# Patient Record
Sex: FEMALE | Race: WHITE | Employment: UNEMPLOYED | RURAL
[De-identification: names, ages, dates, MRNs, and addresses within clinical notes are randomized per-mention and may not be internally consistent; named-entity substitution may affect disease eponyms.]

---

## 2018-10-29 ENCOUNTER — OFFICE VISIT (OUTPATIENT)
Dept: FAMILY MEDICINE CLINIC | Age: 58
End: 2018-10-29

## 2018-10-29 VITALS
SYSTOLIC BLOOD PRESSURE: 140 MMHG | HEIGHT: 63 IN | HEART RATE: 77 BPM | OXYGEN SATURATION: 98 % | DIASTOLIC BLOOD PRESSURE: 82 MMHG | BODY MASS INDEX: 30.83 KG/M2 | RESPIRATION RATE: 18 BRPM | TEMPERATURE: 97.7 F | WEIGHT: 174 LBS

## 2018-10-29 DIAGNOSIS — Z11.59 ENCOUNTER FOR HEPATITIS C SCREENING TEST FOR LOW RISK PATIENT: ICD-10-CM

## 2018-10-29 DIAGNOSIS — E11.9 TYPE 2 DIABETES MELLITUS WITHOUT COMPLICATION, WITHOUT LONG-TERM CURRENT USE OF INSULIN (HCC): ICD-10-CM

## 2018-10-29 DIAGNOSIS — Z23 ENCOUNTER FOR IMMUNIZATION: ICD-10-CM

## 2018-10-29 DIAGNOSIS — E03.9 ACQUIRED HYPOTHYROIDISM: Primary | ICD-10-CM

## 2018-10-29 DIAGNOSIS — Z12.11 SCREENING FOR COLON CANCER: ICD-10-CM

## 2018-10-29 RX ORDER — LEVOTHYROXINE SODIUM 100 UG/1
100 TABLET ORAL
COMMUNITY
End: 2018-12-04 | Stop reason: SDUPTHER

## 2018-10-29 RX ORDER — PROPRANOLOL HYDROCHLORIDE 40 MG/1
40 TABLET ORAL
COMMUNITY
End: 2020-01-09 | Stop reason: SDUPTHER

## 2018-10-29 RX ORDER — GLUCOSAMINE SULFATE 1500 MG
1000 POWDER IN PACKET (EA) ORAL DAILY
COMMUNITY

## 2018-10-29 RX ORDER — LANOLIN ALCOHOL/MO/W.PET/CERES
1 CREAM (GRAM) TOPICAL 2 TIMES DAILY
COMMUNITY

## 2018-10-29 RX ORDER — HYDROCHLOROTHIAZIDE 25 MG/1
12.5 TABLET ORAL DAILY
COMMUNITY
End: 2018-11-01

## 2018-10-29 RX ORDER — ESCITALOPRAM OXALATE 20 MG/1
20 TABLET ORAL DAILY
COMMUNITY
End: 2019-03-11 | Stop reason: DRUGHIGH

## 2018-10-29 RX ORDER — PERPHENAZINE/AMITRIPTYLINE HCL 4MG-10MG
3 TABLET ORAL DAILY
COMMUNITY
End: 2019-06-14

## 2018-10-29 RX ORDER — MULTIVITAMIN
1000 TABLET ORAL DAILY
COMMUNITY
End: 2019-06-14

## 2018-10-29 RX ORDER — LEVOTHYROXINE SODIUM 125 UG/1
125 TABLET ORAL
COMMUNITY
End: 2018-10-29

## 2018-10-29 NOTE — PATIENT INSTRUCTIONS
A Healthy Lifestyle: Care Instructions  Your Care Instructions    A healthy lifestyle can help you feel good, stay at a healthy weight, and have plenty of energy for both work and play. A healthy lifestyle is something you can share with your whole family. A healthy lifestyle also can lower your risk for serious health problems, such as high blood pressure, heart disease, and diabetes. You can follow a few steps listed below to improve your health and the health of your family. Follow-up care is a key part of your treatment and safety. Be sure to make and go to all appointments, and call your doctor if you are having problems. It's also a good idea to know your test results and keep a list of the medicines you take. How can you care for yourself at home? · Do not eat too much sugar, fat, or fast foods. You can still have dessert and treats now and then. The goal is moderation. · Start small to improve your eating habits. Pay attention to portion sizes, drink less juice and soda pop, and eat more fruits and vegetables. ? Eat a healthy amount of food. A 3-ounce serving of meat, for example, is about the size of a deck of cards. Fill the rest of your plate with vegetables and whole grains. ? Limit the amount of soda and sports drinks you have every day. Drink more water when you are thirsty. ? Eat at least 5 servings of fruits and vegetables every day. It may seem like a lot, but it is not hard to reach this goal. A serving or helping is 1 piece of fruit, 1 cup of vegetables, or 2 cups of leafy, raw vegetables. Have an apple or some carrot sticks as an afternoon snack instead of a candy bar. Try to have fruits and/or vegetables at every meal.  · Make exercise part of your daily routine. You may want to start with simple activities, such as walking, bicycling, or slow swimming. Try to be active 30 to 60 minutes every day. You do not need to do all 30 to 60 minutes all at once.  For example, you can exercise 3 times a day for 10 or 20 minutes. Moderate exercise is safe for most people, but it is always a good idea to talk to your doctor before starting an exercise program.  · Keep moving. Jenn Seller the lawn, work in the garden, or Doctor [employer] Work. Take the stairs instead of the elevator at work. · If you smoke, quit. People who smoke have an increased risk for heart attack, stroke, cancer, and other lung illnesses. Quitting is hard, but there are ways to boost your chance of quitting tobacco for good. ? Use nicotine gum, patches, or lozenges. ? Ask your doctor about stop-smoking programs and medicines. ? Keep trying. In addition to reducing your risk of diseases in the future, you will notice some benefits soon after you stop using tobacco. If you have shortness of breath or asthma symptoms, they will likely get better within a few weeks after you quit. · Limit how much alcohol you drink. Moderate amounts of alcohol (up to 2 drinks a day for men, 1 drink a day for women) are okay. But drinking too much can lead to liver problems, high blood pressure, and other health problems. Family health  If you have a family, there are many things you can do together to improve your health. · Eat meals together as a family as often as possible. · Eat healthy foods. This includes fruits, vegetables, lean meats and dairy, and whole grains. · Include your family in your fitness plan. Most people think of activities such as jogging or tennis as the way to fitness, but there are many ways you and your family can be more active. Anything that makes you breathe hard and gets your heart pumping is exercise. Here are some tips:  ? Walk to do errands or to take your child to school or the bus.  ? Go for a family bike ride after dinner instead of watching TV. Where can you learn more? Go to http://erickson-caitlyn.info/. Enter W939 in the search box to learn more about \"A Healthy Lifestyle: Care Instructions. \"  Current as of: December 7, 2017  Content Version: 11.8  © 3332-2051 [employer]. Care instructions adapted under license by [employer] (which disclaims liability or warranty for this information). If you have questions about a medical condition or this instruction, always ask your healthcare professional. [address] any warranty or liability for your use of this information.

## 2018-10-29 NOTE — PROGRESS NOTES
Subjective:      Jennifer Gordon is a 59 y.o. female [facility]. with PMHx of hyperthyroidism, depression, migraine headache, fluid retention of the legs and hands here to establish care. Diabetes mellitus: diagnosed a few years ago. Currently not on medication and has been trying to control with diet. Does not monitor BG at home. No known family h/o diabetes mellitus. No h/o gestational diabetes. Reports that she has been in denial concerning her diagnosis. - Diet: low carbohydrate, high protein     Hypothyroidism: currently on levothyroxine 100 mcg daily which she reports compliance with. Currently endorses hair loss, dry skin, worsening depressive symptoms. Has not had thyroid function checked in some time. Migraine: currently on prophylactic therapy with propranolol and has not had one in some time since being on therapy. Associated with nausea, vomiting, light sensitivity. Health Maintenance:  · Cervical cancer screening: has had, but unknown year  · Mammogram: discussed and declines due to no known family h/o breast cancer  · Colonoscopy: has not had   · Hepatitis C screening (born between [birth year] and 1967): had not had   · Tetanus: had had, unknown when last received   · Pneumovax: has not had  · Shingles vaccine: none   · Influenza vaccine: discussed and declines       Current Outpatient Medications   Medication Sig Dispense Refill    propranolol (INDERAL) 40 mg tablet Take 40 mg by mouth nightly.  escitalopram oxalate (LEXAPRO) 20 mg tablet Take 20 mg by mouth daily.  hydroCHLOROthiazide (HYDRODIURIL) 25 mg tablet Take 12.5 mg by mouth daily.  cholecalciferol (VITAMIN D3) 1,000 unit cap Take 1,000 Units by mouth daily.  cinnamon bark (CINNAMON) 500 mg cap Take 1,000 mg by mouth daily.  glucosamine-chondroitin (ARTHX) 500-400 mg cap Take 1 Cap by mouth two (2) times a day.       B.infantis-B.ani-B.long-B.bifi (PROBIOTIC 4X) 10-15 mg TbEC Take 1 Tab by mouth two (2) times a day.  coconut oil 1,000 mg cap Take 3 Caps by mouth daily.  levothyroxine (SYNTHROID) 100 mcg tablet Take 100 mcg by mouth Daily (before breakfast). No Known Allergies      Past medical history - reviewed. Past Medical History:   Diagnosis Date    Depression     Diabetes ([facility] [address]     Fluid retention     Hypothyroidism     Migraine headache        Social history - reviewed. Social History     Tobacco Use    Smoking status: Never Smoker    Smokeless tobacco: Never Used   Substance Use Topics    Alcohol use: Yes     Frequency: Monthly or less     Drinks per session: 1 or 2        Family history - reviewed.    Family History   Problem Relation Age of Onset    No Known Problems Mother     Heart Failure Father        Review of Systems  Constitutional: negative for fevers and chills  Eyes: negative for visual disturbance and irritation  Ears, nose, mouth, throat, and face: negative for nasal congestion and sore throat  Respiratory: negative for cough, sputum or dyspnea on exertion  Cardiovascular: negative for chest pain, chest pressure/discomfort, palpitations, irregular heart beats, lower extremity edema  Gastrointestinal: negative for nausea, vomiting, change in bowel habits and abdominal pain  Genitourinary:negative for frequency, dysuria and hematuria  Musculoskeletal:negative for myalgias and arthralgias       Objective:     Visit Vitals  /82 (BP 1 Location: Right arm, BP Patient Position: Sitting) Comment: Manual   Pulse 77   Temp 97.7 °F (36.5 °C) (Oral) Comment: .   Resp 18   Ht 5' 3\" (1.6 m)   Wt 174 lb (78.9 kg)   SpO2 98%   BMI 30.82 kg/m²      General appearance - alert, well appearing, and in no distress  Eyes - pupils equal and reactive, extraocular eye movements intact, sclera anicteric  Oropharyngx - mucous membranes moist, pharynx normal without lesions  Neck - supple, no significant adenopathy, thyroid exam: thyroid is normal in size without nodules or tenderness  Chest - clear to auscultation, no wheezes, rales or rhonchi, symmetric air entry, no tachypnea, retractions or cyanosis  Heart - normal rate, regular rhythm, normal S1, S2, no murmurs, rubs, clicks or gallops  Abdomen - soft, nontender, nondistended, no masses or organomegaly, bowel sounds normal  Extremities - peripheral pulses normal, no pedal edema, no clubbing or cyanosis  Neurologic - alert, oriented, normal speech, no focal findings or movement disorder noted  Skin - normal coloration and turgor, no rashes, no suspicious skin lesions noted  Mental Status - alert, oriented to person, place, and time, normal mood, behavior, speech, dress, motor activity, and thought processes    Assessment/Plan:   Charlene Barr is a 59 y.o. female seen for:     1. Acquired hypothyroidism: with symptoms. Will check thyroid function studies as below and adjust medication as needed. - TSH 3RD GENERATION  - T4, FREE    2. Type 2 diabetes mellitus without complication, without long-term current use of insulin ([facility] [address]): per history, currently diet controlled. Check labs as below.   - HEMOGLOBIN A1C WITH EAG  - METABOLIC PANEL, COMPREHENSIVE  - LIPID PANEL    3. Screening for colon cancer: referral to GI provided. - REFERRAL TO GASTROENTEROLOGY    4. Encounter for immunization: Rx for Shingrix provided and encouraged to have administered at her local pharmacy. - varicella-zoster recombinant, PF, (SHINGRIX, PF,) 50 mcg/0.5 mL susr injection; 0.5mL by IntraMUSCular route once now and then repeat in 2-6 months  Dispense: 0.5 mL; Refill: 1    5. Encounter for hepatitis C screening test for low risk patient: screen based upon birth year. - HEPATITIS C AB      I have discussed the diagnosis with the patient and the intended plan as seen in the above orders. The patient has received an after-visit summary and questions were answered concerning future plans.  I have discussed medication side effects and warnings with the patient as well. Patient verbalizes understanding of plan of care and denies further questions or concerns at this time. Informed patient to return to the office if symptoms worsen or if new symptoms arise. Follow-up Disposition:  Return in about 4 months (around 2/28/2019), or sooner as needed.

## 2018-10-29 NOTE — PROGRESS NOTES
Identified pt with two pt identifiers(name and ). Chief Complaint   Patient presents with   [address] Immunization/Injection     would like shingles Vacc.  Labs     would like to have Thyroid checked. Health Maintenance Due   Topic    Hepatitis C Screening     DTaP/Tdap/Td series (1 - Tdap)    PAP AKA CERVICAL CYTOLOGY     Shingrix Vaccine Age 50> (1 of 2)    FOBT Q 1 YEAR AGE 50-75        Wt Readings from Last 3 Encounters:   10/29/18 174 lb (78.9 kg)     Temp Readings from Last 3 Encounters:   10/29/18 97.7 °F (36.5 °C) (Oral)     BP Readings from Last 3 Encounters:   10/29/18 140/82     Pulse Readings from Last 3 Encounters:   10/29/18 77         Learning Assessment:  :     No flowsheet data found. Depression Screening:  :     PHQ over the last two weeks 10/29/2018   PHQ Not Done Active Diagnosis of Depression or Bipolar Disorder       Fall Risk Assessment:  :     No flowsheet data found. Abuse Screening:  :     Abuse Screening Questionnaire 10/29/2018   Do you ever feel afraid of your partner? N   Are you in a relationship with someone who physically or mentally threatens you? N   Is it safe for you to go home? Y       Coordination of Care Questionnaire:  :     1) Have you been to an emergency room, urgent care clinic since your last visit? no   Hospitalized since your last visit? no             2) Have you seen or consulted any other health care providers outside of [facility] since your last visit? yes Dr. Ravinder Santana PCP [facility]. (Include any pap smears or colon screenings in this section.)    3) Do you have an Advance Directive on file? no  Are you interested in receiving information about Advance Directives? no    Reviewed record in preparation for visit and have obtained necessary documentation. Medication reconciliation up to date and corrected with patient at this time.

## 2018-11-01 ENCOUNTER — DOCUMENTATION ONLY (OUTPATIENT)
Dept: FAMILY MEDICINE CLINIC | Age: 58
End: 2018-11-01

## 2018-11-01 RX ORDER — DIAPER,BRIEF,INFANT-TODD,DISP
1 EACH MISCELLANEOUS 2 TIMES DAILY
COMMUNITY

## 2018-11-01 RX ORDER — CALCIUM CARB/VITAMIN D3/VIT K1 500-500-40
1 TABLET,CHEWABLE ORAL 2 TIMES DAILY
COMMUNITY
End: 2019-03-11 | Stop reason: ALTCHOICE

## 2018-11-01 RX ORDER — TRIAMTERENE/HYDROCHLOROTHIAZID 37.5-25 MG
0.5 TABLET ORAL DAILY
COMMUNITY
End: 2019-06-14 | Stop reason: SDUPTHER

## 2018-11-01 RX ORDER — DIAPER,BRIEF,ADULT, DISPOSABLE
1 EACH MISCELLANEOUS DAILY
COMMUNITY

## 2018-11-01 RX ORDER — LANOLIN ALCOHOL/MO/W.PET/CERES
800 CREAM (GRAM) TOPICAL DAILY
COMMUNITY
End: 2020-04-15

## 2018-11-03 LAB
ALBUMIN SERPL-MCNC: 4.4 G/DL (ref 3.6–4.8)
ALBUMIN/GLOB SERPL: 2 {RATIO} (ref 1.2–2.2)
ALP SERPL-CCNC: 110 IU/L (ref 39–117)
ALT SERPL-CCNC: 11 IU/L (ref 0–32)
AST SERPL-CCNC: 15 IU/L (ref 0–40)
BILIRUB SERPL-MCNC: 0.5 MG/DL (ref 0–1.2)
BUN SERPL-MCNC: 13 MG/DL (ref 8–27)
BUN/CREAT SERPL: 15 (ref 12–28)
CALCIUM SERPL-MCNC: 9.6 MG/DL (ref 8.7–10.3)
CHLORIDE SERPL-SCNC: 94 MMOL/L (ref 96–106)
CHOLEST SERPL-MCNC: 228 MG/DL (ref 100–199)
CO2 SERPL-SCNC: 27 MMOL/L (ref 20–29)
CREAT SERPL-MCNC: 0.87 MG/DL (ref 0.57–1)
EST. AVERAGE GLUCOSE BLD GHB EST-MCNC: 358 MG/DL
GLOBULIN SER CALC-MCNC: 2.2 G/DL (ref 1.5–4.5)
GLUCOSE SERPL-MCNC: 394 MG/DL (ref 65–99)
HBA1C MFR BLD: 14.1 % (ref 4.8–5.6)
HCV AB S/CO SERPL IA: <0.1 S/CO RATIO (ref 0–0.9)
HDLC SERPL-MCNC: 52 MG/DL
INTERPRETATION, 910389: NORMAL
LDLC SERPL CALC-MCNC: 133 MG/DL (ref 0–99)
Lab: NORMAL
POTASSIUM SERPL-SCNC: 5.1 MMOL/L (ref 3.5–5.2)
PROT SERPL-MCNC: 6.6 G/DL (ref 6–8.5)
SODIUM SERPL-SCNC: 136 MMOL/L (ref 134–144)
T4 FREE SERPL-MCNC: 2.14 NG/DL (ref 0.82–1.77)
TRIGL SERPL-MCNC: 217 MG/DL (ref 0–149)
TSH SERPL DL<=0.005 MIU/L-ACNC: 2.07 UIU/ML (ref 0.45–4.5)
VLDLC SERPL CALC-MCNC: 43 MG/DL (ref 5–40)

## 2018-11-07 ENCOUNTER — TELEPHONE (OUTPATIENT)
Dept: FAMILY MEDICINE CLINIC | Age: 58
End: 2018-11-07

## 2018-12-03 NOTE — TELEPHONE ENCOUNTER
----- Message from Karen Hansen sent at 12/3/2018 11:24 AM EST -----  Regarding: Dr. Paul Perez requesting lab results from [facility] November 1,2018, Pt also requesting a refill on her thyroid medication. [address] on file. Pt best contact number is [phone].

## 2018-12-05 ENCOUNTER — TELEPHONE (OUTPATIENT)
Dept: FAMILY MEDICINE CLINIC | Age: 58
End: 2018-12-05

## 2018-12-05 RX ORDER — LEVOTHYROXINE SODIUM 100 UG/1
100 TABLET ORAL
Qty: 90 TAB | Refills: 1 | Status: SHIPPED | OUTPATIENT
Start: 2018-12-05 | End: 2019-06-14 | Stop reason: SDUPTHER

## 2018-12-11 ENCOUNTER — OFFICE VISIT (OUTPATIENT)
Dept: FAMILY MEDICINE CLINIC | Age: 58
End: 2018-12-11

## 2018-12-11 VITALS
HEIGHT: 63 IN | TEMPERATURE: 98.6 F | SYSTOLIC BLOOD PRESSURE: 115 MMHG | WEIGHT: 170 LBS | RESPIRATION RATE: 18 BRPM | HEART RATE: 60 BPM | BODY MASS INDEX: 30.12 KG/M2 | OXYGEN SATURATION: 98 % | DIASTOLIC BLOOD PRESSURE: 70 MMHG

## 2018-12-11 DIAGNOSIS — E11.9 TYPE 2 DIABETES MELLITUS WITHOUT COMPLICATION, WITHOUT LONG-TERM CURRENT USE OF INSULIN (HCC): Primary | ICD-10-CM

## 2018-12-11 LAB
ALBUMIN UR QL STRIP: 30 MG/L
CREATININE, URINE POC: 200 MG/DL
MICROALBUMIN/CREAT RATIO POC: <30 MG/G

## 2018-12-11 RX ORDER — METFORMIN HYDROCHLORIDE 500 MG/1
TABLET, EXTENDED RELEASE ORAL
Qty: 60 TAB | Refills: 2 | Status: SHIPPED | OUTPATIENT
Start: 2018-12-11 | End: 2019-03-06 | Stop reason: SDUPTHER

## 2018-12-11 RX ORDER — GLIPIZIDE 10 MG/1
10 TABLET, FILM COATED, EXTENDED RELEASE ORAL DAILY
Qty: 30 TAB | Refills: 2 | Status: SHIPPED | OUTPATIENT
Start: 2018-12-11 | End: 2019-03-06 | Stop reason: SDUPTHER

## 2018-12-11 NOTE — PROGRESS NOTES
Identified pt with two pt identifiers(name and ). Chief Complaint   Patient presents with    Labs     go over lab results     Diabetes        Health Maintenance Due   Topic    DTaP/Tdap/Td series (1 - Tdap)    PAP AKA CERVICAL CYTOLOGY     Shingrix Vaccine Age 50> (1 of 2)    FOBT Q 1 YEAR AGE 50-75        Wt Readings from Last 3 Encounters:   18 170 lb (77.1 kg)   10/29/18 174 lb (78.9 kg)     Temp Readings from Last 3 Encounters:   18 98.6 °F (37 °C) (Oral)   10/29/18 97.7 °F (36.5 °C) (Oral)     BP Readings from Last 3 Encounters:   18 115/70   10/29/18 140/82     Pulse Readings from Last 3 Encounters:   18 60   10/29/18 77         Learning Assessment:  :     No flowsheet data found. Depression Screening:  :     PHQ over the last two weeks 10/29/2018   PHQ Not Done Active Diagnosis of Depression or Bipolar Disorder       Fall Risk Assessment:  :     No flowsheet data found. Abuse Screening:  :     Abuse Screening Questionnaire 10/29/2018   Do you ever feel afraid of your partner? N   Are you in a relationship with someone who physically or mentally threatens you? N   Is it safe for you to go home? Y       Coordination of Care Questionnaire:  :     1) Have you been to an emergency room, urgent care clinic since your last visit? no   Hospitalized since your last visit? no             2) Have you seen or consulted any other health care providers outside of [address] [place] [address] since your last visit? no  (Include any pap smears or colon screenings in this section.)    3) Do you have an Advance Directive on file? no  Are you interested in receiving information about Advance Directives? no    Reviewed record in preparation for visit and have obtained necessary documentation. Medication reconciliation up to date and corrected with patient at this time.

## 2018-12-11 NOTE — PROGRESS NOTES
Subjective:      Lydia Rodriguez is a 59 y.o. female here to discuss lab results. Diabetes mellitus: diagnosed a few years ago. Her most recent A1c 14.1%. Currently not on medication and has been trying to control with diet. Does not monitor BG at home. No known family h/o diabetes mellitus. No h/o gestational diabetes. - Diet: low carbohydrate, high protein     Current Outpatient Medications   Medication Sig Dispense Refill    OTHER Tumeric      levothyroxine (SYNTHROID) 100 mcg tablet Take 1 Tab by mouth Daily (before breakfast). 90 Tab 1    prenatal vit/iron fum/folic ac (PRENATAL-FOLIC ACID PO) Take 1 Tab by mouth daily.  chrom abiola/brindal berry (GARCINIA CAMBOGIA PO) Take 2,400 mg by mouth daily.  chromium picolinate 1,000 mcg tablet Take 1 Tab by mouth two (2) times a day.  triamterene-hydroCHLOROthiazide (MAXZIDE) 37.5-25 mg per tablet Take 0.5 Tabs by mouth daily.  ferrous fumarate/vit Bcomp,C (SUPER B COMPLEX PO) Take 1 Tab by mouth daily.  magnesium oxide (MAG-OX) 400 mg tablet Take 800 mg by mouth daily.  biotin 10,000 mcg cap Take 1 Cap by mouth two (2) times a day.  potassium 99 mg tablet Take 2 Tabs by mouth daily.  lysine (L-LYSINE) 500 mg tab tablet Take 1 Tab by mouth daily.  a lipoic acid/folic/mv-mn/lut (DIABETES HEALTH PO) Take  by mouth.  propranolol (INDERAL) 40 mg tablet Take 40 mg by mouth nightly.  escitalopram oxalate (LEXAPRO) 20 mg tablet Take 20 mg by mouth daily.  cholecalciferol (VITAMIN D3) 1,000 unit cap Take 1,000 Units by mouth daily.  cinnamon bark (CINNAMON) 500 mg cap Take 1,000 mg by mouth daily.  glucosamine-chondroitin (ARTHX) 500-400 mg cap Take 1 Cap by mouth two (2) times a day.  B.infantis-B.ani-B.long-B.bifi (PROBIOTIC 4X) 10-15 mg TbEC Take 1 Tab by mouth two (2) times a day.  coconut oil 1,000 mg cap Take 3 Caps by mouth daily.          No Known Allergies    Past Medical History: Diagnosis Date    Depression     Diabetes ([facility] [address]     Fluid retention     Hypothyroidism     Migraine headache        Social History     Tobacco Use    Smoking status: Never Smoker    Smokeless tobacco: Never Used   Substance Use Topics    Alcohol use: Yes     Frequency: Monthly or less     Drinks per session: 1 or 2        Review of Systems  Pertinent items are noted in HPI. Objective:     Visit Vitals  /70 (BP 1 Location: Right arm, BP Patient Position: Sitting) Comment: Manual   Pulse 60   Temp 98.6 °F (37 °C) (Oral) Comment: .   Resp 18   Ht 5' 3\" (1.6 m)   Wt 170 lb (77.1 kg)   SpO2 98%   BMI 30.11 kg/m²      General appearance - alert, well appearing, and in no distress  Chest - clear to auscultation, no wheezes, rales or rhonchi, symmetric air entry, no tachypnea, retractions or cyanosis  Heart - normal rate, regular rhythm, normal S1, S2, no murmurs, rubs, clicks or gallops    Assessment/Plan:   Pavithra Martin is a 59 y.o. female seen for:     1. Type 2 diabetes mellitus without complication, without long-term current use of insulin [facility]): will start therapy as below. Patient does not wish to do insulin therapy. Refer to diabetic education.   - AMB POC URINE, MICROALBUMIN, SEMIQUANT (3 RESULTS)  - metFORMIN ER (GLUCOPHAGE XR) 500 mg tablet; Take 1 tablet by mouth with dinner x 7 days then increase to 2 tablets daily by mouth with dinner. Dispense: 60 Tab; Refill: 2  - glipiZIDE SR (GLUCOTROL XL) 10 mg CR tablet; Take 1 Tab by mouth daily. Dispense: 30 Tab; Refill: 2  - REFERRAL TO DIABETIC EDUCATION    I have discussed the diagnosis with the patient and the intended plan as seen in the above orders. The patient has received an after-visit summary and questions were answered concerning future plans. I have discussed medication side effects and warnings with the patient as well. Patient verbalizes understanding of plan of care and denies further questions or concerns at this time.  Informed patient to return to the office if symptoms worsen or if new symptoms arise. Total face to face time 15 minutes, with > 50% spent counseling or coordination of care regarding diagnosis, risk and benefits of various treatment plans and expected outcomes. Follow-up Disposition:  Return in about 3 months (around 3/11/2019).

## 2018-12-11 NOTE — PATIENT INSTRUCTIONS
Learning About Type 2 Diabetes  What is type 2 diabetes? Insulin is a hormone that helps your body use sugar from your food as energy. Type 2 diabetes happens when your body can't use insulin the right way. Over time, the pancreas can't make enough insulin. If you don't have enough insulin, too much sugar stays in your blood. If you are overweight, get little or no exercise, or have type 2 diabetes in your family, you are more likely to have problems with the way insulin works in your body.  Americans, Hispanics, Native Americans,  Americans, and Pacific Islanders have a higher risk for type 2 diabetes. Type 2 diabetes can be prevented or delayed with a healthy lifestyle, which includes staying at a healthy weight, making smart food choices, and getting regular exercise. What can you expect with type 2 diabetes? Purnima Henderson keep hearing about how important it is to keep your blood sugar within a target range. That's because over time, high blood sugar can lead to serious problems. It can:  · Harm your eyes, nerves, and kidneys. · Damage your blood vessels, leading to heart disease and stroke. · Reduce blood flow and cause nerve damage to parts of your body, especially your feet. This can cause slow healing and pain when you walk. · Make your immune system weak and less able to fight infections. When people hear the word \"diabetes,\" they often think of problems like these. But daily care and treatment can help prevent or delay these problems. The goal is to keep your blood sugar in a target range. That's the best way to reduce your chance of having more problems from diabetes. What are the symptoms? Some people who have type 2 diabetes may not have any symptoms early on. Many people with the disease don't even know they have it at first. But with time, diabetes starts to cause symptoms. You experience most symptoms of type 2 diabetes when your blood sugar is either too high or too low.   The most common symptoms of high blood sugar include:  · Thirst.  · Frequent urination. · Weight loss. · Blurry vision. The symptoms of low blood sugar include:  · Sweating. · Shakiness. · Weakness. · Hunger. · Confusion. How can you prevent type 2 diabetes? The best way to prevent or delay type 2 diabetes is to adopt healthy habits, which include:  · Staying at a healthy weight. · Exercising regularly. · Eating healthy foods. How is type 2 diabetes treated? If you have type 2 diabetes, here are the most important things you can do. · Take your diabetes medicines. · Check your blood sugar as often as your doctor recommends. Also, get a hemoglobin A1c test at least every 6 months. · Try to eat a variety of foods and to spread carbohydrate throughout the day. Carbohydrate raises blood sugar higher and more quickly than any other nutrient does. Carbohydrate is found in sugar, breads and cereals, fruit, starchy vegetables such as potatoes and corn, and milk and yogurt. · Get at least 30 minutes of exercise on most days of the week. Walking is a good choice. You also may want to do other activities, such as running, swimming, cycling, or playing tennis or team sports. If your doctor says it's okay, do muscle-strengthening exercises at least 2 times a week. · See your doctor for checkups and tests on a regular schedule. · If you have high blood pressure or high cholesterol, take the medicines as prescribed by your doctor. · Do not smoke. Smoking can make health problems worse. This includes problems you might have with type 2 diabetes. If you need help quitting, talk to your doctor about stop-smoking programs and medicines. These can increase your chances of quitting for good. Follow-up care is a key part of your treatment and safety. Be sure to make and go to all appointments, and call your doctor if you are having problems.  It's also a good idea to know your test results and keep a list of the medicines you take. Where can you learn more? Go to http://erickson-caitlyn.info/. Enter C169 in the search box to learn more about \"Learning About Type 2 Diabetes. \"  Current as of: December 7, 2017  Content Version: 11.8  © 1200-0985 [employer]. Care instructions adapted under license by [employer] (which disclaims liability or warranty for this information). If you have questions about a medical condition or this instruction, always ask your healthcare professional. [address] any warranty or liability for your use of this information. Learning About High Cholesterol  What is high cholesterol? Cholesterol is a type of fat in your blood. It is needed for many body functions, such as making new cells. Cholesterol is made by your body. It also comes from food you eat. If you have too much cholesterol, it starts to build up in your arteries. This is called hardening of the arteries, or atherosclerosis. High cholesterol raises your risk of a heart attack and stroke. There are different types of cholesterol. LDL is the \"bad\" cholesterol. High LDL can raise your risk for heart disease, heart attack, and stroke. HDL is the \"good\" cholesterol. High HDL is linked with a lower risk for heart disease, heart attack, and stroke. Your cholesterol levels help your doctor find out your risk for having a heart attack or stroke. How can you prevent high cholesterol? A heart-healthy lifestyle can help you prevent high cholesterol. This lifestyle helps lower your risk for a heart attack and stroke. · Eat heart-healthy foods. ? Eat fruits, vegetables, whole grains (like oatmeal), dried beans and peas, nuts and seeds, soy products (like tofu), and fat-free or low-fat dairy products. ? Replace butter, margarine, and hydrogenated or partially hydrogenated oils with olive and canola oils. (Canola oil margarine without trans fat is fine.)  ?  Replace red meat with fish, poultry, and soy protein (like tofu). ? Limit processed and packaged foods like chips, crackers, and cookies. · Be active. Exercise can improve your cholesterol level. Get at least 30 minutes of exercise on most days of the week. Walking is a good choice. You also may want to do other activities, such as running, swimming, cycling, or playing tennis or team sports. · Stay at a healthy weight. Lose weight if you need to. · Don't smoke. If you need help quitting, talk to your doctor about stop-smoking programs and medicines. These can increase your chances of quitting for good. How is high cholesterol treated? The goal of treatment is to reduce your chances of having a heart attack or stroke. The goal is not to lower your cholesterol numbers only. · You may make lifestyle changes, such as eating healthy foods, not smoking, losing weight, and being more active. · You may have to take medicine. Follow-up care is a key part of your treatment and safety. Be sure to make and go to all appointments, and call your doctor if you are having problems. It's also a good idea to know your test results and keep a list of the medicines you take. Where can you learn more? Go to http://erickson-caitlyn.info/. Enter H263 in the search box to learn more about \"Learning About High Cholesterol. \"  Current as of: December 6, 2017  Content Version: 11.8  © 1314-3413 [employer]. Care instructions adapted under license by [employer] (which disclaims liability or warranty for this information). If you have questions about a medical condition or this instruction, always ask your healthcare professional. [address] any warranty or liability for your use of this information.

## 2019-03-06 DIAGNOSIS — E11.9 TYPE 2 DIABETES MELLITUS WITHOUT COMPLICATION, WITHOUT LONG-TERM CURRENT USE OF INSULIN (HCC): ICD-10-CM

## 2019-03-08 RX ORDER — GLIPIZIDE 10 MG/1
TABLET, FILM COATED, EXTENDED RELEASE ORAL
Qty: 30 TAB | Refills: 2 | Status: SHIPPED | OUTPATIENT
Start: 2019-03-08 | End: 2019-06-19 | Stop reason: SDUPTHER

## 2019-03-08 RX ORDER — METFORMIN HYDROCHLORIDE 500 MG/1
TABLET, EXTENDED RELEASE ORAL
Qty: 60 TAB | Refills: 2 | Status: SHIPPED | OUTPATIENT
Start: 2019-03-08 | End: 2019-06-14 | Stop reason: SDUPTHER

## 2019-03-11 ENCOUNTER — OFFICE VISIT (OUTPATIENT)
Dept: FAMILY MEDICINE CLINIC | Age: 59
End: 2019-03-11

## 2019-03-11 VITALS
TEMPERATURE: 98.5 F | WEIGHT: 172 LBS | OXYGEN SATURATION: 98 % | HEART RATE: 85 BPM | BODY MASS INDEX: 30.48 KG/M2 | HEIGHT: 63 IN | SYSTOLIC BLOOD PRESSURE: 136 MMHG | RESPIRATION RATE: 16 BRPM | DIASTOLIC BLOOD PRESSURE: 88 MMHG

## 2019-03-11 DIAGNOSIS — E11.9 TYPE 2 DIABETES MELLITUS WITHOUT COMPLICATION, WITHOUT LONG-TERM CURRENT USE OF INSULIN (HCC): Primary | ICD-10-CM

## 2019-03-11 DIAGNOSIS — F32.A DEPRESSION, UNSPECIFIED DEPRESSION TYPE: ICD-10-CM

## 2019-03-11 DIAGNOSIS — E03.9 ACQUIRED HYPOTHYROIDISM: ICD-10-CM

## 2019-03-11 RX ORDER — ESCITALOPRAM OXALATE 20 MG/1
30 TABLET ORAL DAILY
Qty: 45 TAB | Refills: 2 | Status: SHIPPED | OUTPATIENT
Start: 2019-03-11 | End: 2019-06-14 | Stop reason: SDUPTHER

## 2019-03-11 NOTE — PROGRESS NOTES
Subjective:      Nella Barron is a 62 y.o. female here diabetes follow up. She is fasting for labs. Diabetes mellitus:   · Medication compliance: compliant all of the time  · Diabetic diet compliance: compliant all of the time  · Home glucose monitoring: is performed regularly fasting in the AM, 14-day average , 30-day BG average  (30 days)   · Further diabetic ROS: no polyuria or polydipsia, no chest pain, dyspnea or TIA's, no numbness, tingling or pain in extremities, no unusual visual symptoms. · Eye exam: has not had     Other symptoms and concerns:   - She will be leaving for [place] for the next month to help her daughter. She has noticed over the past month or so and increase in her depression - sad mood, anhedonia. She reports history of PTSD and believes that she has not truly dealt with the underlying trauma she has experienced. She has been on Lexapro for a number of years and has tolerated well. She inquires about medication adjustment. Current Outpatient Medications   Medication Sig Dispense Refill    glipiZIDE SR (GLUCOTROL XL) 10 mg CR tablet TAKE ONE TABLET BY MOUTH DAILY 30 Tab 2    metFORMIN ER (GLUCOPHAGE XR) 500 mg tablet TAKE ONE TABLET BY MOUTH TWICE A DAY 60 Tab 2    VSL#3 112.5 billion cell cap Take 1 Cap by mouth two (2) times a day. 60 Cap 2    OTHER Tumeric      levothyroxine (SYNTHROID) 100 mcg tablet Take 1 Tab by mouth Daily (before breakfast). 90 Tab 1    prenatal vit/iron fum/folic ac (PRENATAL-FOLIC ACID PO) Take 1 Tab by mouth daily.  ferrous fumarate/vit Bcomp,C (SUPER B COMPLEX PO) Take 1 Tab by mouth daily.  magnesium oxide (MAG-OX) 400 mg tablet Take 800 mg by mouth daily.  biotin 10,000 mcg cap Take 1 Cap by mouth two (2) times a day.  potassium 99 mg tablet Take 2 Tabs by mouth daily.  lysine (L-LYSINE) 500 mg tab tablet Take 1 Tab by mouth daily.  propranolol (INDERAL) 40 mg tablet Take 40 mg by mouth nightly.       escitalopram oxalate (LEXAPRO) 20 mg tablet Take 20 mg by mouth daily.  cholecalciferol (VITAMIN D3) 1,000 unit cap Take 1,000 Units by mouth daily.  cinnamon bark (CINNAMON) 500 mg cap Take 1,000 mg by mouth daily.  glucosamine-chondroitin (ARTHX) 500-400 mg cap Take 1 Cap by mouth two (2) times a day.  coconut oil 1,000 mg cap Take 3 Caps by mouth daily.  triamterene-hydroCHLOROthiazide (MAXZIDE) 37.5-25 mg per tablet Take 0.5 Tabs by mouth daily. No Known Allergies      Past Medical History:   Diagnosis Date    Depression     Diabetes ([facility] [address]     Fluid retention     Hypothyroidism     Migraine headache        Social History     Tobacco Use    Smoking status: Never Smoker    Smokeless tobacco: Never Used   Substance Use Topics    Alcohol use: Yes     Frequency: Monthly or less     Drinks per session: 1 or 2        Review of Systems  Pertinent items are noted in HPI.      Objective:     Visit Vitals  /88 (BP 1 Location: Right arm, BP Patient Position: Sitting) Comment: Manual   Pulse 85   Temp 98.5 °F (36.9 °C) (Oral)   Resp 16   Ht 5' 3\" (1.6 m)   Wt 172 lb (78 kg)   SpO2 98%   BMI 30.47 kg/m²      General appearance - alert, well appearing, and in no distress  Eyes - pupils equal and reactive, extraocular eye movements intact, sclera anicteric  Oropharyngx - mucous membranes moist, pharynx normal without lesions  Neck - supple, no significant adenopathy, carotids upstroke normal bilaterally, no bruits  Chest - clear to auscultation, no wheezes, rales or rhonchi, symmetric air entry, no tachypnea, retractions or cyanosis  Heart - normal rate, regular rhythm, normal S1, S2, no murmurs, rubs, clicks or gallops  Neurological - alert, oriented, normal speech, no focal findings or movement disorder noted  Extremities - peripheral pulses normal, no pedal edema, no clubbing or cyanosis  Mental Status: alert, oriented to person, place, and time, normal mood, behavior, speech, dress, motor activity, and thought processes    Diabetic foot exam:   Left Foot:   Visual Exam: normal    Pulse DP: 2+ (normal)   Filament test: normal sensation       Right Foot:   Visual Exam: normal    Pulse DP: 2+ (normal)   Filament test: normal sensation       Assessment/Plan:   Kulwant Soto is a 62 y.o. female seen for:     1. Type 2 diabetes mellitus without complication, without long-term current use of insulin ([address]): home BG have improved. Continue with current therapy. Check labs. - HEMOGLOBIN A1C WITH EAG  - METABOLIC PANEL, COMPREHENSIVE  - LIPID PANEL  -  DIABETES FOOT EXAM  - Encouraged to scheduled eye exam     2. Acquired hypothyroidism: continue with current dose of levothyroxine. Check labs. - TSH AND FREE T4  - T3, FREE    3. Depression, unspecified depression type: will increase dose of Lexapro to 30 mg daily (would not go above this dose). Encouraged to restart outpatient behavioral therapy. - escitalopram oxalate (LEXAPRO) 20 mg tablet; Take 1.5 Tabs by mouth daily. Dispense: 45 Tab; Refill: 2    4. BMI 30.0-30.9,adult: I have reviewed/discussed the above normal BMI with the patient. I have recommended the following interventions: dietary management education, guidance, and counseling and encourage exercise. I have discussed the diagnosis with the patient and the intended plan as seen in the above orders. The patient has received an after-visit summary and questions were answered concerning future plans. I have discussed medication side effects and warnings with the patient as well. Patient verbalizes understanding of plan of care and denies further questions or concerns at this time. Informed patient to return to the office if symptoms worsen or if new symptoms arise. Follow-up Disposition:  Return in about 6 months (around 9/11/2019) for follow up or sooner as needed.

## 2019-03-11 NOTE — PATIENT INSTRUCTIONS
A Healthy Lifestyle: Care Instructions  Your Care Instructions    A healthy lifestyle can help you feel good, stay at a healthy weight, and have plenty of energy for both work and play. A healthy lifestyle is something you can share with your whole family. A healthy lifestyle also can lower your risk for serious health problems, such as high blood pressure, heart disease, and diabetes. You can follow a few steps listed below to improve your health and the health of your family. Follow-up care is a key part of your treatment and safety. Be sure to make and go to all appointments, and call your doctor if you are having problems. It's also a good idea to know your test results and keep a list of the medicines you take. How can you care for yourself at home? · Do not eat too much sugar, fat, or fast foods. You can still have dessert and treats now and then. The goal is moderation. · Start small to improve your eating habits. Pay attention to portion sizes, drink less juice and soda pop, and eat more fruits and vegetables. ? Eat a healthy amount of food. A 3-ounce serving of meat, for example, is about the size of a deck of cards. Fill the rest of your plate with vegetables and whole grains. ? Limit the amount of soda and sports drinks you have every day. Drink more water when you are thirsty. ? Eat at least 5 servings of fruits and vegetables every day. It may seem like a lot, but it is not hard to reach this goal. A serving or helping is 1 piece of fruit, 1 cup of vegetables, or 2 cups of leafy, raw vegetables. Have an apple or some carrot sticks as an afternoon snack instead of a candy bar. Try to have fruits and/or vegetables at every meal.  · Make exercise part of your daily routine. You may want to start with simple activities, such as walking, bicycling, or slow swimming. Try to be active 30 to 60 minutes every day. You do not need to do all 30 to 60 minutes all at once.  For example, you can exercise 3 times a day for 10 or 20 minutes. Moderate exercise is safe for most people, but it is always a good idea to talk to your doctor before starting an exercise program.  · Keep moving. Lauryndulce Feeler the lawn, work in the garden, or [place]. Take the stairs instead of the elevator at work. · If you smoke, quit. People who smoke have an increased risk for heart attack, stroke, cancer, and other lung illnesses. Quitting is hard, but there are ways to boost your chance of quitting tobacco for good. ? Use nicotine gum, patches, or lozenges. ? Ask your doctor about stop-smoking programs and medicines. ? Keep trying. In addition to reducing your risk of diseases in the future, you will notice some benefits soon after you stop using tobacco. If you have shortness of breath or asthma symptoms, they will likely get better within a few weeks after you quit. · Limit how much alcohol you drink. Moderate amounts of alcohol (up to 2 drinks a day for men, 1 drink a day for women) are okay. But drinking too much can lead to liver problems, high blood pressure, and other health problems. Family health  If you have a family, there are many things you can do together to improve your health. · Eat meals together as a family as often as possible. · Eat healthy foods. This includes fruits, vegetables, lean meats and dairy, and whole grains. · Include your family in your fitness plan. Most people think of activities such as jogging or tennis as the way to fitness, but there are many ways you and your family can be more active. Anything that makes you breathe hard and gets your heart pumping is exercise. Here are some tips:  ? Walk to do errands or to take your child to school or the bus.  ? Go for a family bike ride after dinner instead of watching TV. Where can you learn more? Go to http://erickson-caitlyn.info/. Enter Z599 in the search box to learn more about \"A Healthy Lifestyle: Care Instructions. \"  Current as of: September 11, 2018  Content Version: 11.9  © 9517-8290 [employer]. Care instructions adapted under license by [employer] (which disclaims liability or warranty for this information). If you have questions about a medical condition or this instruction, always ask your healthcare professional. [address] any warranty or liability for your use of this information.

## 2019-03-12 LAB
ALBUMIN SERPL-MCNC: 4.3 G/DL (ref 3.6–4.8)
ALBUMIN/GLOB SERPL: 1.9 {RATIO} (ref 1.2–2.2)
ALP SERPL-CCNC: 80 IU/L (ref 39–117)
ALT SERPL-CCNC: 16 IU/L (ref 0–32)
AST SERPL-CCNC: 18 IU/L (ref 0–40)
BILIRUB SERPL-MCNC: 0.3 MG/DL (ref 0–1.2)
BUN SERPL-MCNC: 10 MG/DL (ref 8–27)
BUN/CREAT SERPL: 14 (ref 12–28)
CALCIUM SERPL-MCNC: 10.3 MG/DL (ref 8.7–10.3)
CHLORIDE SERPL-SCNC: 100 MMOL/L (ref 96–106)
CHOLEST SERPL-MCNC: 185 MG/DL (ref 100–199)
CO2 SERPL-SCNC: 24 MMOL/L (ref 20–29)
CREAT SERPL-MCNC: 0.7 MG/DL (ref 0.57–1)
EST. AVERAGE GLUCOSE BLD GHB EST-MCNC: 151 MG/DL
GLOBULIN SER CALC-MCNC: 2.3 G/DL (ref 1.5–4.5)
GLUCOSE SERPL-MCNC: 150 MG/DL (ref 65–99)
HBA1C MFR BLD: 6.9 % (ref 4.8–5.6)
HDLC SERPL-MCNC: 44 MG/DL
INTERPRETATION, 910389: NORMAL
LDLC SERPL CALC-MCNC: 90 MG/DL (ref 0–99)
Lab: NORMAL
POTASSIUM SERPL-SCNC: 4.2 MMOL/L (ref 3.5–5.2)
PROT SERPL-MCNC: 6.6 G/DL (ref 6–8.5)
SODIUM SERPL-SCNC: 142 MMOL/L (ref 134–144)
T3FREE SERPL-MCNC: 3 PG/ML (ref 2–4.4)
T4 FREE SERPL-MCNC: 2.46 NG/DL (ref 0.82–1.77)
TRIGL SERPL-MCNC: 253 MG/DL (ref 0–149)
TSH SERPL DL<=0.005 MIU/L-ACNC: 0.33 UIU/ML (ref 0.45–4.5)
VLDLC SERPL CALC-MCNC: 51 MG/DL (ref 5–40)

## 2019-06-14 ENCOUNTER — OFFICE VISIT (OUTPATIENT)
Dept: FAMILY MEDICINE CLINIC | Age: 59
End: 2019-06-14

## 2019-06-14 VITALS
TEMPERATURE: 98.4 F | OXYGEN SATURATION: 98 % | BODY MASS INDEX: 31.18 KG/M2 | HEART RATE: 78 BPM | RESPIRATION RATE: 18 BRPM | DIASTOLIC BLOOD PRESSURE: 82 MMHG | WEIGHT: 176 LBS | SYSTOLIC BLOOD PRESSURE: 140 MMHG | HEIGHT: 63 IN

## 2019-06-14 DIAGNOSIS — F32.A DEPRESSION, UNSPECIFIED DEPRESSION TYPE: ICD-10-CM

## 2019-06-14 DIAGNOSIS — E11.9 TYPE 2 DIABETES MELLITUS WITHOUT COMPLICATION, WITHOUT LONG-TERM CURRENT USE OF INSULIN (HCC): Primary | ICD-10-CM

## 2019-06-14 DIAGNOSIS — E03.9 ACQUIRED HYPOTHYROIDISM: ICD-10-CM

## 2019-06-14 DIAGNOSIS — E11.9 TYPE 2 DIABETES MELLITUS WITHOUT COMPLICATION, WITHOUT LONG-TERM CURRENT USE OF INSULIN (HCC): ICD-10-CM

## 2019-06-14 DIAGNOSIS — R60.9 FLUID RETENTION: ICD-10-CM

## 2019-06-14 RX ORDER — GLIPIZIDE 5 MG/1
5 TABLET, FILM COATED, EXTENDED RELEASE ORAL DAILY
Qty: 90 TAB | Refills: 1 | Status: SHIPPED | OUTPATIENT
Start: 2019-06-14 | End: 2019-09-24 | Stop reason: DRUGHIGH

## 2019-06-14 RX ORDER — ESCITALOPRAM OXALATE 20 MG/1
30 TABLET ORAL DAILY
Qty: 135 TAB | Refills: 1 | Status: SHIPPED | OUTPATIENT
Start: 2019-06-14 | End: 2019-10-10 | Stop reason: SDUPTHER

## 2019-06-14 RX ORDER — LEVOTHYROXINE SODIUM 100 UG/1
100 TABLET ORAL
Qty: 90 TAB | Refills: 1 | Status: SHIPPED | OUTPATIENT
Start: 2019-06-14 | End: 2020-03-04

## 2019-06-14 RX ORDER — TRIAMTERENE/HYDROCHLOROTHIAZID 37.5-25 MG
0.5 TABLET ORAL DAILY
Qty: 45 TAB | Refills: 1 | Status: SHIPPED | OUTPATIENT
Start: 2019-06-14 | End: 2020-03-06 | Stop reason: SDUPTHER

## 2019-06-14 NOTE — PROGRESS NOTES
Identified pt with two pt identifiers(name and ). Chief Complaint   Patient presents with    Diabetes     Blood sugars are running from 150-219         Health Maintenance Due   Topic    Pneumococcal 0-64 years (1 of 1 - PPSV23)    EYE EXAM RETINAL OR DILATED     DTaP/Tdap/Td series (1 - Tdap)    PAP AKA CERVICAL CYTOLOGY     Shingrix Vaccine Age 50> (1 of 2)    FOBT Q 1 YEAR AGE 50-75        Wt Readings from Last 3 Encounters:   19 176 lb (79.8 kg)   19 172 lb (78 kg)   18 170 lb (77.1 kg)     Temp Readings from Last 3 Encounters:   19 98.4 °F (36.9 °C) (Oral)   19 98.5 °F (36.9 °C) (Oral)   18 98.6 °F (37 °C) (Oral)     BP Readings from Last 3 Encounters:   19 140/82   19 136/88   18 115/70     Pulse Readings from Last 3 Encounters:   19 78   19 85   18 60         Learning Assessment:  :     No flowsheet data found. Depression Screening:  :     3 most recent PHQ Screens 2019   PHQ Not Done -   Little interest or pleasure in doing things Not at all   Feeling down, depressed, irritable, or hopeless Not at all   Total Score PHQ 2 0       Fall Risk Assessment:  :     No flowsheet data found. Abuse Screening:  :     Abuse Screening Questionnaire 2019 10/29/2018   Do you ever feel afraid of your partner? N N   Are you in a relationship with someone who physically or mentally threatens you? N N   Is it safe for you to go home? Y Y       Coordination of Care Questionnaire:  :     1) Have you been to an emergency room, urgent care clinic since your last visit? no   Hospitalized since your last visit? no             2) Have you seen or consulted any other health care providers outside of [address] [place] [address] since your last visit? no  (Include any pap smears or colon screenings in this section.)    3) Do you have an Advance Directive on file? no  Are you interested in receiving information about Advance Directives? no    Reviewed record in preparation for visit and have obtained necessary documentation. Medication reconciliation up to date and corrected with patient at this time.

## 2019-06-14 NOTE — PATIENT INSTRUCTIONS
A Healthy Lifestyle: Care Instructions  Your Care Instructions    A healthy lifestyle can help you feel good, stay at a healthy weight, and have plenty of energy for both work and play. A healthy lifestyle is something you can share with your whole family. A healthy lifestyle also can lower your risk for serious health problems, such as high blood pressure, heart disease, and diabetes. You can follow a few steps listed below to improve your health and the health of your family. Follow-up care is a key part of your treatment and safety. Be sure to make and go to all appointments, and call your doctor if you are having problems. It's also a good idea to know your test results and keep a list of the medicines you take. How can you care for yourself at home? · Do not eat too much sugar, fat, or fast foods. You can still have dessert and treats now and then. The goal is moderation. · Start small to improve your eating habits. Pay attention to portion sizes, drink less juice and soda pop, and eat more fruits and vegetables. ? Eat a healthy amount of food. A 3-ounce serving of meat, for example, is about the size of a deck of cards. Fill the rest of your plate with vegetables and whole grains. ? Limit the amount of soda and sports drinks you have every day. Drink more water when you are thirsty. ? Eat at least 5 servings of fruits and vegetables every day. It may seem like a lot, but it is not hard to reach this goal. A serving or helping is 1 piece of fruit, 1 cup of vegetables, or 2 cups of leafy, raw vegetables. Have an apple or some carrot sticks as an afternoon snack instead of a candy bar. Try to have fruits and/or vegetables at every meal.  · Make exercise part of your daily routine. You may want to start with simple activities, such as walking, bicycling, or slow swimming. Try to be active 30 to 60 minutes every day. You do not need to do all 30 to 60 minutes all at once.  For example, you can exercise 3 times a day for 10 or 20 minutes. Moderate exercise is safe for most people, but it is always a good idea to talk to your doctor before starting an exercise program.  · Keep moving. Manav Goltz the lawn, work in the garden, or [facility]. Take the stairs instead of the elevator at work. · If you smoke, quit. People who smoke have an increased risk for heart attack, stroke, cancer, and other lung illnesses. Quitting is hard, but there are ways to boost your chance of quitting tobacco for good. ? Use nicotine gum, patches, or lozenges. ? Ask your doctor about stop-smoking programs and medicines. ? Keep trying. In addition to reducing your risk of diseases in the future, you will notice some benefits soon after you stop using tobacco. If you have shortness of breath or asthma symptoms, they will likely get better within a few weeks after you quit. · Limit how much alcohol you drink. Moderate amounts of alcohol (up to 2 drinks a day for men, 1 drink a day for women) are okay. But drinking too much can lead to liver problems, high blood pressure, and other health problems. Family health  If you have a family, there are many things you can do together to improve your health. · Eat meals together as a family as often as possible. · Eat healthy foods. This includes fruits, vegetables, lean meats and dairy, and whole grains. · Include your family in your fitness plan. Most people think of activities such as jogging or tennis as the way to fitness, but there are many ways you and your family can be more active. Anything that makes you breathe hard and gets your heart pumping is exercise. Here are some tips:  ? Walk to do errands or to take your child to school or the bus.  ? Go for a family bike ride after dinner instead of watching TV. Where can you learn more? Go to http://erickson-caitlyn.info/. Enter R778 in the search box to learn more about \"A Healthy Lifestyle: Care Instructions. \"  Current as of: September 11, 2018  Content Version: 11.9  © 1056-1602 [employer]. Care instructions adapted under license by [employer] (which disclaims liability or warranty for this information). If you have questions about a medical condition or this instruction, always ask your healthcare professional. [address] any warranty or liability for your use of this information.

## 2019-06-14 NOTE — PROGRESS NOTES
Subjective:      Cale Davies is a 62 y.o. female here diabetes follow up. She is fasting for labs. Diabetes mellitus:   · Medication compliance: compliant all of the time  · Diabetic diet compliance: compliant all of the time  · Home glucose monitoring: is performed regularly fasting in the AM, states average  and believes that this is her 30-day average   · Further diabetic ROS: no polyuria or polydipsia, no chest pain, dyspnea or TIA's, no numbness, tingling or pain in extremities, no unusual visual symptoms. · Eye exam: reports performed while in [place]    Hypothyroidism  Patient complains of hypothyroidism. Patient denies weight changes, heat / cold intolerance, change in energy level, palpitations. Compliant with levothyroxine therapy. Depression: reports that she has been doing well on Lexapro. Denies SI/HI. Current Outpatient Medications   Medication Sig Dispense Refill    escitalopram oxalate (LEXAPRO) 20 mg tablet Take 1.5 Tabs by mouth daily. 45 Tab 2    glipiZIDE SR (GLUCOTROL XL) 10 mg CR tablet TAKE ONE TABLET BY MOUTH DAILY 30 Tab 2    metFORMIN ER (GLUCOPHAGE XR) 500 mg tablet TAKE ONE TABLET BY MOUTH TWICE A DAY 60 Tab 2    VSL#3 112.5 billion cell cap Take 1 Cap by mouth two (2) times a day. 60 Cap 2    OTHER Tumeric      levothyroxine (SYNTHROID) 100 mcg tablet Take 1 Tab by mouth Daily (before breakfast). 90 Tab 1    prenatal vit/iron fum/folic ac (PRENATAL-FOLIC ACID PO) Take 1 Tab by mouth daily.  triamterene-hydroCHLOROthiazide (MAXZIDE) 37.5-25 mg per tablet Take 0.5 Tabs by mouth daily.  ferrous fumarate/vit Bcomp,C (SUPER B COMPLEX PO) Take 1 Tab by mouth daily.  magnesium oxide (MAG-OX) 400 mg tablet Take 800 mg by mouth daily.  biotin 10,000 mcg cap Take 1 Cap by mouth two (2) times a day.  potassium 99 mg tablet Take 2 Tabs by mouth daily.  lysine (L-LYSINE) 500 mg tab tablet Take 1 Tab by mouth daily.       propranolol (INDERAL) 40 mg tablet Take 40 mg by mouth nightly.  cholecalciferol (VITAMIN D3) 1,000 unit cap Take 1,000 Units by mouth daily.  glucosamine-chondroitin (ARTHX) 500-400 mg cap Take 1 Cap by mouth two (2) times a day. No Known Allergies      Past Medical History:   Diagnosis Date    Depression     Diabetes ([facility] [address]     Fluid retention     Hypothyroidism     Migraine headache        Social History     Tobacco Use    Smoking status: Never Smoker    Smokeless tobacco: Never Used   Substance Use Topics    Alcohol use: Yes     Frequency: Monthly or less     Drinks per session: 1 or 2        Review of Systems  Pertinent items are noted in HPI. Objective:     Visit Vitals  /82 (BP 1 Location: Right arm, BP Patient Position: Sitting) Comment: [place]   Pulse 78   Temp 98.4 °F (36.9 °C) (Oral) Comment: .   Resp 18   Ht 5' 3\" (1.6 m)   Wt 176 lb (79.8 kg)   SpO2 98%   BMI 31.18 kg/m²      General appearance - alert, well appearing, and in no distress  Eyes - pupils equal and reactive, extraocular eye movements intact, sclera anicteric  Oropharyngx - mucous membranes moist, pharynx normal without lesions  Neck - supple, no significant adenopathy, carotids upstroke normal bilaterally, no bruits  Chest - clear to auscultation, no wheezes, rales or rhonchi, symmetric air entry, no tachypnea, retractions or cyanosis  Heart - normal rate, regular rhythm, normal S1, S2, no murmurs, rubs, clicks or gallops  Neurological - alert, oriented, normal speech, no focal findings or movement disorder noted  Extremities - peripheral pulses normal, no pedal edema, no clubbing or cyanosis  Mental Status: alert, oriented to person, place, and time, normal mood, behavior, speech, dress, motor activity, and thought processes      Assessment/Plan:   Darlyn Frias is a 62 y.o. female seen for:     1.  Type 2 diabetes mellitus without complication, without long-term current use of insulin ([facility] [address]): based upon home readings, will titrate dose of glipizide to 15 mg daily. Continue with metformin.  - HEMOGLOBIN A1C WITH EAG  - METABOLIC PANEL, BASIC  - glipiZIDE SR (GLUCOTROL XL) 5 mg CR tablet; Take 1 Tab by mouth daily. Take 1 tablet with 10 mg for total of 15 mg daily. Dispense: 90 Tab; Refill: 1    2. Acquired hypothyroidism: stable, continue with current therapy. - levothyroxine (SYNTHROID) 100 mcg tablet; Take 1 Tab by mouth Daily (before breakfast). Dispense: 90 Tab; Refill: 1  - TSH AND FREE T4  - T3, FREE    3. Depression, unspecified depression type: stable, continue with current therapy. - escitalopram oxalate (LEXAPRO) 20 mg tablet; Take 1.5 Tabs by mouth daily. Dispense: 135 Tab; Refill: 1    4. Fluid retention  - triamterene-hydroCHLOROthiazide (MAXZIDE) 37.5-25 mg per tablet; Take 0.5 Tabs by mouth daily. Dispense: 45 Tab; Refill: 1      I have discussed the diagnosis with the patient and the intended plan as seen in the above orders. The patient has received an after-visit summary and questions were answered concerning future plans. I have discussed medication side effects and warnings with the patient as well. Patient verbalizes understanding of plan of care and denies further questions or concerns at this time. Informed patient to return to the office if symptoms worsen or if new symptoms arise. Follow-up and Dispositions    · Return in about 6 months (around 12/14/2019) for follow up or sooner as needed.

## 2019-06-15 LAB
BUN SERPL-MCNC: 12 MG/DL (ref 8–27)
BUN/CREAT SERPL: 16 (ref 12–28)
CALCIUM SERPL-MCNC: 9.6 MG/DL (ref 8.7–10.3)
CHLORIDE SERPL-SCNC: 101 MMOL/L (ref 96–106)
CO2 SERPL-SCNC: 26 MMOL/L (ref 20–29)
CREAT SERPL-MCNC: 0.75 MG/DL (ref 0.57–1)
EST. AVERAGE GLUCOSE BLD GHB EST-MCNC: 197 MG/DL
GLUCOSE SERPL-MCNC: 172 MG/DL (ref 65–99)
HBA1C MFR BLD: 8.5 % (ref 4.8–5.6)
POTASSIUM SERPL-SCNC: 4.8 MMOL/L (ref 3.5–5.2)
SODIUM SERPL-SCNC: 141 MMOL/L (ref 134–144)
T3FREE SERPL-MCNC: 2.9 PG/ML (ref 2–4.4)
T4 FREE SERPL-MCNC: 2.04 NG/DL (ref 0.82–1.77)
TSH SERPL DL<=0.005 MIU/L-ACNC: 1.27 UIU/ML (ref 0.45–4.5)

## 2019-06-18 RX ORDER — METFORMIN HYDROCHLORIDE 500 MG/1
TABLET, EXTENDED RELEASE ORAL
Qty: 60 TAB | Refills: 5 | Status: SHIPPED | OUTPATIENT
Start: 2019-06-18 | End: 2019-10-24 | Stop reason: DRUGHIGH

## 2019-06-19 DIAGNOSIS — E11.9 TYPE 2 DIABETES MELLITUS WITHOUT COMPLICATION, WITHOUT LONG-TERM CURRENT USE OF INSULIN (HCC): ICD-10-CM

## 2019-06-19 RX ORDER — GLIPIZIDE 10 MG/1
10 TABLET, FILM COATED, EXTENDED RELEASE ORAL DAILY
Qty: 90 TAB | Refills: 1 | Status: SHIPPED | OUTPATIENT
Start: 2019-06-19 | End: 2019-09-24 | Stop reason: DRUGHIGH

## 2019-09-16 ENCOUNTER — TELEPHONE (OUTPATIENT)
Dept: FAMILY MEDICINE CLINIC | Age: 59
End: 2019-09-16

## 2019-09-16 NOTE — TELEPHONE ENCOUNTER
Please get more information - what are her blood sugar readings? When is she checking? Any changes in her diet?

## 2019-09-16 NOTE — TELEPHONE ENCOUNTER
Pt called requesting to speak w/ nurse regarding medication glipizide.      Best contact: [phone]

## 2019-09-16 NOTE — TELEPHONE ENCOUNTER
Readings have jumped to average of 154. Patient is checking before breakfast first thing in the morning. Patient has not made any changes in diet.

## 2019-09-16 NOTE — TELEPHONE ENCOUNTER
Patient states that her body has grown resistant to Glipizide again, was wondering if she could increase dose to 20 mg and have a med refill because she will be out by Wed.

## 2019-09-19 NOTE — TELEPHONE ENCOUNTER
She should continue with meds are prescribed. She should make a visit for further review of blood sugars and medication titration.

## 2019-09-24 ENCOUNTER — OFFICE VISIT (OUTPATIENT)
Dept: FAMILY MEDICINE CLINIC | Age: 59
End: 2019-09-24

## 2019-09-24 VITALS
TEMPERATURE: 97.5 F | WEIGHT: 183 LBS | OXYGEN SATURATION: 96 % | BODY MASS INDEX: 32.43 KG/M2 | RESPIRATION RATE: 18 BRPM | SYSTOLIC BLOOD PRESSURE: 132 MMHG | HEIGHT: 63 IN | HEART RATE: 85 BPM | DIASTOLIC BLOOD PRESSURE: 78 MMHG

## 2019-09-24 DIAGNOSIS — E11.9 TYPE 2 DIABETES MELLITUS WITHOUT COMPLICATION, WITHOUT LONG-TERM CURRENT USE OF INSULIN (HCC): Primary | ICD-10-CM

## 2019-09-24 RX ORDER — GLIPIZIDE 10 MG/1
20 TABLET, FILM COATED, EXTENDED RELEASE ORAL DAILY
Qty: 180 TAB | Refills: 1
Start: 2019-09-24 | End: 2019-11-22 | Stop reason: SDUPTHER

## 2019-09-24 NOTE — PROGRESS NOTES
Subjective:     Laci Bee is a 62 y.o. female who presents for follow up of diabetes. Diabetes mellitus:   · Medication compliance: compliant all of the time,   · Diabetic diet compliance: compliant most of the time,   · Home glucose monitoring: is performed regularly, fasting BG have been averaging >200. BG over the past 6-8 weeks have been elevated. Admits to be under a lot of stress at home and with dealing with her 's health. · Further diabetic ROS: no polyuria or polydipsia, no chest pain, dyspnea or TIA's, no numbness, tingling or pain in extremities, no unusual visual symptoms, no hypoglycemia. Current Outpatient Medications   Medication Sig Dispense Refill    glipiZIDE SR (GLUCOTROL XL) 10 mg CR tablet Take 1 Tab by mouth daily. Take with 5 mg tablet for total of 15 mg daily. 90 Tab 1    metFORMIN ER (GLUCOPHAGE XR) 500 mg tablet TAKE ONE TABLET BY MOUTH TWICE A DAY 60 Tab 5    levothyroxine (SYNTHROID) 100 mcg tablet Take 1 Tab by mouth Daily (before breakfast). 90 Tab 1    triamterene-hydroCHLOROthiazide (MAXZIDE) 37.5-25 mg per tablet Take 0.5 Tabs by mouth daily. 45 Tab 1    escitalopram oxalate (LEXAPRO) 20 mg tablet Take 1.5 Tabs by mouth daily. 135 Tab 1    glipiZIDE SR (GLUCOTROL XL) 5 mg CR tablet Take 1 Tab by mouth daily. Take 1 tablet with 10 mg for total of 15 mg daily. 90 Tab 1    VSL#3 112.5 billion cell cap Take 1 Cap by mouth two (2) times a day. 60 Cap 2    OTHER Tumeric      prenatal vit/iron fum/folic ac (PRENATAL-FOLIC ACID PO) Take 1 Tab by mouth daily.  ferrous fumarate/vit Bcomp,C (SUPER B COMPLEX PO) Take 1 Tab by mouth daily.  magnesium oxide (MAG-OX) 400 mg tablet Take 800 mg by mouth daily.  biotin 10,000 mcg cap Take 1 Cap by mouth two (2) times a day.  potassium 99 mg tablet Take 2 Tabs by mouth daily.  lysine (L-LYSINE) 500 mg tab tablet Take 1 Tab by mouth daily.       propranolol (INDERAL) 40 mg tablet Take 40 mg by mouth nightly.  cholecalciferol (VITAMIN D3) 1,000 unit cap Take 1,000 Units by mouth daily.  glucosamine-chondroitin (ARTHX) 500-400 mg cap Take 1 Cap by mouth two (2) times a day. No Known Allergies      Past Medical History:   Diagnosis Date    Depression     Diabetes ([facility] [address]     Fluid retention     Hypothyroidism     Migraine headache        Social History     Tobacco Use    Smoking status: Never Smoker    Smokeless tobacco: Never Used   Substance Use Topics    Alcohol use: Yes     Frequency: Monthly or less     Drinks per session: 1 or 2        Lab Results   Component Value Date/Time    Hemoglobin A1c 8.5 (H) 06/14/2019 12:05 PM    Hemoglobin A1c 6.9 (H) 03/11/2019 11:49 AM    Hemoglobin A1c 14.1 (H) 11/01/2018 11:49 AM    Glucose 172 (H) 06/14/2019 12:05 PM    LDL, calculated 90 03/11/2019 11:49 AM    Creatinine 0.75 06/14/2019 12:05 PM         Review of Systems, additional:  Pertinent items are noted in HPI. Objective:     Visit Vitals  /78 (BP 1 Location: Right arm, BP Patient Position: Sitting) Comment: Manual   Pulse 85   Temp 97.5 °F (36.4 °C) (Oral) Comment: .   Resp 18   Ht 5' 3\" (1.6 m)   Wt 183 lb (83 kg)   SpO2 96%   BMI 32.42 kg/m²     General appearance - alert, well appearing, and in no distress  Mental status - alert, oriented to person, place, and time, normal mood, behavior, speech, dress, motor activity, and thought processes  Chest - clear to auscultation, no wheezes, rales or rhonchi, symmetric air entry, no tachypnea, retractions or cyanosis  Heart - normal rate, regular rhythm, normal S1, S2, no murmurs, rubs, clicks or gallops    Assessment/Plan:   Eugenio Puente is a 62 y.o. female seen today for:     1. Type 2 diabetes mellitus without complication, without long-term current use of insulin ([facility] [address]): increase glipizide to 20 mg daily, continue with metformin as prescribed. Check A1c.  Return in 1 month for follow up with BG log.   - glipiZIDE SR (GLUCOTROL XL) 10 mg CR tablet; Take 2 Tabs by mouth daily. Dispense: 180 Tab; Refill: 1  - HEMOGLOBIN A1C WITH EAG    I have discussed the diagnosis with the patient and the intended plan as seen in the above orders. The patient has received an after-visit summary and questions were answered concerning future plans. I have discussed medication side effects and warnings with the patient as well. Patient verbalizes understanding of plan of care and denies further questions or concerns at this time. Informed patient to return to the office if symptoms worsen or if new symptoms arise. Follow-up and Dispositions    · Return in about 4 weeks (around 10/22/2019) for diabetes follow up or sooner as needed.

## 2019-09-24 NOTE — PROGRESS NOTES
Identified pt with two pt identifiers(name and ). Chief Complaint   Patient presents with    Diabetes    Medication Evaluation     Would like to increase Glipizide        Health Maintenance Due   Topic    Pneumococcal 0-64 years (1 of 1 - PPSV23)    EYE EXAM RETINAL OR DILATED     DTaP/Tdap/Td series (1 - Tdap)    PAP AKA CERVICAL CYTOLOGY     Shingrix Vaccine Age 50> (1 of 2)    FOBT Q 1 YEAR AGE 50-75        Wt Readings from Last 3 Encounters:   19 183 lb (83 kg)   19 176 lb (79.8 kg)   19 172 lb (78 kg)     Temp Readings from Last 3 Encounters:   19 97.5 °F (36.4 °C) (Oral)   19 98.4 °F (36.9 °C) (Oral)   19 98.5 °F (36.9 °C) (Oral)     BP Readings from Last 3 Encounters:   19 132/78   19 140/82   19 136/88     Pulse Readings from Last 3 Encounters:   19 85   19 78   19 85         Learning Assessment:  :     No flowsheet data found. Depression Screening:  :     3 most recent PHQ Screens 2019   PHQ Not Done -   Little interest or pleasure in doing things Not at all   Feeling down, depressed, irritable, or hopeless Not at all   Total Score PHQ 2 0       Fall Risk Assessment:  :     No flowsheet data found. Abuse Screening:  :     Abuse Screening Questionnaire 2019 10/29/2018   Do you ever feel afraid of your partner? N N   Are you in a relationship with someone who physically or mentally threatens you? N N   Is it safe for you to go home? Y Y       Coordination of Care Questionnaire:  :     1) Have you been to an emergency room, urgent care clinic since your last visit? no   Hospitalized since your last visit? no             2) Have you seen or consulted any other health care providers outside of [address] [place] [address] since your last visit? no  (Include any pap smears or colon screenings in this section.)    3) Do you have an Advance Directive on file?  no  Are you interested in receiving information about Advance Directives? no    Reviewed record in preparation for visit and have obtained necessary documentation. Medication reconciliation up to date and corrected with patient at this time.

## 2019-10-10 DIAGNOSIS — E11.9 TYPE 2 DIABETES MELLITUS WITHOUT COMPLICATION, WITHOUT LONG-TERM CURRENT USE OF INSULIN (HCC): ICD-10-CM

## 2019-10-10 DIAGNOSIS — F32.A DEPRESSION, UNSPECIFIED DEPRESSION TYPE: ICD-10-CM

## 2019-10-23 RX ORDER — GLIPIZIDE 10 MG/1
TABLET, FILM COATED, EXTENDED RELEASE ORAL
Qty: 90 TAB | Refills: 1 | OUTPATIENT
Start: 2019-10-23

## 2019-10-23 RX ORDER — GLIPIZIDE 5 MG/1
TABLET, FILM COATED, EXTENDED RELEASE ORAL
Qty: 90 TAB | Refills: 1 | OUTPATIENT
Start: 2019-10-23

## 2019-10-23 RX ORDER — ESCITALOPRAM OXALATE 20 MG/1
TABLET ORAL
Qty: 135 TAB | Refills: 1 | Status: SHIPPED | OUTPATIENT
Start: 2019-10-23 | End: 2020-03-06 | Stop reason: SDUPTHER

## 2019-10-24 ENCOUNTER — OFFICE VISIT (OUTPATIENT)
Dept: FAMILY MEDICINE CLINIC | Age: 59
End: 2019-10-24

## 2019-10-24 VITALS
TEMPERATURE: 98.2 F | HEIGHT: 63 IN | DIASTOLIC BLOOD PRESSURE: 82 MMHG | WEIGHT: 178 LBS | RESPIRATION RATE: 18 BRPM | SYSTOLIC BLOOD PRESSURE: 122 MMHG | HEART RATE: 63 BPM | OXYGEN SATURATION: 98 % | BODY MASS INDEX: 31.54 KG/M2

## 2019-10-24 DIAGNOSIS — R05.9 COUGH: ICD-10-CM

## 2019-10-24 DIAGNOSIS — E11.9 TYPE 2 DIABETES MELLITUS WITHOUT COMPLICATION, WITHOUT LONG-TERM CURRENT USE OF INSULIN (HCC): Primary | ICD-10-CM

## 2019-10-24 LAB — HBA1C MFR BLD HPLC: 8.5 %

## 2019-10-24 RX ORDER — CLOTRIMAZOLE 10 MG/1
1 LOZENGE ORAL; TOPICAL DAILY
COMMUNITY
Start: 2019-10-17 | End: 2020-11-03 | Stop reason: ALTCHOICE

## 2019-10-24 RX ORDER — METFORMIN HYDROCHLORIDE 500 MG/1
1000 TABLET, EXTENDED RELEASE ORAL 2 TIMES DAILY WITH MEALS
Qty: 360 TAB | Refills: 1 | Status: SHIPPED | OUTPATIENT
Start: 2019-10-24 | End: 2020-05-04

## 2019-10-24 NOTE — PROGRESS NOTES
Subjective:     Thierno Almodovar is a 62 y.o. female who presents for follow up of diabetes. Diabetes mellitus:   · Medication compliance: compliant all of the time,   · Diabetic diet compliance: compliant most of the time,   · Home glucose monitoring: is performed regularly, BG values ranging 160s-190s. Patient did not bring glucose log to this visit. ,   · Further diabetic ROS: no polyuria or polydipsia, no chest pain, dyspnea or TIA's, no numbness, tingling or pain in extremities, no unusual visual symptoms, no hypoglycemia, no medication side effects noted. · Optometrist: [address] [facility] concerns: cough productive of green sputum for the past few days. States that she had other URI symptoms which have resolved with vitamin C and echinacea. Denies dyspnea. Current Outpatient Medications   Medication Sig Dispense Refill    clotrimazole (MYCELEX) 10 mg sky Take 1 Tab by mouth daily.  escitalopram oxalate (LEXAPRO) 20 mg tablet TAKE ONE AND ONE-HALF (1 & 1/2) TABLET BY MOUTH DAILY 135 Tab 1    glipiZIDE SR (GLUCOTROL XL) 10 mg CR tablet Take 2 Tabs by mouth daily. 180 Tab 1    metFORMIN ER (GLUCOPHAGE XR) 500 mg tablet TAKE ONE TABLET BY MOUTH TWICE A DAY 60 Tab 5    levothyroxine (SYNTHROID) 100 mcg tablet Take 1 Tab by mouth Daily (before breakfast). 90 Tab 1    triamterene-hydroCHLOROthiazide (MAXZIDE) 37.5-25 mg per tablet Take 0.5 Tabs by mouth daily. 45 Tab 1    VSL#3 112.5 billion cell cap Take 1 Cap by mouth two (2) times a day. 60 Cap 2    prenatal vit/iron fum/folic ac (PRENATAL-FOLIC ACID PO) Take 1 Tab by mouth daily.  ferrous fumarate/vit Bcomp,C (SUPER B COMPLEX PO) Take 1 Tab by mouth daily.  magnesium oxide (MAG-OX) 400 mg tablet Take 800 mg by mouth daily.  biotin 10,000 mcg cap Take 1 Cap by mouth two (2) times a day.  potassium 99 mg tablet Take 2 Tabs by mouth daily.  lysine (L-LYSINE) 500 mg tab tablet Take 1 Tab by mouth daily.  propranolol (INDERAL) 40 mg tablet Take 40 mg by mouth nightly.  cholecalciferol (VITAMIN D3) 1,000 unit cap Take 1,000 Units by mouth daily.  glucosamine-chondroitin (ARTHX) 500-400 mg cap Take 1 Cap by mouth two (2) times a day.  OTHER Tumeric         No Known Allergies      Past Medical History:   Diagnosis Date    Depression     Diabetes ([address]     Fluid retention     Hypothyroidism     Migraine headache        Social History     Tobacco Use    Smoking status: Never Smoker    Smokeless tobacco: Never Used   Substance Use Topics    Alcohol use: Yes     Frequency: Monthly or less     Drinks per session: 1 or 2        Lab Results   Component Value Date/Time    Hemoglobin A1c 8.5 (H) 06/14/2019 12:05 PM    Hemoglobin A1c 6.9 (H) 03/11/2019 11:49 AM    Hemoglobin A1c 14.1 (H) 11/01/2018 11:49 AM    Glucose 172 (H) 06/14/2019 12:05 PM    LDL, calculated 90 03/11/2019 11:49 AM    Creatinine 0.75 06/14/2019 12:05 PM         Review of Systems, additional:  Pertinent items are noted in HPI. Objective:     Visit Vitals  /82 (BP 1 Location: Right arm, BP Patient Position: Sitting) Comment: Manual   Pulse 63   Temp 98.2 °F (36.8 °C) (Oral) Comment: .   Resp 18   Ht 5' 3\" (1.6 m)   Wt 178 lb (80.7 kg)   SpO2 98%   BMI 31.53 kg/m²     General appearance - alert, well appearing, and in no distress  Chest - clear to auscultation, no wheezes, rales or rhonchi, symmetric air entry, no tachypnea, retractions or cyanosis  Heart - normal rate, regular rhythm, normal S1, S2, no murmurs, rubs, clicks or gallops      Recent Results (from the past 12 hour(s))   AMB POC HEMOGLOBIN A1C    Collection Time: 10/24/19  9:24 AM   Result Value Ref Range    Hemoglobin A1c (POC) 8.5 %       Assessment/Plan:   Nando Torres is a 62 y.o. female seen today for:     1.  Type 2 diabetes mellitus without complication, without long-term current use of insulin ([address]): not controlled with POC A1c of 8.5% (estimated average ). Will increase metformin to 1000 mg twice daily (total 2000 mg). Return in 3 months for follow up with A1c check or sooner as needed. - AMB POC HEMOGLOBIN A1C  - metFORMIN ER (GLUCOPHAGE XR) 500 mg tablet; Take 2 Tabs by mouth two (2) times daily (with meals). Dispense: 360 Tab; Refill: 1    2. BMI 31.0-31.9,adult: I have reviewed/discussed the above normal BMI with the patient. I have recommended the following interventions: dietary management education, guidance, and counseling and encourage exercise. 3. Cough: benign examination. Viral vs drainage. I have discussed the diagnosis with the patient and the intended plan as seen in the above orders. The patient has received an after-visit summary and questions were answered concerning future plans. I have discussed medication side effects and warnings with the patient as well. Patient verbalizes understanding of plan of care and denies further questions or concerns at this time. Informed patient to return to the office if symptoms worsen or if new symptoms arise. Follow-up and Dispositions    · Return in about 3 months (around 1/24/2020) for diabetes follow up or sooner as needed.

## 2019-10-24 NOTE — PATIENT INSTRUCTIONS
Cough: Care Instructions  Your Care Instructions    A cough is your body's response to something that bothers your throat or airways. Many things can cause a cough. You might cough because of a cold or the flu, bronchitis, or asthma. Smoking, postnasal drip, allergies, and stomach acid that backs up into your throat also can cause coughs. A cough is a symptom, not a disease. Most coughs stop when the cause, such as a cold, goes away. You can take a few steps at home to cough less and feel better. Follow-up care is a key part of your treatment and safety. Be sure to make and go to all appointments, and call your doctor if you are having problems. It's also a good idea to know your test results and keep a list of the medicines you take. How can you care for yourself at home? · Drink lots of water and other fluids. This helps thin the mucus and soothes a dry or sore throat. Honey or lemon juice in hot water or tea may ease a dry cough. · Take cough medicine as directed by your doctor. · Prop up your head on pillows to help you breathe and ease a dry cough. · Try cough drops to soothe a dry or sore throat. Cough drops don't stop a cough. Medicine-flavored cough drops are no better than candy-flavored drops or hard candy. · Do not smoke. Avoid secondhand smoke. If you need help quitting, talk to your doctor about stop-smoking programs and medicines. These can increase your chances of quitting for good. When should you call for help? Call 911 anytime you think you may need emergency care.  For example, call if:    · You have severe trouble breathing.    Call your doctor now or seek immediate medical care if:    · You cough up blood.     · You have new or worse trouble breathing.     · You have a new or higher fever.     · You have a new rash.    Watch closely for changes in your health, and be sure to contact your doctor if:    · You cough more deeply or more often, especially if you notice more mucus or a change in the color of your mucus.     · You have new symptoms, such as a sore throat, an earache, or sinus pain.     · You do not get better as expected. Where can you learn more? Go to http://erickson-caitlyn.info/. Enter D279 in the search box to learn more about \"Cough: Care Instructions. \"  Current as of: June 9, 2019  Content Version: 12.2  © 3301-8824 [employer]. Care instructions adapted under license by [employer] (which disclaims liability or warranty for this information). If you have questions about a medical condition or this instruction, always ask your healthcare professional. [address] any warranty or liability for your use of this information.

## 2019-10-24 NOTE — PROGRESS NOTES
Identified pt with two pt identifiers(name and ). Chief Complaint   Patient presents with    Diabetes     Patient did not bring glucose log. Health Maintenance Due   Topic    Pneumococcal 0-64 years (1 of 1 - PPSV23)    EYE EXAM RETINAL OR DILATED     DTaP/Tdap/Td series (1 - Tdap)    PAP AKA CERVICAL CYTOLOGY     Shingrix Vaccine Age 50> (1 of 2)    FOBT Q 1 YEAR AGE 50-75        Wt Readings from Last 3 Encounters:   10/24/19 178 lb (80.7 kg)   19 183 lb (83 kg)   19 176 lb (79.8 kg)     Temp Readings from Last 3 Encounters:   10/24/19 98.2 °F (36.8 °C) (Oral)   19 97.5 °F (36.4 °C) (Oral)   19 98.4 °F (36.9 °C) (Oral)     BP Readings from Last 3 Encounters:   10/24/19 122/82   19 132/78   19 140/82     Pulse Readings from Last 3 Encounters:   10/24/19 63   19 85   19 78         Learning Assessment:  :     Learning Assessment 10/24/2019   PRIMARY LEARNER Patient   PRIMARY LANGUAGE ENGLISH   LEARNER PREFERENCE PRIMARY DEMONSTRATION   ANSWERED BY self   RELATIONSHIP SELF       Depression Screening:  :     3 most recent PHQ Screens 2019   PHQ Not Done -   Little interest or pleasure in doing things Not at all   Feeling down, depressed, irritable, or hopeless Not at all   Total Score PHQ 2 0       Fall Risk Assessment:  :     No flowsheet data found. Abuse Screening:  :     Abuse Screening Questionnaire 2019 10/29/2018   Do you ever feel afraid of your partner? N N   Are you in a relationship with someone who physically or mentally threatens you? N N   Is it safe for you to go home?  Y Y       Coordination of Care Questionnaire:  :     1) Have you been to an emergency room, urgent care clinic since your last visit? no   Hospitalized since your last visit? no             2) Have you seen or consulted any other health care providers outside of [address] [place] [address] since your last visit? no  (Include any pap smears or colon screenings in this section.)    3) Do you have an Advance Directive on file? no  Are you interested in receiving information about Advance Directives? No    Reviewed record in preparation for visit and have obtained necessary documentation. Medication reconciliation up to date and corrected with patient at this time.

## 2019-11-18 ENCOUNTER — TELEPHONE (OUTPATIENT)
Dept: FAMILY MEDICINE CLINIC | Age: 59
End: 2019-11-18

## 2019-11-18 NOTE — TELEPHONE ENCOUNTER
----- Message from [email or web address] sent at 11/18/2019  2:33 PM EST -----  Regarding: Nitin Rivera MD/telphone  Contact: [phone]  General Message/Vendor Calls    Caller's first and last name: Maty Bowles [[id number]]      Reason for call: Letter Request      Callback required yes/no and why: Yes      Best contact number(s): [phone]      Details to clarify the request: Patient requesting letter, confirming she's not able to travel, to be made available for  from office by 11/18/2019. Please contact patient with any additional questions.        Alonso Siegel

## 2019-11-19 NOTE — TELEPHONE ENCOUNTER
Patient states that these are new symptoms occurring over the weekend and I informed her she would need an appointment to address this. She verbalized understanding and was transferred to .

## 2019-11-19 NOTE — TELEPHONE ENCOUNTER
----- Message from [employer] sent at 11/19/2019  1:40 PM EST -----  Regarding: Dr. Chiara Ojeda  Pt request for a call back from the practice. She missed previous call from the practice today. Best contact number is [phone].

## 2019-11-20 ENCOUNTER — OFFICE VISIT (OUTPATIENT)
Dept: FAMILY MEDICINE CLINIC | Age: 59
End: 2019-11-20

## 2019-11-20 VITALS
WEIGHT: 178 LBS | DIASTOLIC BLOOD PRESSURE: 80 MMHG | TEMPERATURE: 98.2 F | HEIGHT: 63 IN | BODY MASS INDEX: 31.54 KG/M2 | RESPIRATION RATE: 18 BRPM | OXYGEN SATURATION: 98 % | HEART RATE: 70 BPM | SYSTOLIC BLOOD PRESSURE: 138 MMHG

## 2019-11-20 DIAGNOSIS — K52.1 DIARRHEA DUE TO DRUG: Primary | ICD-10-CM

## 2019-11-20 DIAGNOSIS — E11.9 TYPE 2 DIABETES MELLITUS WITHOUT COMPLICATION, WITHOUT LONG-TERM CURRENT USE OF INSULIN (HCC): ICD-10-CM

## 2019-11-20 NOTE — LETTER
11/20/2019 8:51 AM 
 
Ms. Carissa Young [address] [place] [phone] To Whom It May Concern,  
 
Mrs. Carissa Young is currently under my care at [facility] since 2018. She is currently being treated for diabetes mellitus and is have an adverse reaction to her medication which has not yet been controlled. As we work to control her symptoms, I have advised that she not travel at this time. If there are any additional questions or concern, please have  Jorge Yuridia contact my office at [phone]. Sincerely, Kj Bruno MD

## 2019-11-20 NOTE — LETTER
11/20/2019 Ms. Milo Medrano [address] [phone] To Whom It May Concern,  
 
Mrs. Milo Medrano is currently under my care at [facility] and has been since 2018. She is currently being treated for diabetes mellitus and is having an adverse reaction to her medication which has not yet been controlled. As we work to control her symptoms, I have advised that she not travel at this time. If there are any additional questions or concern, please have Sadie Haylee Bennett contact my office at [phone]. Sincerely, Eulogio Bone MD

## 2019-11-20 NOTE — PROGRESS NOTES
Identified pt with two pt identifiers(name and ). Chief Complaint   Patient presents with    Diarrhea     had a sick feeling over the weekend but feels better now. Diarrhea is still continuing        Health Maintenance Due   Topic    Pneumococcal 0-64 years (1 of 1 - PPSV23)    EYE EXAM RETINAL OR DILATED     DTaP/Tdap/Td series (1 - Tdap)    PAP AKA CERVICAL CYTOLOGY     Shingrix Vaccine Age 50> (1 of 2)    BREAST CANCER SCRN MAMMOGRAM     FOBT Q 1 YEAR AGE 50-75     MICROALBUMIN Q1        Wt Readings from Last 3 Encounters:   19 178 lb (80.7 kg)   10/24/19 178 lb (80.7 kg)   19 183 lb (83 kg)     Temp Readings from Last 3 Encounters:   19 98.2 °F (36.8 °C) (Oral)   10/24/19 98.2 °F (36.8 °C) (Oral)   19 97.5 °F (36.4 °C) (Oral)     BP Readings from Last 3 Encounters:   19 138/80   10/24/19 122/82   19 132/78     Pulse Readings from Last 3 Encounters:   19 70   10/24/19 63   19 85         Learning Assessment:  :     Learning Assessment 10/24/2019   PRIMARY LEARNER Patient   PRIMARY LANGUAGE ENGLISH   LEARNER PREFERENCE PRIMARY DEMONSTRATION   ANSWERED BY self   RELATIONSHIP SELF       Depression Screening:  :     3 most recent PHQ Screens 2019   PHQ Not Done -   Little interest or pleasure in doing things Not at all   Feeling down, depressed, irritable, or hopeless Not at all   Total Score PHQ 2 0       Fall Risk Assessment:  :     No flowsheet data found. Abuse Screening:  :     Abuse Screening Questionnaire 2019 10/29/2018   Do you ever feel afraid of your partner? N N   Are you in a relationship with someone who physically or mentally threatens you? N N   Is it safe for you to go home?  Y Y       Coordination of Care Questionnaire:  :     1) Have you been to an emergency room, urgent care clinic since your last visit? no   Hospitalized since your last visit? no             2) Have you seen or consulted any other health care providers outside of [address] [place] [address] since your last visit? no  (Include any pap smears or colon screenings in this section.)    3) Do you have an Advance Directive on file? no  Are you interested in receiving information about Advance Directives? no    Reviewed record in preparation for visit and have obtained necessary documentation. Medication reconciliation up to date and corrected with patient at this time.

## 2019-11-20 NOTE — PATIENT INSTRUCTIONS

## 2019-11-20 NOTE — PROGRESS NOTES
Subjective:      Chris Ladd is a 62 y.o. female here with complaint of diarrhea which she had noticed since increasing dose of metformin about a month ago. Reports that \"its not as bad, but I have to be careful\". Denies associated fever, chills, nausea, vomiting. She is having at least 4 bowel movements daily. Denies melena, hematochezia. She does treat with Pepto Bismol, Imodium. No one at home having similar symptoms. Denies recent travel, recent antibiotic use. She is to travel to [place] on 19 but due to her current symptoms she does not feel well to travel. States that she has an episode over the weekend where she felt ill with sweating. She reports that she went to sit down and have hot and cold spells. No other symptoms and episodes lasted for about 24 hours before self-resolving. Diabetes: B this morning fasting; 14 day average 163; 30 day average 180. There has been improvement since increasing dose of metformin. Current Outpatient Medications   Medication Sig Dispense Refill    clotrimazole (MYCELEX) 10 mg sky Take 1 Tab by mouth daily.  metFORMIN ER (GLUCOPHAGE XR) 500 mg tablet Take 2 Tabs by mouth two (2) times daily (with meals). 360 Tab 1    escitalopram oxalate (LEXAPRO) 20 mg tablet TAKE ONE AND ONE-HALF (1 & 1/2) TABLET BY MOUTH DAILY 135 Tab 1    glipiZIDE SR (GLUCOTROL XL) 10 mg CR tablet Take 2 Tabs by mouth daily. 180 Tab 1    levothyroxine (SYNTHROID) 100 mcg tablet Take 1 Tab by mouth Daily (before breakfast). 90 Tab 1    triamterene-hydroCHLOROthiazide (MAXZIDE) 37.5-25 mg per tablet Take 0.5 Tabs by mouth daily. 45 Tab 1    OTHER Tumeric      prenatal vit/iron fum/folic ac (PRENATAL-FOLIC ACID PO) Take 1 Tab by mouth daily.  ferrous fumarate/vit Bcomp,C (SUPER B COMPLEX PO) Take 1 Tab by mouth daily.  magnesium oxide (MAG-OX) 400 mg tablet Take 800 mg by mouth daily.  biotin 10,000 mcg cap Take 1 Cap by mouth two (2) times a day.       potassium 99 mg tablet Take 2 Tabs by mouth daily.  lysine (L-LYSINE) 500 mg tab tablet Take 1 Tab by mouth daily.  propranolol (INDERAL) 40 mg tablet Take 40 mg by mouth nightly.  cholecalciferol (VITAMIN D3) 1,000 unit cap Take 1,000 Units by mouth daily.  glucosamine-chondroitin (ARTHX) 500-400 mg cap Take 1 Cap by mouth two (2) times a day.  VSL#3 112.5 billion cell cap Take 1 Cap by mouth two (2) times a day. 60 Cap 2       No Known Allergies    Past Medical History:   Diagnosis Date    Depression     Diabetes ([facility] [address]     Fluid retention     Hypothyroidism     Migraine headache        Social History     Tobacco Use    Smoking status: Never Smoker    Smokeless tobacco: Never Used   Substance Use Topics    Alcohol use: Yes     Frequency: Monthly or less     Drinks per session: 1 or 2        Review of Systems  Pertinent items are noted in HPI. Objective:     Visit Vitals  /80 (BP 1 Location: Right arm, BP Patient Position: Sitting) Comment: Manual   Pulse 70   Temp 98.2 °F (36.8 °C) (Oral) Comment: .   Resp 18   Ht 5' 3\" (1.6 m)   Wt 178 lb (80.7 kg)   SpO2 98%   BMI 31.53 kg/m²      General appearance - alert, well appearing, and in no distress  Chest - clear to auscultation, no wheezes, rales or rhonchi, symmetric air entry, no tachypnea, retractions or cyanosis  Heart - normal rate, regular rhythm, normal S1, S2, no murmurs, rubs, clicks or gallops  Abdomen - soft, nontender, nondistended, no masses or organomegaly, normal bowel sounds    Assessment/Plan:   Herbert Clark is a 62 y.o. female seen for:     1. Diarrhea due to drug: secondary to metformin use. No alarming history or examination findings. She has been taking OTC antidiarrheal therapy with modest improvement. Probiotic that she used to use is no longer manufactured per her report. Advised to take medication with meals to try and lessen GI effects. Travel letter provided.      2. Type 2 diabetes mellitus without complication, without long-term current use of insulin ([facility] [address]): improvement in BG averages. Continue with current therapy. Continue with home BG monitoring. Follow up as scheduled. I have discussed the diagnosis with the patient and the intended plan as seen in the above orders. The patient has received an after-visit summary and questions were answered concerning future plans. I have discussed medication side effects and warnings with the patient as well. Patient verbalizes understanding of plan of care and denies further questions or concerns at this time. Informed patient to return to the office if symptoms worsen or if new symptoms arise. Follow-up and Dispositions    · Return if symptoms worsen or fail to improve.

## 2019-11-22 DIAGNOSIS — E11.9 TYPE 2 DIABETES MELLITUS WITHOUT COMPLICATION, WITHOUT LONG-TERM CURRENT USE OF INSULIN (HCC): ICD-10-CM

## 2019-11-27 RX ORDER — GLIPIZIDE 10 MG/1
20 TABLET, FILM COATED, EXTENDED RELEASE ORAL DAILY
Qty: 180 TAB | Refills: 1 | Status: SHIPPED | OUTPATIENT
Start: 2019-11-27 | End: 2020-04-15

## 2019-12-14 DIAGNOSIS — E03.9 ACQUIRED HYPOTHYROIDISM: ICD-10-CM

## 2019-12-14 DIAGNOSIS — E11.9 TYPE 2 DIABETES MELLITUS WITHOUT COMPLICATION, WITHOUT LONG-TERM CURRENT USE OF INSULIN (HCC): ICD-10-CM

## 2019-12-14 DIAGNOSIS — R60.9 FLUID RETENTION: ICD-10-CM

## 2020-01-17 RX ORDER — PROPRANOLOL HYDROCHLORIDE 40 MG/1
40 TABLET ORAL
Qty: 90 TAB | Refills: 1 | Status: SHIPPED | OUTPATIENT
Start: 2020-01-17 | End: 2020-12-04

## 2020-01-24 ENCOUNTER — OFFICE VISIT (OUTPATIENT)
Dept: FAMILY MEDICINE CLINIC | Age: 60
End: 2020-01-24

## 2020-01-24 VITALS
WEIGHT: 180 LBS | HEIGHT: 63 IN | RESPIRATION RATE: 18 BRPM | HEART RATE: 98 BPM | DIASTOLIC BLOOD PRESSURE: 90 MMHG | OXYGEN SATURATION: 98 % | BODY MASS INDEX: 31.89 KG/M2 | TEMPERATURE: 97.8 F | SYSTOLIC BLOOD PRESSURE: 140 MMHG

## 2020-01-24 DIAGNOSIS — Z12.11 SCREENING FOR MALIGNANT NEOPLASM OF COLON: ICD-10-CM

## 2020-01-24 DIAGNOSIS — Z00.00 HEALTHCARE MAINTENANCE: ICD-10-CM

## 2020-01-24 DIAGNOSIS — Z23 ENCOUNTER FOR IMMUNIZATION: ICD-10-CM

## 2020-01-24 DIAGNOSIS — E11.9 TYPE 2 DIABETES MELLITUS WITHOUT COMPLICATION, WITHOUT LONG-TERM CURRENT USE OF INSULIN (HCC): Primary | ICD-10-CM

## 2020-01-24 LAB
ALBUMIN UR QL STRIP: 10 MG/L
CREATININE, URINE POC: 300 MG/DL
MICROALBUMIN/CREAT RATIO POC: <30 MG/G

## 2020-01-24 RX ORDER — LOPERAMIDE HCL 2 MG
2 TABLET ORAL
COMMUNITY
End: 2020-11-03

## 2020-01-24 NOTE — PATIENT INSTRUCTIONS
A Healthy Lifestyle: Care Instructions  Your Care Instructions    A healthy lifestyle can help you feel good, stay at a healthy weight, and have plenty of energy for both work and play. A healthy lifestyle is something you can share with your whole family. A healthy lifestyle also can lower your risk for serious health problems, such as high blood pressure, heart disease, and diabetes. You can follow a few steps listed below to improve your health and the health of your family. Follow-up care is a key part of your treatment and safety. Be sure to make and go to all appointments, and call your doctor if you are having problems. It's also a good idea to know your test results and keep a list of the medicines you take. How can you care for yourself at home? · Do not eat too much sugar, fat, or fast foods. You can still have dessert and treats now and then. The goal is moderation. · Start small to improve your eating habits. Pay attention to portion sizes, drink less juice and soda pop, and eat more fruits and vegetables. ? Eat a healthy amount of food. A 3-ounce serving of meat, for example, is about the size of a deck of cards. Fill the rest of your plate with vegetables and whole grains. ? Limit the amount of soda and sports drinks you have every day. Drink more water when you are thirsty. ? Eat at least 5 servings of fruits and vegetables every day. It may seem like a lot, but it is not hard to reach this goal. A serving or helping is 1 piece of fruit, 1 cup of vegetables, or 2 cups of leafy, raw vegetables. Have an apple or some carrot sticks as an afternoon snack instead of a candy bar. Try to have fruits and/or vegetables at every meal.  · Make exercise part of your daily routine. You may want to start with simple activities, such as walking, bicycling, or slow swimming. Try to be active 30 to 60 minutes every day. You do not need to do all 30 to 60 minutes all at once.  For example, you can exercise 3 times a day for 10 or 20 minutes. Moderate exercise is safe for most people, but it is always a good idea to talk to your doctor before starting an exercise program.  · Keep moving. Clement Rose the lawn, work in the garden, or [employer]. Take the stairs instead of the elevator at work. · If you smoke, quit. People who smoke have an increased risk for heart attack, stroke, cancer, and other lung illnesses. Quitting is hard, but there are ways to boost your chance of quitting tobacco for good. ? Use nicotine gum, patches, or lozenges. ? Ask your doctor about stop-smoking programs and medicines. ? Keep trying. In addition to reducing your risk of diseases in the future, you will notice some benefits soon after you stop using tobacco. If you have shortness of breath or asthma symptoms, they will likely get better within a few weeks after you quit. · Limit how much alcohol you drink. Moderate amounts of alcohol (up to 2 drinks a day for men, 1 drink a day for women) are okay. But drinking too much can lead to liver problems, high blood pressure, and other health problems. Family health  If you have a family, there are many things you can do together to improve your health. · Eat meals together as a family as often as possible. · Eat healthy foods. This includes fruits, vegetables, lean meats and dairy, and whole grains. · Include your family in your fitness plan. Most people think of activities such as jogging or tennis as the way to fitness, but there are many ways you and your family can be more active. Anything that makes you breathe hard and gets your heart pumping is exercise. Here are some tips:  ? Walk to do errands or to take your child to school or the bus.  ? Go for a family bike ride after dinner instead of watching TV. Where can you learn more? Go to http://erickson-caitlyn.info/. Enter Q569 in the search box to learn more about \"A Healthy Lifestyle: Care Instructions. \"  Current as of: May 28, 2019  Content Version: 12.2  © 0200-1913 [employer]. Care instructions adapted under license by [employer] (which disclaims liability or warranty for this information). If you have questions about a medical condition or this instruction, always ask your healthcare professional. [address] any warranty or liability for your use of this information.

## 2020-01-24 NOTE — PROGRESS NOTES
Identified pt with two pt identifiers(name and ). Chief Complaint   Patient presents with    Diabetes    Medication Evaluation        Health Maintenance Due   Topic    Pneumococcal 0-64 years (1 of 1 - PPSV23)    EYE EXAM RETINAL OR DILATED     DTaP/Tdap/Td series (1 - Tdap)    PAP AKA CERVICAL CYTOLOGY     Shingrix Vaccine Age 50> (1 of 2)    BREAST CANCER SCRN MAMMOGRAM     FOBT Q 1 YEAR AGE 50-75     MICROALBUMIN Q1        Wt Readings from Last 3 Encounters:   20 180 lb (81.6 kg)   19 178 lb (80.7 kg)   10/24/19 178 lb (80.7 kg)     Temp Readings from Last 3 Encounters:   20 97.8 °F (36.6 °C) (Oral)   19 98.2 °F (36.8 °C) (Oral)   10/24/19 98.2 °F (36.8 °C) (Oral)     BP Readings from Last 3 Encounters:   20 (!) 148/98   19 138/80   10/24/19 122/82     Pulse Readings from Last 3 Encounters:   20 98   19 70   10/24/19 63         Learning Assessment:  :     Learning Assessment 10/24/2019   PRIMARY LEARNER Patient   PRIMARY LANGUAGE ENGLISH   LEARNER PREFERENCE PRIMARY DEMONSTRATION   ANSWERED BY self   RELATIONSHIP SELF       Depression Screening:  :     3 most recent PHQ Screens 2020   PHQ Not Done -   Little interest or pleasure in doing things Not at all   Feeling down, depressed, irritable, or hopeless Not at all   Total Score PHQ 2 0       Fall Risk Assessment:  :     No flowsheet data found. Abuse Screening:  :     Abuse Screening Questionnaire 2020 2019 10/29/2018   Do you ever feel afraid of your partner? N N N   Are you in a relationship with someone who physically or mentally threatens you? N N N   Is it safe for you to go home?  Bella Konrad       Coordination of Care Questionnaire:  :     1) Have you been to an emergency room, urgent care clinic since your last visit? no   Hospitalized since your last visit? no             2) Have you seen or consulted any other health care providers outside of [address] [place] [address] since your last visit? no  (Include any pap smears or colon screenings in this section.)    3) Do you have an Advance Directive on file? no  Are you interested in receiving information about Advance Directives? no    Reviewed record in preparation for visit and have obtained necessary documentation. Medication reconciliation up to date and corrected with patient at this time.

## 2020-01-24 NOTE — PROGRESS NOTES
Subjective:     Antoinette Aguirre is a 62 y.o. female who presents for follow up of diabetes. Diabetes mellitus:   · Medication compliance: has been taking 1 tablet of glipizide (10 mg) for about last 2.5 months   · Diabetic diet compliance: compliant most of the time,   · Home glucose monitoring: is performed regularly, 14-day average 145, 30-day average 143  · Further diabetic ROS: no polyuria or polydipsia, no chest pain, dyspnea or TIA's, no numbness, no unusual visual symptoms, no medication side effects noted, has mild tingling in the toes  · Reports having hypoglycemia on 3 occasions for which she treated with small amount of juice       Health Maintenance:  · Cervical cancer screening: discussed, recommend return for screening  · Mammogram: discussed and declines   · Colonoscopy: discussed and prefers FIT testing. No personal h/o intestinal d/o, no known family h/o colon cancer  · Tetanus: >10 years ago   · Pneumovax: declines  · Influenza vaccine: declines       Current Outpatient Medications   Medication Sig Dispense Refill    loperamide (IMMODIUM) 2 mg tablet Take 2 mg by mouth four (4) times daily as needed for Diarrhea.  propranolol (INDERAL) 40 mg tablet Take 1 Tab by mouth nightly. 90 Tab 1    glipiZIDE SR (GLUCOTROL XL) 10 mg CR tablet Take 2 Tabs by mouth daily. (Patient taking differently: Take 10 mg by mouth daily. ) 180 Tab 1    clotrimazole (MYCELEX) 10 mg sky Take 1 Tab by mouth daily.  metFORMIN ER (GLUCOPHAGE XR) 500 mg tablet Take 2 Tabs by mouth two (2) times daily (with meals). 360 Tab 1    escitalopram oxalate (LEXAPRO) 20 mg tablet TAKE ONE AND ONE-HALF (1 & 1/2) TABLET BY MOUTH DAILY 135 Tab 1    levothyroxine (SYNTHROID) 100 mcg tablet Take 1 Tab by mouth Daily (before breakfast). 90 Tab 1    triamterene-hydroCHLOROthiazide (MAXZIDE) 37.5-25 mg per tablet Take 0.5 Tabs by mouth daily. 45 Tab 1    VSL#3 112.5 billion cell cap Take 1 Cap by mouth two (2) times a day.  61 Cap 2    OTHER Tumeric      prenatal vit/iron fum/folic ac (PRENATAL-FOLIC ACID PO) Take 1 Tab by mouth daily.  ferrous fumarate/vit Bcomp,C (SUPER B COMPLEX PO) Take 1 Tab by mouth daily.  magnesium oxide (MAG-OX) 400 mg tablet Take 800 mg by mouth daily.  biotin 10,000 mcg cap Take 1 Cap by mouth two (2) times a day.  potassium 99 mg tablet Take 2 Tabs by mouth daily.  lysine (L-LYSINE) 500 mg tab tablet Take 1 Tab by mouth daily.  cholecalciferol (VITAMIN D3) 1,000 unit cap Take 1,000 Units by mouth daily.  glucosamine-chondroitin (ARTHX) 500-400 mg cap Take 1 Cap by mouth two (2) times a day. No Known Allergies      Past Medical History:   Diagnosis Date    Depression     Diabetes ([facility] [address]     Fluid retention     Hypothyroidism     Migraine headache        Social History     Tobacco Use    Smoking status: Never Smoker    Smokeless tobacco: Never Used   Substance Use Topics    Alcohol use: Yes     Frequency: Monthly or less     Drinks per session: 1 or 2        Lab Results   Component Value Date/Time    Hemoglobin A1c 8.5 (H) 06/14/2019 12:05 PM    Hemoglobin A1c 6.9 (H) 03/11/2019 11:49 AM    Hemoglobin A1c 14.1 (H) 11/01/2018 11:49 AM    Glucose 172 (H) 06/14/2019 12:05 PM    LDL, calculated 90 03/11/2019 11:49 AM    Creatinine 0.75 06/14/2019 12:05 PM         Review of Systems, additional:  Pertinent items are noted in HPI.     Objective:     Vitals:    01/24/20 1012 01/24/20 1042   BP: (!) 148/98 140/90  Comment: manual   BP 1 Location: Right arm Right arm   BP Patient Position: Sitting Sitting   Pulse: 98    Resp: 18    Temp: 97.8 °F (36.6 °C)    TempSrc: Oral    SpO2: 98%    Weight: 180 lb (81.6 kg)    Height: 5' 3\" (1.6 m)        General appearance - alert, well appearing, and in no distress  Eyes - pupils equal and reactive, extraocular eye movements intact, sclera anicteric  Neck - supple, no significant adenopathy, carotids upstroke normal bilaterally, no bruits  Chest - clear to auscultation, no wheezes, rales or rhonchi, symmetric air entry, no tachypnea, retractions or cyanosis  Heart - normal rate, regular rhythm, normal S1, S2, no murmurs, rubs, clicks or gallops    Lab review: orders written for new lab studies as appropriate; see orders. Recent Results (from the past 12 hour(s))   AMB POC URINE, MICROALBUMIN, SEMIQUANT (3 RESULTS)    Collection Time: 01/24/20 10:18 AM   Result Value Ref Range    ALBUMIN, URINE POC 10 Negative mg/L    CREATININE, URINE  mg/dL    Microalbumin/creat ratio (POC) <30 <30 MG/G         Assessment/Plan:   Brenda Breen is a 62 y.o. female seen today for:     1. Type 2 diabetes mellitus without complication, without long-term current use of insulin ([facility] [address]): home readings are better than previous and expect for there to be improvement in her A1c. Check labs as below and continue with current therapy. - AMB POC URINE, MICROALBUMIN, SEMIQUANT (3 RESULTS)  - HEMOGLOBIN A1C WITH EAG  - METABOLIC PANEL, COMPREHENSIVE  - LIPID PANEL    2. Screening for malignant neoplasm of colon  - OCCULT BLOOD IMMUNOASSAY,DIAGNOSTIC    3. Encounter for immunization  - diph,Pertuss,Acell,,Tet Vac-PF (ADACEL) 2 Lf-(2.5-5-3-5 mcg)-5Lf/0.5 mL susp; 0.5 mL by IntraMUSCular route once for 1 dose. Dispense: 1 Syringe; Refill: 0    4. Healthcare maintenance: declines mammogram, Shingles and pneumococcal vaccinations. Encouraged to schedule well woman and cervical cancer screening exam.     I have discussed the diagnosis with the patient and the intended plan as seen in the above orders. The patient has received an after-visit summary and questions were answered concerning future plans. I have discussed medication side effects and warnings with the patient as well. Patient verbalizes understanding of plan of care and denies further questions or concerns at this time.  Informed patient to return to the office if symptoms worsen or if new symptoms arise.    Follow-up and Dispositions    · Return in about 4 months (around 5/24/2020) for follow up or sooner as needed.

## 2020-01-28 LAB
ALBUMIN SERPL-MCNC: 4 G/DL (ref 3.8–4.8)
ALBUMIN/GLOB SERPL: 1.7 {RATIO} (ref 1.2–2.2)
ALP SERPL-CCNC: 78 IU/L (ref 39–117)
ALT SERPL-CCNC: 32 IU/L (ref 0–32)
AST SERPL-CCNC: 27 IU/L (ref 0–40)
BILIRUB SERPL-MCNC: 0.4 MG/DL (ref 0–1.2)
BUN SERPL-MCNC: 10 MG/DL (ref 8–27)
BUN/CREAT SERPL: 11 (ref 12–28)
CALCIUM SERPL-MCNC: 9.6 MG/DL (ref 8.7–10.3)
CHLORIDE SERPL-SCNC: 101 MMOL/L (ref 96–106)
CHOLEST SERPL-MCNC: 181 MG/DL (ref 100–199)
CO2 SERPL-SCNC: 22 MMOL/L (ref 20–29)
CREAT SERPL-MCNC: 0.91 MG/DL (ref 0.57–1)
EST. AVERAGE GLUCOSE BLD GHB EST-MCNC: 160 MG/DL
GLOBULIN SER CALC-MCNC: 2.3 G/DL (ref 1.5–4.5)
GLUCOSE SERPL-MCNC: 253 MG/DL (ref 65–99)
HBA1C MFR BLD: 7.2 % (ref 4.8–5.6)
HDLC SERPL-MCNC: 52 MG/DL
HEMOCCULT STL QL IA: NORMAL
INTERPRETATION, 910389: NORMAL
LDLC SERPL CALC-MCNC: 84 MG/DL (ref 0–99)
Lab: NORMAL
POTASSIUM SERPL-SCNC: 4.8 MMOL/L (ref 3.5–5.2)
PROT SERPL-MCNC: 6.3 G/DL (ref 6–8.5)
REQUEST PROBLEM, 100875: NORMAL
SODIUM SERPL-SCNC: 143 MMOL/L (ref 134–144)
TRIGL SERPL-MCNC: 226 MG/DL (ref 0–149)
VLDLC SERPL CALC-MCNC: 45 MG/DL (ref 5–40)

## 2020-01-28 NOTE — PROGRESS NOTES
Diabetes improved with A1c 7.2%. Electrolytes, kidney function, liver function normal. Cholesterol levels stable. Letter sent.

## 2020-02-03 DIAGNOSIS — R60.9 FLUID RETENTION: ICD-10-CM

## 2020-02-03 DIAGNOSIS — E11.9 TYPE 2 DIABETES MELLITUS WITHOUT COMPLICATION, WITHOUT LONG-TERM CURRENT USE OF INSULIN (HCC): ICD-10-CM

## 2020-02-03 DIAGNOSIS — E03.9 ACQUIRED HYPOTHYROIDISM: ICD-10-CM

## 2020-02-06 LAB — HEMOCCULT STL QL IA: NEGATIVE

## 2020-02-25 DIAGNOSIS — E03.9 ACQUIRED HYPOTHYROIDISM: ICD-10-CM

## 2020-03-04 ENCOUNTER — TELEPHONE (OUTPATIENT)
Dept: FAMILY MEDICINE CLINIC | Age: 60
End: 2020-03-04

## 2020-03-04 RX ORDER — LEVOTHYROXINE SODIUM 100 UG/1
TABLET ORAL
Qty: 90 TAB | Refills: 1 | Status: SHIPPED | OUTPATIENT
Start: 2020-03-04 | End: 2020-09-16

## 2020-03-04 NOTE — TELEPHONE ENCOUNTER
----- Message from Cathleen Velasquez sent at 3/3/2020  4:25 PM EST -----  Regarding: Dr. Dela Cruz Render: [phone]  Patient is following up on a previous message sent in reference to her thyroid medicine. Patient has been out of her medicine, Levothyroxine for weeks. Patient states that [facility] (on file) hasn't received approval on this medication. Please follow up on this.

## 2020-03-06 DIAGNOSIS — R60.9 FLUID RETENTION: ICD-10-CM

## 2020-03-06 DIAGNOSIS — F32.A DEPRESSION, UNSPECIFIED DEPRESSION TYPE: ICD-10-CM

## 2020-03-06 RX ORDER — ESCITALOPRAM OXALATE 20 MG/1
TABLET ORAL
Qty: 135 TAB | Refills: 1 | Status: SHIPPED | OUTPATIENT
Start: 2020-03-06 | End: 2021-02-25

## 2020-03-06 RX ORDER — GLIPIZIDE 10 MG/1
20 TABLET, FILM COATED, EXTENDED RELEASE ORAL DAILY
Qty: 180 TAB | Refills: 1 | OUTPATIENT
Start: 2020-03-06

## 2020-03-06 RX ORDER — TRIAMTERENE/HYDROCHLOROTHIAZID 37.5-25 MG
0.5 TABLET ORAL DAILY
Qty: 45 TAB | Refills: 1 | OUTPATIENT
Start: 2020-03-06

## 2020-03-06 RX ORDER — LEVOTHYROXINE SODIUM 100 UG/1
100 TABLET ORAL
Qty: 90 TAB | Refills: 1 | OUTPATIENT
Start: 2020-03-06

## 2020-03-06 RX ORDER — GLIPIZIDE 5 MG/1
TABLET, FILM COATED, EXTENDED RELEASE ORAL
Qty: 90 TAB | Refills: 0 | OUTPATIENT
Start: 2020-03-06

## 2020-03-06 RX ORDER — TRIAMTERENE/HYDROCHLOROTHIAZID 37.5-25 MG
TABLET ORAL
Qty: 15 TAB | Refills: 0 | OUTPATIENT
Start: 2020-03-06

## 2020-03-06 RX ORDER — LEVOTHYROXINE SODIUM 100 UG/1
TABLET ORAL
Qty: 30 TAB | Refills: 0 | OUTPATIENT
Start: 2020-03-06

## 2020-03-06 RX ORDER — TRIAMTERENE/HYDROCHLOROTHIAZID 37.5-25 MG
0.5 TABLET ORAL DAILY
Qty: 45 TAB | Refills: 1 | Status: SHIPPED | OUTPATIENT
Start: 2020-03-06 | End: 2021-02-25

## 2020-04-15 ENCOUNTER — VIRTUAL VISIT (OUTPATIENT)
Dept: FAMILY MEDICINE CLINIC | Age: 60
End: 2020-04-15

## 2020-04-15 VITALS — BODY MASS INDEX: 28.88 KG/M2 | HEIGHT: 63 IN | WEIGHT: 163 LBS

## 2020-04-15 DIAGNOSIS — E11.9 TYPE 2 DIABETES MELLITUS WITHOUT COMPLICATION, WITHOUT LONG-TERM CURRENT USE OF INSULIN (HCC): Primary | ICD-10-CM

## 2020-04-15 RX ORDER — GLIPIZIDE 10 MG/1
10 TABLET, FILM COATED, EXTENDED RELEASE ORAL DAILY
Qty: 180 TAB | Refills: 1
Start: 2020-04-15 | End: 2020-09-16

## 2020-04-15 NOTE — PROGRESS NOTES
Identified pt with two pt identifiers(name and ). Chief Complaint   Patient presents with    Diabetes     hypoglysemia- at least 3 wks        Health Maintenance Due   Topic    Eye Exam Retinal or Dilated     DTaP/Tdap/Td series (1 - Tdap)    PAP AKA CERVICAL CYTOLOGY     Foot Exam Q1        Wt Readings from Last 3 Encounters:   20 180 lb (81.6 kg)   19 178 lb (80.7 kg)   10/24/19 178 lb (80.7 kg)     Temp Readings from Last 3 Encounters:   20 97.8 °F (36.6 °C) (Oral)   19 98.2 °F (36.8 °C) (Oral)   10/24/19 98.2 °F (36.8 °C) (Oral)     BP Readings from Last 3 Encounters:   20 140/90   19 138/80   10/24/19 122/82     Pulse Readings from Last 3 Encounters:   20 98   19 70   10/24/19 63         Learning Assessment:  :     Learning Assessment 10/24/2019   PRIMARY LEARNER Patient   PRIMARY LANGUAGE ENGLISH   LEARNER PREFERENCE PRIMARY DEMONSTRATION   ANSWERED BY self   RELATIONSHIP SELF       Depression Screening:  :     3 most recent PHQ Screens 2020   PHQ Not Done -   Little interest or pleasure in doing things Not at all   Feeling down, depressed, irritable, or hopeless Not at all   Total Score PHQ 2 0       Fall Risk Assessment:  :     No flowsheet data found. Abuse Screening:  :     Abuse Screening Questionnaire 2020 2019 10/29/2018   Do you ever feel afraid of your partner? N N N   Are you in a relationship with someone who physically or mentally threatens you? N N N   Is it safe for you to go home? Y Y Y       Coordination of Care Questionnaire:  :     1) Have you been to an emergency room, urgent care clinic since your last visit? no   Hospitalized since your last visit? no             2) Have you seen or consulted any other health care providers outside of [address] [place] [address] since your last visit? no  (Include any pap smears or colon screenings in this section.)    3) Do you have an Advance Directive on file?  yes  Are you interested in receiving information about Advance Directives? no        Reviewed record in preparation for visit and have obtained necessary documentation.

## 2020-04-15 NOTE — PROGRESS NOTES
Consent: Joana Griffin, who was seen by synchronous (real-time) audio-video technology, and/or her healthcare decision maker, is aware that this patient-initiated, Telehealth encounter on 4/15/2020 is a billable service, with coverage as determined by her insurance carrier. She is aware that she may receive a bill and has provided verbal consent to proceed: Yes. Assessment & Plan:   Diagnoses and all orders for this visit:    1. Type 2 diabetes mellitus without complication, without long-term current use of insulin ([facility] [address]): with reported hypoglycemia. Congratulated on weight loss and encouraged to continue lifestyle changes. Continue to monitor blood glucose. Continue with metformin as prescribed and will decrease glipizide to 10 mg daily.   -     glipiZIDE SR (GLUCOTROL XL) 10 mg CR tablet; Take 1 Tab by mouth daily. Subjective:   Joana Griffin is a 56 y.o. female who was seen for Diabetes (hypoglycemia- at least 3 wks)    \"I have been having a lot of hypoglycemic episodes. \" Occurring at least once a day for approximately the last month. Reports having BG values in the 60's which can occur anytime during the day. Reports that she feels like she is looking through water, diaphoretic, shaky. Will take a sip of juice and eat peanut butter. Denies BG values less than 60. Fasting  this morning which she reports is high for her. She has been compliant with metformin and glipizide which she is take 1 tablet twice daily. Has starting exercising and reports a 17 pound weight loss since her last visit. Exercising 6 days per week. She attributes weight loss to her exercising. Prior to Admission medications    Medication Sig Start Date End Date Taking? Authorizing Provider   escitalopram oxalate (LEXAPRO) 20 mg tablet TAKE ONE AND ONE-HALF (1 & 1/2) TABLET BY MOUTH DAILY 3/6/20  Yes Phillip Kendrick MD   triamterene-hydroCHLOROthiazide (MAXZIDE) 37.5-25 mg per tablet Take 0.5 Tabs by mouth daily.  3/6/20 Yes Josefina Contreras MD   levothyroxine (SYNTHROID) 100 mcg tablet TAKE 1 TABLET BY MOUTH ONCE DAILY BEFORE BREAKFAST 3/4/20  Yes Josefina Contreras MD   loperamide (IMMODIUM) 2 mg tablet Take 2 mg by mouth four (4) times daily as needed for Diarrhea. Yes Provider, Historical   propranolol (INDERAL) 40 mg tablet Take 1 Tab by mouth nightly. 1/17/20  Yes Josefina Contreras MD   glipiZIDE SR (GLUCOTROL XL) 10 mg CR tablet Take 2 Tabs by mouth daily. Patient taking differently: Take 10 mg by mouth daily. 11/27/19  Yes Josefina Contreras MD   clotrimazole [facility]) 10 mg sky Take 1 Tab by mouth daily. 10/17/19  Yes Provider, Historical   metFORMIN ER (GLUCOPHAGE XR) 500 mg tablet Take 2 Tabs by mouth two (2) times daily (with meals). 10/24/19  Yes Josefina Contreras MD   prenatal vit/iron fum/folic ac (PRENATAL-FOLIC ACID PO) Take 1 Tab by mouth daily. Yes Provider, Historical   ferrous fumarate/vit Bcomp,C (SUPER B COMPLEX PO) Take 1 Tab by mouth daily. Yes Provider, Historical   biotin 10,000 mcg cap Take 1 Cap by mouth two (2) times a day. Yes Provider, Historical   potassium 99 mg tablet Take 2 Tabs by mouth daily. Yes Provider, Historical   lysine (L-LYSINE) 500 mg tab tablet Take 1 Tab by mouth daily. Yes Provider, Historical   cholecalciferol (VITAMIN D3) 1,000 unit cap Take 1,000 Units by mouth daily. Yes Provider, Historical   glucosamine-chondroitin (ARTHX) 500-400 mg cap Take 1 Cap by mouth two (2) times a day. Yes Provider, Historical   VSL#3 112.5 billion cell cap Take 1 Cap by mouth two (2) times a day. 1/10/19 4/15/20  Josefina Contreras MD   OTHER Tumeric  4/15/20  Provider, Historical   magnesium oxide (MAG-OX) 400 mg tablet Take 800 mg by mouth daily.   4/15/20  Provider, Historical       No Known Allergies      Past Medical History:   Diagnosis Date    Depression     Diabetes ([facility] [address]     Fluid retention     Hypothyroidism     Migraine headache      Social History     Tobacco Use    Smoking status: Never Smoker    Smokeless tobacco: Never Used   Substance Use Topics    Alcohol use: Yes     Frequency: Monthly or less     Drinks per session: 1 or 2       ROS  Pertinent items noted in HPI    Objective:     Visit Vitals  Ht 5' 3\" (1.6 m)   Wt 163 lb (73.9 kg) Comment: Patient reported   BMI 28.87 kg/m²      General: alert, cooperative, no distress   Mental  status: normal mood, behavior, speech, dress, motor activity, and thought processes, able to follow commands   HENT: NCAT   Neck: no visualized mass   Resp: no respiratory distress   Neuro: no gross deficits   Skin: no discoloration or lesions of concern on visible areas   Psychiatric: normal affect, consistent with stated mood, no evidence of hallucinations       We discussed the expected course, resolution and complications of the diagnosis(es) in detail. Medication risks, benefits, costs, interactions, and alternatives were discussed as indicated. I advised her to contact the office if her condition worsens, changes or fails to improve as anticipated. She expressed understanding with the diagnosis(es) and plan. Sil Higuera is a 56 y.o. female being evaluated by a video visit encounter for concerns as above. A caregiver was present when appropriate. Due to this being a TeleHealth encounter (During [facility]-06 public health emergency), evaluation of the following organ systems was limited: Vitals/Constitutional/EENT/Resp/CV/GI//MS/Neuro/Skin/Heme-Lymph-Imm. Pursuant to the emergency declaration under the [facility] waiver authority and the [employer] General Act, this Virtual  Visit was conducted, with patient's (and/or legal guardian's) consent, to reduce the patient's risk of exposure to COVID-19 and provide necessary medical care.      Services were provided through a video synchronous discussion virtually to substitute for in-person clinic visit. Patient and provider were located at their individual homes.         Radha Guthrie MD

## 2020-05-04 ENCOUNTER — VIRTUAL VISIT (OUTPATIENT)
Dept: FAMILY MEDICINE CLINIC | Age: 60
End: 2020-05-04

## 2020-05-04 VITALS — BODY MASS INDEX: 28.87 KG/M2 | HEIGHT: 63 IN

## 2020-05-04 DIAGNOSIS — E11.9 TYPE 2 DIABETES MELLITUS WITHOUT COMPLICATION, WITHOUT LONG-TERM CURRENT USE OF INSULIN (HCC): ICD-10-CM

## 2020-05-04 RX ORDER — METFORMIN HYDROCHLORIDE 500 MG/1
500 TABLET, EXTENDED RELEASE ORAL 2 TIMES DAILY WITH MEALS
Qty: 360 TAB | Refills: 1
Start: 2020-05-04 | End: 2020-06-05

## 2020-05-04 NOTE — PROGRESS NOTES
Identified pt with two pt identifiers(name and ). Chief Complaint   Patient presents with    Diabetes     30 day average of 120 - has been adding exercise to her day, walked 4 miles yesterday - metformin is causing miserable stomach upset        Health Maintenance Due   Topic    Eye Exam Retinal or Dilated     DTaP/Tdap/Td series (1 - Tdap)    PAP AKA CERVICAL CYTOLOGY     Shingrix Vaccine Age 50> (1 of 2)    Foot Exam Q1        Wt Readings from Last 3 Encounters:   04/15/20 163 lb (73.9 kg)   20 180 lb (81.6 kg)   19 178 lb (80.7 kg)     Temp Readings from Last 3 Encounters:   20 97.8 °F (36.6 °C) (Oral)   19 98.2 °F (36.8 °C) (Oral)   10/24/19 98.2 °F (36.8 °C) (Oral)     BP Readings from Last 3 Encounters:   20 140/90   19 138/80   10/24/19 122/82     Pulse Readings from Last 3 Encounters:   20 98   19 70   10/24/19 63         Learning Assessment:  :     Learning Assessment 10/24/2019   PRIMARY LEARNER Patient   PRIMARY LANGUAGE ENGLISH   LEARNER PREFERENCE PRIMARY DEMONSTRATION   ANSWERED BY self   RELATIONSHIP SELF       Depression Screening:  :     3 most recent PHQ Screens 2020   PHQ Not Done -   Little interest or pleasure in doing things Not at all   Feeling down, depressed, irritable, or hopeless Not at all   Total Score PHQ 2 0       Fall Risk Assessment:  :     No flowsheet data found. Abuse Screening:  :     Abuse Screening Questionnaire 2020 2019 10/29/2018   Do you ever feel afraid of your partner? N N N   Are you in a relationship with someone who physically or mentally threatens you? N N N   Is it safe for you to go home?  Darrel Trujillo       Coordination of Care Questionnaire:  :     1) Have you been to an emergency room, urgent care clinic since your last visit? no   Hospitalized since your last visit? no             2) Have you seen or consulted any other health care providers outside of [address] [place] [address] since your last visit? no  (Include any pap smears or colon screenings in this section.)    3) Do you have an Advance Directive on file? Yes, on file with her   Are you interested in receiving information about Advance Directives? no    Reviewed record in preparation for visit and have obtained necessary documentation. Medication reconciliation up to date and corrected with patient at this time.

## 2020-05-04 NOTE — PROGRESS NOTES
Lise Cottrell is a 56 y.o. female who was seen by synchronous (real-time) audio-video technology on 2020. Consent: Lise Cottrell, who was seen by synchronous (real-time) audio-video technology, and/or her healthcare decision maker, is aware that this patient-initiated, Telehealth encounter on 2020 is a billable service, with coverage as determined by her insurance carrier. She is aware that she may receive a bill and has provided verbal consent to proceed: Yes. Assessment & Plan:   Diagnoses and all orders for this visit:    1. Type 2 diabetes mellitus without complication, without long-term current use of insulin ([facility] [address]): home BG values are controlled. Lifestyle modifications have been effective in glucose control. Trial of decreasing metformin to 500 twice daily, continue with glipizide as prescribed. -     metFORMIN ER (GLUCOPHAGE XR) 500 mg tablet; Take 1 Tab by mouth two (2) times daily (with meals). Subjective:   Lise Cottrell is a 56 y.o. female who was seen for Diabetes (30 day average of 120 - has been adding exercise to her day, walked 4 miles yesterday - metformin is causing miserable stomach upset)    Diabetes mellitus:   · Medication compliance: compliant all of the time,   · Diabetic diet compliance: compliant all of the time, continues to exercise  · Home glucose monitorin day average 120, 30 day average 120, tasting AM  today, 85 yesterday morning   · Further diabetic ROS: no polyuria or polydipsia, no chest pain, dyspnea or TIA's, no numbness, tingling or pain in extremities, no unusual visual symptoms, metformin causing abdominal upset and diarrhea. Denies further hypoglycemic episodes. Prior to Admission medications    Medication Sig Start Date End Date Taking? Authorizing Provider   glipiZIDE SR (GLUCOTROL XL) 10 mg CR tablet Take 1 Tab by mouth daily.  4/15/20  Yes Sara West MD   escitalopram oxalate (LEXAPRO) 20 mg tablet TAKE ONE AND ONE-HALF (1 & 1/2) TABLET BY MOUTH DAILY 3/6/20  Yes Bryce Sosa MD   triamterene-hydroCHLOROthiazide (MAXZIDE) 37.5-25 mg per tablet Take 0.5 Tabs by mouth daily. 3/6/20  Yes Bryce Sosa MD   levothyroxine (SYNTHROID) 100 mcg tablet TAKE 1 TABLET BY MOUTH ONCE DAILY BEFORE BREAKFAST 3/4/20  Yes Bryce Sosa MD   loperamide (IMMODIUM) 2 mg tablet Take 2 mg by mouth four (4) times daily as needed for Diarrhea. Yes Provider, Historical   propranolol (INDERAL) 40 mg tablet Take 1 Tab by mouth nightly. 1/17/20  Yes Bryce Sosa MD   clotrimazole [facility]) 10 mg sky Take 1 Tab by mouth daily. 10/17/19  Yes Provider, Historical   metFORMIN ER (GLUCOPHAGE XR) 500 mg tablet Take 2 Tabs by mouth two (2) times daily (with meals). 10/24/19  Yes Bryce Sosa MD   prenatal vit/iron fum/folic ac (PRENATAL-FOLIC ACID PO) Take 1 Tab by mouth daily. Yes Provider, Historical   ferrous fumarate/vit Bcomp,C (SUPER B COMPLEX PO) Take 1 Tab by mouth daily. Yes Provider, Historical   biotin 10,000 mcg cap Take 1 Cap by mouth two (2) times a day. Yes Provider, Historical   potassium 99 mg tablet Take 2 Tabs by mouth daily. Yes Provider, Historical   lysine (L-LYSINE) 500 mg tab tablet Take 1 Tab by mouth daily. Yes Provider, Historical   cholecalciferol (VITAMIN D3) 1,000 unit cap Take 1,000 Units by mouth daily. Yes Provider, Historical   glucosamine-chondroitin (ARTHX) 500-400 mg cap Take 1 Cap by mouth two (2) times a day.    Yes Provider, Historical     No Known Allergies    Past Medical History:   Diagnosis Date    Depression     [facility] ([facility] [address]     Fluid retention     Hypothyroidism     Migraine headache      Social History     Tobacco Use    Smoking status: Never Smoker    Smokeless tobacco: Never Used   Substance Use Topics    Alcohol use: Yes     Frequency: Monthly or less     Drinks per session: 1 or 2       ROS  Pertinent items noted in HPI    Objective:     Visit Vitals   5' 3\" (1.6 m) BMI 28.87 kg/m²      General: alert, cooperative, no distress   Mental  status: normal mood, behavior, speech, dress, motor activity, and thought processes, able to follow commands   HENT: NCAT   Neck: no visualized mass   Resp: no respiratory distress   Neuro: no gross deficits   Skin: no discoloration or lesions of concern on visible areas   Psychiatric: normal affect, consistent with stated mood, no evidence of hallucinations       We discussed the expected course, resolution and complications of the diagnosis(es) in detail. Medication risks, benefits, costs, interactions, and alternatives were discussed as indicated. I advised her to contact the office if her condition worsens, changes or fails to improve as anticipated. She expressed understanding with the diagnosis(es) and plan. Bronson Lanza is a 56 y.o. female who was evaluated by a video visit encounter for concerns as above. Patient identification was verified prior to start of the visit. A caregiver was present when appropriate. Due to this being a TeleHealth encounter (During [facility]- public health emergency), evaluation of the following organ systems was limited: Vitals/Constitutional/EENT/Resp/CV/GI//MS/Neuro/Skin/Heme-Lymph-Imm. Pursuant to the emergency declaration under the [facility]5 waiver authority and the [employer] Act, this Virtual  Visit was conducted, with patient's (and/or legal guardian's) consent, to reduce the patient's risk of exposure to COVID-19 and provide necessary medical care. Services were provided through a video synchronous discussion virtually to substitute for in-person clinic visit. Patient and provider were located at their individual homes.       Keena Vee MD

## 2020-06-05 DIAGNOSIS — E11.9 TYPE 2 DIABETES MELLITUS WITHOUT COMPLICATION, WITHOUT LONG-TERM CURRENT USE OF INSULIN (HCC): ICD-10-CM

## 2020-06-05 RX ORDER — METFORMIN HYDROCHLORIDE 500 MG/1
TABLET, EXTENDED RELEASE ORAL
Qty: 480 TAB | Refills: 0 | Status: SHIPPED | OUTPATIENT
Start: 2020-06-05 | End: 2020-07-02

## 2020-07-01 ENCOUNTER — TELEPHONE (OUTPATIENT)
Dept: FAMILY MEDICINE CLINIC | Age: 60
End: 2020-07-01

## 2020-07-01 DIAGNOSIS — E11.9 TYPE 2 DIABETES MELLITUS WITHOUT COMPLICATION, WITHOUT LONG-TERM CURRENT USE OF INSULIN (HCC): Primary | ICD-10-CM

## 2020-07-01 NOTE — TELEPHONE ENCOUNTER
----- Message from Aron Goyal sent at 7/1/2020 12:42 PM EDT -----  Regarding: Patrick/Rx  Pt stated there is a recall for Metformin and she is requesting a different medication. She state she did call last Thursday. Pts number is [phone] and [employer] number is [phone].

## 2020-07-02 NOTE — TELEPHONE ENCOUNTER
Spoke with patient. Metformin XR has been recalled and she does express concern about continuing with therapy. Discontinue metformin and start Januvia 50 mg. Medication side effects discussed in detail and she has no contraindication to therapy. Requested Prescriptions     Signed Prescriptions Disp Refills    SITagliptin (JANUVIA) 50 mg tablet 90 Tab 1     Sig: Take 1 Tab by mouth daily.      Authorizing Provider: Jeovanny Phillip

## 2020-07-16 ENCOUNTER — TELEPHONE (OUTPATIENT)
Dept: FAMILY MEDICINE CLINIC | Age: 60
End: 2020-07-16

## 2020-07-16 DIAGNOSIS — E11.9 TYPE 2 DIABETES MELLITUS WITHOUT COMPLICATION, WITHOUT LONG-TERM CURRENT USE OF INSULIN (HCC): Primary | ICD-10-CM

## 2020-07-16 NOTE — TELEPHONE ENCOUNTER
Patient called and stated that the4 payton I not working and she would like to go back to metformin. [facility] has one in stock that was not recalled. Please send over new script for metformin.

## 2020-07-27 RX ORDER — METFORMIN HYDROCHLORIDE 500 MG/1
1000 TABLET, EXTENDED RELEASE ORAL 2 TIMES DAILY WITH MEALS
Qty: 360 TAB | Refills: 3 | Status: SHIPPED | OUTPATIENT
Start: 2020-07-27 | End: 2021-02-25

## 2020-07-27 NOTE — TELEPHONE ENCOUNTER
Patient is going out of town in 3 days and needs this to be resolved. Please call her at [phone] on what Dr. Kristie Hidalgo would like to do.

## 2020-07-27 NOTE — TELEPHONE ENCOUNTER
Patient reports that BG have not been doing well since starting Januvia. Reports  this morning. Will restart metformin therapy as previously prescribed. Stop Januvia. Pt verbalizes understanding.

## 2020-09-14 DIAGNOSIS — E11.9 TYPE 2 DIABETES MELLITUS WITHOUT COMPLICATION, WITHOUT LONG-TERM CURRENT USE OF INSULIN (HCC): ICD-10-CM

## 2020-09-14 DIAGNOSIS — E03.9 ACQUIRED HYPOTHYROIDISM: ICD-10-CM

## 2020-09-16 RX ORDER — GLIPIZIDE 10 MG/1
TABLET, FILM COATED, EXTENDED RELEASE ORAL
Qty: 300 TAB | Refills: 0 | Status: SHIPPED | OUTPATIENT
Start: 2020-09-16 | End: 2021-02-25

## 2020-09-16 RX ORDER — LEVOTHYROXINE SODIUM 100 UG/1
TABLET ORAL
Qty: 90 TAB | Refills: 0 | Status: SHIPPED | OUTPATIENT
Start: 2020-09-16 | End: 2020-11-03

## 2020-10-29 DIAGNOSIS — E03.9 ACQUIRED HYPOTHYROIDISM: ICD-10-CM

## 2020-11-03 ENCOUNTER — VIRTUAL VISIT (OUTPATIENT)
Dept: FAMILY MEDICINE CLINIC | Age: 60
End: 2020-11-03
Payer: MEDICAID

## 2020-11-03 DIAGNOSIS — E03.9 ACQUIRED HYPOTHYROIDISM: ICD-10-CM

## 2020-11-03 DIAGNOSIS — E11.9 TYPE 2 DIABETES MELLITUS WITHOUT COMPLICATION, WITHOUT LONG-TERM CURRENT USE OF INSULIN (HCC): Primary | ICD-10-CM

## 2020-11-03 DIAGNOSIS — E34.9 HORMONE DISTURBANCE: ICD-10-CM

## 2020-11-03 PROCEDURE — 3052F HG A1C>EQUAL 8.0%<EQUAL 9.0%: CPT | Performed by: FAMILY MEDICINE

## 2020-11-03 PROCEDURE — 99213 OFFICE O/P EST LOW 20 MIN: CPT | Performed by: FAMILY MEDICINE

## 2020-11-03 RX ORDER — LEVOTHYROXINE SODIUM 100 UG/1
TABLET ORAL
Qty: 90 TAB | Refills: 1 | Status: SHIPPED | OUTPATIENT
Start: 2020-11-03 | End: 2021-02-25

## 2020-11-03 NOTE — PROGRESS NOTES
Philipp Brown is a 56 y.o. female who was seen by synchronous (real-time) audio-video technology on 11/3/2020 for Diabetes      Assessment & Plan:   Diagnoses and all orders for this visit:    1. Type 2 diabetes mellitus without complication, without long-term current use of insulin ([facility] [address]): self-discontinued medications. Check labs. -     HEMOGLOBIN A1C WITH EAG; Future  -     METABOLIC PANEL, COMPREHENSIVE; Future  -     LIPID PANEL; Future  -     CBC WITH AUTOMATED DIFF; Future    2. Acquired hypothyroidism  -     TSH 3RD GENERATION; Future  -     T4, FREE; Future    3. Hormone disturbance  -     PROGESTERONE; Future  -     ESTROGENS, FRACTIONATED; Future  -     TESTOSTERONE, TOTAL, FEMALE/CHILD; Future  712  Subjective:   \"I have been absolutely miserable with the glipizide and metformin\". Reports diarrhea, not feeling well. States \"I got sick and tired of being sick and tired. \" Her son was seen by an apothecary and reports that she was told that she could take insulin. BG monitoring: restarted a few weeks ago, having values in the 200's but has not taken metformin or glipizide over the past week or more   Diet: has been watching her carbohydrate intake    Compound hormone Rx natural plant based formula - filled by [facility]; states that there is a lab test that needs to be performed prior to starting. Prior to Admission medications    Medication Sig Start Date End Date Taking? Authorizing Provider   levothyroxine (SYNTHROID) 100 mcg tablet TAKE 1 TABLET BY MOUTH ONCE DAILY BEFORE BREAKFAST 11/3/20  Yes Radha Roberts MD   VALERIAN ROOT by Does Not Apply route nightly. Yes Provider, Historical   escitalopram oxalate (LEXAPRO) 20 mg tablet TAKE ONE AND ONE-HALF (1 & 1/2) TABLET BY MOUTH DAILY 3/6/20  Yes Radha Roberts MD   triamterene-hydroCHLOROthiazide (MAXZIDE) 37.5-25 mg per tablet Take 0.5 Tabs by mouth daily.  3/6/20  Yes Radha Roberts MD   propranolol (INDERAL) 40 mg tablet Take 1 Tab by mouth nightly. 1/17/20  Yes Milton Musa MD   prenatal vit/iron fum/folic ac (PRENATAL-FOLIC ACID PO) Take 1 Tab by mouth daily. Yes Provider, Historical   ferrous fumarate/vit Bcomp,C (SUPER B COMPLEX PO) Take 1 Tab by mouth daily. Yes Provider, Historical   biotin 10,000 mcg cap Take 1 Cap by mouth two (2) times a day. Yes Provider, Historical   potassium 99 mg tablet Take 2 Tabs by mouth daily. Yes Provider, Historical   lysine (L-LYSINE) 500 mg tab tablet Take 1 Tab by mouth daily. Yes Provider, Historical   cholecalciferol (VITAMIN D3) 1,000 unit cap Take 1,000 Units by mouth daily. Yes Provider, Historical   glucosamine-chondroitin (ARTHX) 500-400 mg cap Take 1 Cap by mouth two (2) times a day. Yes Provider, Historical   glipiZIDE SR (GLUCOTROL XL) 10 mg CR tablet Take 2 tablets by mouth once daily 9/16/20   Milton Musa MD   levothyroxine (SYNTHROID) 100 mcg tablet TAKE 1 TABLET BY MOUTH ONCE DAILY BEFORE BREAKFAST 9/16/20 11/3/20  Milton Musa MD   metFORMIN ER (GLUCOPHAGE XR) 500 mg tablet Take 2 Tabs by mouth two (2) times daily (with meals). 7/27/20   Milton Musa MD   loperamide (IMMODIUM) 2 mg tablet Take 2 mg by mouth four (4) times daily as needed for Diarrhea. 11/3/20  Provider, Historical   clotrimazole (MYCELEX) 10 mg sky Take 1 Tab by mouth daily. 10/17/19 11/3/20  Provider, Historical     Current Outpatient Medications   Medication Sig Dispense Refill    levothyroxine (SYNTHROID) 100 mcg tablet TAKE 1 TABLET BY MOUTH ONCE DAILY BEFORE BREAKFAST 90 Tab 1    VALERIAN ROOT by Does Not Apply route nightly.  escitalopram oxalate (LEXAPRO) 20 mg tablet TAKE ONE AND ONE-HALF (1 & 1/2) TABLET BY MOUTH DAILY 135 Tab 1    triamterene-hydroCHLOROthiazide (MAXZIDE) 37.5-25 mg per tablet Take 0.5 Tabs by mouth daily. 45 Tab 1    propranolol (INDERAL) 40 mg tablet Take 1 Tab by mouth nightly.  90 Tab 1    prenatal vit/iron fum/folic ac (PRENATAL-FOLIC ACID PO) Take 1 Tab by mouth daily.  ferrous fumarate/vit Bcomp,C (SUPER B COMPLEX PO) Take 1 Tab by mouth daily.  biotin 10,000 mcg cap Take 1 Cap by mouth two (2) times a day.  potassium 99 mg tablet Take 2 Tabs by mouth daily.  lysine (L-LYSINE) 500 mg tab tablet Take 1 Tab by mouth daily.  cholecalciferol (VITAMIN D3) 1,000 unit cap Take 1,000 Units by mouth daily.  glucosamine-chondroitin (ARTHX) 500-400 mg cap Take 1 Cap by mouth two (2) times a day.  glipiZIDE SR (GLUCOTROL XL) 10 mg CR tablet Take 2 tablets by mouth once daily 300 Tab 0    metFORMIN ER (GLUCOPHAGE XR) 500 mg tablet Take 2 Tabs by mouth two (2) times daily (with meals). 360 Tab 3     No Known Allergies  Past Medical History:   Diagnosis Date    Depression     Diabetes ([facility] [address]     Fluid retention     Hypothyroidism     Migraine headache      Social History     Tobacco Use    Smoking status: Never Smoker    Smokeless tobacco: Never Used   Substance Use Topics    Alcohol use: Yes     Frequency: Monthly or less     Drinks per session: 1 or 2       ROS    Objective:   No flowsheet data found. General: alert, cooperative, no distress   Mental  status: normal mood, behavior, speech, dress, motor activity, and thought processes, able to follow commands   HENT: NCAT   Neck: no visualized mass   Resp: no respiratory distress   Neuro: no gross deficits   Skin: no discoloration or lesions of concern on visible areas   Psychiatric: normal affect, consistent with stated mood, no evidence of hallucinations       We discussed the expected course, resolution and complications of the diagnosis(es) in detail. Medication risks, benefits, costs, interactions, and alternatives were discussed as indicated. I advised her to contact the office if her condition worsens, changes or fails to improve as anticipated. She expressed understanding with the diagnosis(es) and plan.        Philipp Brown, who was evaluated through a patient-initiated, synchronous (real-time) audio-video encounter, and/or her healthcare decision maker, is aware that it is a billable service, with coverage as determined by her insurance carrier. She provided verbal consent to proceed: Yes, and patient identification was verified. It was conducted pursuant to the emergency declaration under the [address] [place] [address] and the [employer] Act. A caregiver was present when appropriate. Ability to conduct physical exam was limited. I was in the office. The patient was at home.       George Lucio MD

## 2020-11-04 ENCOUNTER — APPOINTMENT (OUTPATIENT)
Dept: FAMILY MEDICINE CLINIC | Age: 60
End: 2020-11-04

## 2020-11-04 DIAGNOSIS — E03.9 ACQUIRED HYPOTHYROIDISM: ICD-10-CM

## 2020-11-04 DIAGNOSIS — E34.9 HORMONE DISTURBANCE: ICD-10-CM

## 2020-11-04 DIAGNOSIS — E11.9 TYPE 2 DIABETES MELLITUS WITHOUT COMPLICATION, WITHOUT LONG-TERM CURRENT USE OF INSULIN (HCC): ICD-10-CM

## 2020-11-05 LAB
ALBUMIN SERPL-MCNC: 4.2 G/DL (ref 3.5–5)
ALBUMIN/GLOB SERPL: 1.6 {RATIO} (ref 1.1–2.2)
ALP SERPL-CCNC: 92 U/L (ref 45–117)
ALT SERPL-CCNC: 35 U/L (ref 12–78)
ANION GAP SERPL CALC-SCNC: 6 MMOL/L (ref 5–15)
AST SERPL-CCNC: 18 U/L (ref 15–37)
BASOPHILS # BLD: 0.1 K/UL (ref 0–0.1)
BASOPHILS NFR BLD: 1 % (ref 0–1)
BILIRUB SERPL-MCNC: 0.5 MG/DL (ref 0.2–1)
BUN SERPL-MCNC: 17 MG/DL (ref 6–20)
BUN/CREAT SERPL: 20 (ref 12–20)
CALCIUM SERPL-MCNC: 9.9 MG/DL (ref 8.5–10.1)
CHLORIDE SERPL-SCNC: 103 MMOL/L (ref 97–108)
CHOLEST SERPL-MCNC: 235 MG/DL
CO2 SERPL-SCNC: 30 MMOL/L (ref 21–32)
CREAT SERPL-MCNC: 0.87 MG/DL (ref 0.55–1.02)
DIFFERENTIAL METHOD BLD: ABNORMAL
EOSINOPHIL # BLD: 0.2 K/UL (ref 0–0.4)
EOSINOPHIL NFR BLD: 3 % (ref 0–7)
ERYTHROCYTE [DISTWIDTH] IN BLOOD BY AUTOMATED COUNT: 13.7 % (ref 11.5–14.5)
EST. AVERAGE GLUCOSE BLD GHB EST-MCNC: 200 MG/DL
GLOBULIN SER CALC-MCNC: 2.6 G/DL (ref 2–4)
GLUCOSE SERPL-MCNC: 249 MG/DL (ref 65–100)
HBA1C MFR BLD: 8.6 % (ref 4–5.6)
HCT VFR BLD AUTO: 45 % (ref 35–47)
HDLC SERPL-MCNC: 63 MG/DL
HDLC SERPL: 3.7 {RATIO} (ref 0–5)
HGB BLD-MCNC: 14.4 G/DL (ref 11.5–16)
IMM GRANULOCYTES # BLD AUTO: 0 K/UL (ref 0–0.04)
IMM GRANULOCYTES NFR BLD AUTO: 1 % (ref 0–0.5)
LDLC SERPL CALC-MCNC: 135.8 MG/DL (ref 0–100)
LIPID PROFILE,FLP: ABNORMAL
LYMPHOCYTES # BLD: 1.2 K/UL (ref 0.8–3.5)
LYMPHOCYTES NFR BLD: 21 % (ref 12–49)
MCH RBC QN AUTO: 29.5 PG (ref 26–34)
MCHC RBC AUTO-ENTMCNC: 32 G/DL (ref 30–36.5)
MCV RBC AUTO: 92.2 FL (ref 80–99)
MONOCYTES # BLD: 0.4 K/UL (ref 0–1)
MONOCYTES NFR BLD: 8 % (ref 5–13)
NEUTS SEG # BLD: 3.7 K/UL (ref 1.8–8)
NEUTS SEG NFR BLD: 66 % (ref 32–75)
NRBC # BLD: 0 K/UL (ref 0–0.01)
NRBC BLD-RTO: 0 PER 100 WBC
PLATELET # BLD AUTO: 326 K/UL (ref 150–400)
PMV BLD AUTO: 9.1 FL (ref 8.9–12.9)
POTASSIUM SERPL-SCNC: 4.6 MMOL/L (ref 3.5–5.1)
PROT SERPL-MCNC: 6.8 G/DL (ref 6.4–8.2)
RBC # BLD AUTO: 4.88 M/UL (ref 3.8–5.2)
SODIUM SERPL-SCNC: 139 MMOL/L (ref 136–145)
T4 FREE SERPL-MCNC: 1.3 NG/DL (ref 0.8–1.5)
TRIGL SERPL-MCNC: 181 MG/DL (ref ?–150)
TSH SERPL DL<=0.05 MIU/L-ACNC: 4.7 UIU/ML (ref 0.36–3.74)
VLDLC SERPL CALC-MCNC: 36.2 MG/DL
WBC # BLD AUTO: 5.6 K/UL (ref 3.6–11)

## 2020-11-06 LAB — PROGEST SERPL-MCNC: <0.1 NG/ML

## 2020-11-09 LAB — TESTOST SERPL-MCNC: 30.9 NG/DL (ref 7–40)

## 2020-11-11 ENCOUNTER — TELEPHONE (OUTPATIENT)
Dept: FAMILY MEDICINE CLINIC | Age: 60
End: 2020-11-11

## 2020-11-11 NOTE — TELEPHONE ENCOUNTER
Caller's first and last name: N/A   Reason for call: Blood work results   Callback required yes/no and why: yes   Best contact number(s): [phone]   Details to clarify the request: Pt had blood work done last week and would like the results if they are available. Pt also wanted to discuss alternative treatment for diabetes. Pt is going out of [facility] 11/12/20 and would like to be reached sooner if possible.

## 2020-11-12 LAB
ESTRADIOL SERPL-MCNC: 19.1 PG/ML
ESTRONE SERPL-MCNC: 131 PG/ML

## 2020-12-04 RX ORDER — PROPRANOLOL HYDROCHLORIDE 40 MG/1
TABLET ORAL
Qty: 150 TAB | Refills: 0 | Status: SHIPPED | OUTPATIENT
Start: 2020-12-04 | End: 2021-02-25

## 2020-12-12 ENCOUNTER — PATIENT MESSAGE (OUTPATIENT)
Dept: FAMILY MEDICINE CLINIC | Age: 60
End: 2020-12-12

## 2020-12-12 ENCOUNTER — TELEPHONE (OUTPATIENT)
Dept: FAMILY MEDICINE CLINIC | Age: 60
End: 2020-12-12

## 2021-02-17 ENCOUNTER — TELEPHONE (OUTPATIENT)
Dept: FAMILY MEDICINE CLINIC | Age: 61
End: 2021-02-17

## 2021-02-17 NOTE — TELEPHONE ENCOUNTER
Pt called and asked for results from lab work done to be put on plant based hormones. Informed pt that I will send this back to nurse to see if they can get those to her.  Best call back number:   [phone]

## 2021-02-19 NOTE — TELEPHONE ENCOUNTER
----- Message from Kaley Sanchez sent at 2/19/2021 10:12 AM EST -----  Regarding: Dr. Evelyn Cooper Message/Vendor Calls    Caller's first and last name: n/a      Reason for call: Would like results to blood work. Has been waiting for a call back all week.       Callback required yes/no and why: yes      Best contact number(s): [phone]      Details to clarify the request: n/a      Kaley Sanchez

## 2021-02-25 ENCOUNTER — VIRTUAL VISIT (OUTPATIENT)
Dept: FAMILY MEDICINE CLINIC | Age: 61
End: 2021-02-25
Payer: MEDICAID

## 2021-02-25 DIAGNOSIS — G89.29 CHRONIC NONINTRACTABLE HEADACHE, UNSPECIFIED HEADACHE TYPE: Primary | ICD-10-CM

## 2021-02-25 DIAGNOSIS — R51.9 CHRONIC NONINTRACTABLE HEADACHE, UNSPECIFIED HEADACHE TYPE: Primary | ICD-10-CM

## 2021-02-25 PROCEDURE — 99212 OFFICE O/P EST SF 10 MIN: CPT | Performed by: FAMILY MEDICINE

## 2021-02-25 RX ORDER — ZINC GLUCONATE 10 MG
LOZENGE ORAL 2 TIMES DAILY
COMMUNITY
End: 2021-07-07 | Stop reason: SDUPTHER

## 2021-02-25 NOTE — PROGRESS NOTES
Sarahi Bingham is a 59 y.o. female    Chief Complaint   Patient presents with    Head Pain     pt states over 3 months she has a chronic head aches. No OTC meds are working. Health Maintenance Due   Topic Date Due    Pneumococcal 0-64 years (1 of 1 - PPSV23) 02/22/1964    Eye Exam Retinal or Dilated  02/22/1968    COVID-19 Vaccine (1 of 2) 02/22/1974    DTaP/Tdap/Td series (1 - Tdap) 02/22/1979    PAP AKA CERVICAL CYTOLOGY  02/22/1979    Shingrix Vaccine Age 50> (1 of 2) 02/22/2008    Breast Cancer Screen Mammogram  02/22/2008    Foot Exam Q1  03/11/2020    Flu Vaccine (1) 09/01/2020    MICROALBUMIN Q1  01/24/2021    Colorectal Cancer Screening Combo  02/01/2021       3 most recent PHQ Screens 2/25/2021   PHQ Not Done Active Diagnosis of Depression or Bipolar Disorder   Little interest or pleasure in doing things -   Feeling down, depressed, irritable, or hopeless -   Total Score PHQ 2 -     Abuse Screening Questionnaire 2/25/2021   Do you ever feel afraid of your partner? N   Are you in a relationship with someone who physically or mentally threatens you? N   Is it safe for you to go home? Y       1. Have you been to the ER, urgent care clinic since your last visit? Hospitalized since your last visit? No    2. Have you seen or consulted any other health care providers outside of the [address] [place] [address] since your last visit? Include any pap smears or colon screening.  No

## 2021-02-25 NOTE — PROGRESS NOTES
Vanesa Fair is a 59 y.o. female who was seen by synchronous (real-time) audio-video technology on 2/25/2021 for Head Pain (pt states over 3 months she has a chronic headaches. No OTC meds are working. )      Assessment & Plan:   Diagnoses and all orders for this visit:    1. Chronic nonintractable headache, unspecified headache type: would like to treat this naturally. Patient interested in being evaluated for medical marijuana; may look up providers via the [facility]. I have recommended that she restart checking her BG and BP as I am concerned that this may be contributing to her symptoms. Recommend emergent evaluation if there is a change in symptoms or new symptom arise (ie visual disturbance, speech disturbance, syncope, weakness or paresthesias). Subjective:   Reports headaches over the past 3 months. Has previous h/o migraines, but current headaches are not migrainous - has not had sensitivity to light, no nausea. Headaches have not been debilitating, but have been consistent. Today's headache is not as bad as previous. She has taken OTC medications without relief. Noted after she stopped taking her blood pressure and diabetes medications. We have previously discussed and she would prefer to treat with natural therapies. Has not been monitoring her BP or BG. Prior to Admission medications    Medication Sig Start Date End Date Taking? Authorizing Provider   magnesium 250 mg tab Take  by mouth two (2) times a day. Yes Provider, Historical   VALERIAN ROOT by Does Not Apply route nightly. Yes Provider, Historical   prenatal vit/iron fum/folic ac (PRENATAL-FOLIC ACID PO) Take 1 Tab by mouth daily. Yes Provider, Historical   ferrous fumarate/vit Bcomp,C (SUPER B COMPLEX PO) Take 1 Tab by mouth daily. Yes Provider, Historical   biotin 10,000 mcg cap Take 1 Cap by mouth two (2) times a day. Yes Provider, Historical   potassium 99 mg tablet Take 2 Tabs by mouth daily.    Yes Provider, Historical   lysine (L-LYSINE) 500 mg tab tablet Take 1 Tab by mouth daily. Yes Provider, Historical   cholecalciferol (VITAMIN D3) 1,000 unit cap Take 1,000 Units by mouth daily. Yes Provider, Historical   glucosamine-chondroitin (ARTHX) 500-400 mg cap Take 1 Cap by mouth two (2) times a day. Yes Provider, Historical   propranoloL (INDERAL) 40 mg tablet Take 1 tablet by mouth nightly 12/4/20 2/25/21  Hesham Nunez MD   levothyroxine (SYNTHROID) 100 mcg tablet TAKE 1 TABLET BY MOUTH ONCE DAILY BEFORE BREAKFAST 11/3/20 2/25/21  Hesham Nunez MD   glipiZIDE SR (GLUCOTROL XL) 10 mg CR tablet Take 2 tablets by mouth once daily 9/16/20 2/25/21  Hesham Nunez MD   metFORMIN ER (GLUCOPHAGE XR) 500 mg tablet Take 2 Tabs by mouth two (2) times daily (with meals). 7/27/20 2/25/21  Hesham Nunez MD   escitalopram oxalate (LEXAPRO) 20 mg tablet TAKE ONE AND ONE-HALF (1 & 1/2) TABLET BY MOUTH DAILY 3/6/20 2/25/21  Hesham Nunez MD   triamterene-hydroCHLOROthiazide (MAXZIDE) 37.5-25 mg per tablet Take 0.5 Tabs by mouth daily. 3/6/20 2/25/21  Hesham Nunez MD       No Known Allergies       Past Medical History:   Diagnosis Date    Depression     Diabetes ([facility] [address]     Fluid retention     Hypothyroidism     Migraine headache        Social History     Tobacco Use    Smoking status: Never Smoker    Smokeless tobacco: Never Used   Substance Use Topics    Alcohol use: Yes     Frequency: Monthly or less     Drinks per session: 1 or 2       ROS   Pertinent items noted in HPI    Objective:   No flowsheet data found.    General: alert, cooperative, no distress   Mental  status: normal mood, behavior, speech, dress, motor activity, and thought processes, able to follow commands   HENT: NCAT   Neck: no visualized mass   Resp: no respiratory distress   Neuro: no gross deficits   Skin: no discoloration or lesions of concern on visible areas   Psychiatric: normal affect, consistent with stated mood, no evidence of hallucinations       We discussed the expected course, resolution and complications of the diagnosis(es) in detail. Medication risks, benefits, costs, interactions, and alternatives were discussed as indicated. I advised her to contact the office if her condition worsens, changes or fails to improve as anticipated. She expressed understanding with the diagnosis(es) and plan. Daniel Meraz, who was evaluated through a patient-initiated, synchronous (real-time) audio-video encounter, and/or her healthcare decision maker, is aware that it is a billable service, with coverage as determined by her insurance carrier. She provided verbal consent to proceed: Yes, and patient identification was verified. It was conducted pursuant to the emergency declaration under the [address] [place] [address] authority and the [employer] and [employer]ar General Act. A caregiver was present when appropriate. Ability to conduct physical exam was limited. I was in the office. The patient was at home.       Lynn Can MD

## 2021-05-02 ENCOUNTER — HOSPITAL ENCOUNTER (EMERGENCY)
Age: 61
Discharge: HOME | End: 2021-05-02
Attending: EMERGENCY MEDICINE
Payer: MEDICAID

## 2021-05-02 VITALS
BODY MASS INDEX: 31.64 KG/M2 | OXYGEN SATURATION: 96 % | SYSTOLIC BLOOD PRESSURE: 162 MMHG | TEMPERATURE: 97.9 F | HEART RATE: 83 BPM | RESPIRATION RATE: 96 BRPM | HEIGHT: 63 IN | DIASTOLIC BLOOD PRESSURE: 83 MMHG | WEIGHT: 178.57 LBS

## 2021-05-02 DIAGNOSIS — L72.0 EPIDERMAL INCLUSION CYST: Primary | ICD-10-CM

## 2021-05-02 PROCEDURE — 99281 EMR DPT VST MAYX REQ PHY/QHP: CPT

## 2021-05-02 RX ORDER — AA/PROT/LYSINE/METHIO/VIT C/B6 50-12.5 MG
TABLET ORAL
COMMUNITY

## 2021-05-03 NOTE — ED TRIAGE NOTES
Patient complains of left breast pain x 3-4 days. Patient reports having a cyst in same location for years.

## 2021-05-03 NOTE — ED NOTES
Not assessed by RN. Seen and discharge plan discussed per Dr. Galina Isaac. Patient discharged home with follow up instructions. Ambulatory out of ED.

## 2021-05-03 NOTE — ED PROVIDER NOTES
Brayden Johnson is a 60 yo F with history of prior breast abscess who presents to the Ed with concern for recurrence. She denies pain but states that when she was bathing tonight she noticed that she had what looked like a large black head near her previous abscess scar. She squeezed it and got out the head and more white cheesy material.  She is concerned that she was not able to get out everything and that it will close it back up and get infected. She denies pain, redness in the area or fever.              Past Medical History:   Diagnosis Date    Depression     Diabetes ([facility] [address]     Fluid retention     Hypothyroidism     Migraine headache        Past Surgical History:   Procedure Laterality Date    HX  SECTION      in 10 and 24    HX CHOLECYSTECTOMY           Family History:   Problem Relation Age of Onset    No Known Problems Mother     Heart Failure Father        Social History     Socioeconomic History    Marital status: UNKNOWN     Spouse name: Not on file    Number of children: Not on file    Years of education: Not on file    Highest education level: Not on file   Occupational History    Not on file   Social Needs    Financial resource strain: Not on file    Food insecurity     Worry: Not on file     Inability: Not on file    Transportation needs     Medical: Not on file     Non-medical: Not on file   Tobacco Use    Smoking status: Never Smoker    Smokeless tobacco: Never Used   Substance and Sexual Activity    Alcohol use: Yes     Frequency: Monthly or less     Drinks per session: 1 or 2    Drug use: No    Sexual activity: Not on file   Lifestyle    Physical activity     Days per week: Not on file     Minutes per session: Not on file    Stress: Not on file   Relationships    Social connections     Talks on phone: Not on file     Gets together: Not on file     Attends Yarsani service: Not on file     Active member of club or organization: Not on file     Attends meetings of clubs or organizations: Not on file     Relationship status: Not on file    Intimate partner violence     Fear of current or ex partner: Not on file     Emotionally abused: Not on file     Physically abused: Not on file     Forced sexual activity: Not on file   Other Topics Concern    Not on file   Social History Narrative    Not on file         ALLERGIES: Patient has no known allergies. Review of Systems   Constitutional: Negative for fever. HENT: Negative for sore throat. Eyes: Negative for visual disturbance. Respiratory: Negative for cough. Cardiovascular: Negative for chest pain. Gastrointestinal: Negative for abdominal pain. Genitourinary: Negative for dysuria. Musculoskeletal: Negative for back pain. Skin: Positive for wound. Negative for rash. Neurological: Negative for headaches. Vitals:    05/02/21 2126   BP: (!) 162/83   Pulse: 83   Resp: (!) 96   Temp: 97.9 °F (36.6 °C)   SpO2: 96%   Weight: 81 kg (178 lb 9.2 oz)   Height: 5' 3\" (1.6 m)            Physical Exam  Vitals signs and nursing note reviewed. Constitutional:       General: She is not in acute distress. Appearance: She is well-developed. HENT:      Head: Normocephalic and atraumatic. Eyes:      Conjunctiva/sclera: Conjunctivae normal.   Neck:      Musculoskeletal: Normal range of motion. Trachea: Phonation normal.   Cardiovascular:      Rate and Rhythm: Normal rate. Pulmonary:      Effort: Pulmonary effort is normal. No respiratory distress. Chest:      Comments: Epidermal inclusion cyst near previous scar in left axillary breast.  No surrounding erythema. Abdominal:      General: There is no distension. Musculoskeletal: Normal range of motion. General: No tenderness. Skin:     General: Skin is warm and dry. Neurological:      Mental Status: She is alert. She is not disoriented. Motor: No abnormal muscle tone.               MDM   Epidermal Inclusion Cyst - No evidence of infection. Advised follow-up with surgery for excision.      Procedures

## 2021-05-13 ENCOUNTER — HOSPITAL ENCOUNTER (OUTPATIENT)
Dept: PREADMISSION TESTING | Age: 61
Discharge: HOME | End: 2021-05-13
Payer: MEDICAID

## 2021-05-13 VITALS
HEIGHT: 63 IN | DIASTOLIC BLOOD PRESSURE: 81 MMHG | TEMPERATURE: 97.1 F | HEART RATE: 71 BPM | OXYGEN SATURATION: 98 % | BODY MASS INDEX: 31.05 KG/M2 | WEIGHT: 175.27 LBS | SYSTOLIC BLOOD PRESSURE: 156 MMHG

## 2021-05-13 LAB
ANION GAP SERPL CALC-SCNC: 2 MMOL/L (ref 5–15)
BUN SERPL-MCNC: 13 MG/DL (ref 6–20)
BUN/CREAT SERPL: 14 (ref 12–20)
CALCIUM SERPL-MCNC: 9.3 MG/DL (ref 8.5–10.1)
CHLORIDE SERPL-SCNC: 100 MMOL/L (ref 97–108)
CO2 SERPL-SCNC: 32 MMOL/L (ref 21–32)
CREAT SERPL-MCNC: 0.95 MG/DL (ref 0.55–1.02)
GLUCOSE SERPL-MCNC: 406 MG/DL (ref 65–100)
POTASSIUM SERPL-SCNC: 4.8 MMOL/L (ref 3.5–5.1)
SODIUM SERPL-SCNC: 134 MMOL/L (ref 136–145)

## 2021-05-13 PROCEDURE — 36415 COLL VENOUS BLD VENIPUNCTURE: CPT

## 2021-05-13 PROCEDURE — 80048 BASIC METABOLIC PNL TOTAL CA: CPT

## 2021-05-13 PROCEDURE — 93005 ELECTROCARDIOGRAM TRACING: CPT

## 2021-05-13 RX ORDER — PRASTERONE (DHEA) 50 MG
1 TABLET ORAL DAILY
COMMUNITY
End: 2021-12-27 | Stop reason: ALTCHOICE

## 2021-05-13 RX ORDER — CALCIUM CARB/VITAMIN D3/VIT K1 500-500-40
1 TABLET,CHEWABLE ORAL DAILY
COMMUNITY
End: 2021-12-27 | Stop reason: ALTCHOICE

## 2021-05-13 RX ORDER — CALCIUM CARBONATE 260MG(650)
2 TABLET,CHEWABLE ORAL DAILY
COMMUNITY
End: 2021-07-07 | Stop reason: ALTCHOICE

## 2021-05-13 RX ORDER — MICONAZOLE NITRATE 2 %
2 CREAM WITH APPLICATOR VAGINAL 2 TIMES DAILY
COMMUNITY

## 2021-05-13 RX ORDER — MELATONIN 5 MG
5 CAPSULE ORAL
COMMUNITY

## 2021-05-13 NOTE — PERIOP NOTES
I spoke to Santy Gutierrez at [address] office to report blood glucose of 406 and ask her to have  review the BMP results from today.

## 2021-05-13 NOTE — PERIOP NOTES
[address] [address] [facility]   MAIN OR                                  [phone]   MAIN PRE OP                          [phone]                                                                                AMBULATORY PRE OP          [phone]  PRE-ADMISSION TESTING    [phone]   Surgery Date: Friday 5/14/21        Is surgery arrival time given by surgeon? NO  If NO, [facility] staff will call you between 3 and 7pm the day before your surgery with your arrival time. (If your surgery is on a Monday, we will call you the Friday before.)    Call [phone] after 7pm Monday-Friday if you did not receive this call. INSTRUCTIONS BEFORE YOUR SURGERY   When You  Arrive Arrive at the 2nd [address] [facility] on the day of your surgery  Have your insurance card, photo ID, and any copayment (if needed)   Food   and   Drink NO food or drink after midnight the night before surgery    This means NO water, gum, mints, coffee, juice, etc.  No alcohol (beer, wine, liquor) 24 hours before and after surgery   Medications to   TAKE   Morning of Surgery MEDICATIONS TO TAKE THE MORNING OF SURGERY WITH A SIP OF WATER:    None      Medications  To  STOP      7 days before surgery  Non-Steroidal anti-inflammatory Drugs (NSAID's): for example, Ibuprofen (Advil, Motrin), Naproxen (Aleve)   Aspirin, if taking for pain    Herbal supplements, vitamins, and fish oil   Other:  (Pain medications not listed above, including Tylenol may be taken)   Blood  Thinners     Bathing Clothing  Jewelry  Valuables      If you shower the morning of surgery, please do not apply anything to your skin (lotions, powders, deodorant, or makeup, especially mascara)   Follow Chlorhexidine Care Fusion body wash instructions provided to you during PAT appointment. Begin 3 days prior to surgery.    Do not shave or trim anywhere 24 hours before surgery   Wear your hair loose or down; no pony-tails, buns, or metal hair clips   Wear loose, comfortable, clean clothes   Wear glasses instead of contacts  [address], valuables, and jewelry, including body piercings, at home   Going Home - or Spending the Night  SAME-DAY SURGERY: You must have a responsible adult drive you home and stay with you 24 hours after surgery   ADMITS: If your doctor is keeping you in the hospital after surgery, leave personal belongings/luggage in your car until you have a hospital room number. Hospital discharge time is 12 noon  Drivers must be here before 12 noon unless you are told differently   Special Instructions      Follow all instructions so your surgery wont be cancelled. Please, be on time. If a situation occurs and you are delayed the day of surgery, call [phone]     If your physical condition changes (like a fever, cold, flu, etc.) call your surgeon. Home medication(s) reviewed and verified via      LIST   VERBAL   during PAT appointment. The patient was contacted by     IN-PERSON  The patient verbalizes understanding of all instructions and   DOES   DOES NOT   need reinforcement.

## 2021-05-14 ENCOUNTER — VIRTUAL VISIT (OUTPATIENT)
Dept: FAMILY MEDICINE CLINIC | Age: 61
End: 2021-05-14
Payer: MEDICAID

## 2021-05-14 ENCOUNTER — TELEPHONE (OUTPATIENT)
Dept: FAMILY MEDICINE CLINIC | Age: 61
End: 2021-05-14

## 2021-05-14 DIAGNOSIS — I10 ESSENTIAL HYPERTENSION: ICD-10-CM

## 2021-05-14 DIAGNOSIS — E11.9 TYPE 2 DIABETES MELLITUS WITHOUT COMPLICATION, WITHOUT LONG-TERM CURRENT USE OF INSULIN (HCC): Primary | ICD-10-CM

## 2021-05-14 LAB
ATRIAL RATE: 75 BPM
CALCULATED P AXIS, ECG09: 46 DEGREES
CALCULATED R AXIS, ECG10: 10 DEGREES
CALCULATED T AXIS, ECG11: 88 DEGREES
DIAGNOSIS, 93000: NORMAL
P-R INTERVAL, ECG05: 148 MS
Q-T INTERVAL, ECG07: 424 MS
QRS DURATION, ECG06: 82 MS
QTC CALCULATION (BEZET), ECG08: 473 MS
VENTRICULAR RATE, ECG03: 75 BPM

## 2021-05-14 PROCEDURE — 99442 PR PHYS/QHP TELEPHONE EVALUATION 11-20 MIN: CPT | Performed by: FAMILY MEDICINE

## 2021-05-14 RX ORDER — PEN NEEDLE, DIABETIC 30 GX3/16"
NEEDLE, DISPOSABLE MISCELLANEOUS
Qty: 1 PACKAGE | Refills: 11 | Status: SHIPPED | OUTPATIENT
Start: 2021-05-14 | End: 2022-06-27 | Stop reason: SDUPTHER

## 2021-05-14 RX ORDER — HYDROCHLOROTHIAZIDE 25 MG/1
25 TABLET ORAL DAILY
Qty: 90 TAB | Refills: 1 | Status: SHIPPED | OUTPATIENT
Start: 2021-05-14 | End: 2022-03-25 | Stop reason: SDUPTHER

## 2021-05-14 RX ORDER — INSULIN GLARGINE 100 [IU]/ML
16 INJECTION, SOLUTION SUBCUTANEOUS
Qty: 15 ML | Refills: 0 | Status: SHIPPED | OUTPATIENT
Start: 2021-05-14 | End: 2021-07-14

## 2021-05-14 NOTE — TELEPHONE ENCOUNTER
Pt was supposed to be having surgery today and the blood sugar of 406 on 5/13/2021 and when asked if she was taking her medication she stated no and said dr Klaus Zepeda was aware on working on something else to help. Specialist has called pt two times with no answer to check on her.  Specialist would like pcp to  since hi risk of stroke    Win Do with Dr Lilo Erazo office # [phone]

## 2021-05-14 NOTE — PROGRESS NOTES
Pt was scheduled for surgery today but they had to cancel it because her glucose levels were elevated. VV-Pt is aware of billable appt depending on insurance plan. Preferred number     Pt verbally agrees to labs, orders, and others to be mailed to confirmed home address. Identified pt with two pt identifiers(name and ). Reviewed record in preparation for visit and have obtained necessary documentation. All patient medications has been reviewed. No chief complaint on file. Health Maintenance Review: Patient reminded of \"due or due soon\" health maintenance. I have asked the patient to contact his/her primary care provider (PCP) for follow-up on his/her health maintenance. No flowsheet data found. Wt Readings from Last 3 Encounters:   21 175 lb 4.3 oz (79.5 kg)   21 178 lb 9.2 oz (81 kg)   04/15/20 163 lb (73.9 kg)     Temp Readings from Last 3 Encounters:   21 97.1 °F (36.2 °C)   21 97.9 °F (36.6 °C)   20 97.8 °F (36.6 °C) (Oral)     BP Readings from Last 3 Encounters:   21 (!) 156/81   21 (!) 162/83   20 140/90     Pulse Readings from Last 3 Encounters:   21 71   21 83   20 98         Coordination of Care Questionnaire:   1) Have you been to an emergency room, urgent care, or hospitalized since your last visit? yes       2. Have seen or consulted any other health care provider since your last visit?  No

## 2021-06-22 ENCOUNTER — TELEPHONE (OUTPATIENT)
Dept: FAMILY MEDICINE CLINIC | Age: 61
End: 2021-06-22

## 2021-06-22 DIAGNOSIS — E03.9 ACQUIRED HYPOTHYROIDISM: ICD-10-CM

## 2021-06-22 RX ORDER — LEVOTHYROXINE SODIUM 100 UG/1
TABLET ORAL
Qty: 30 TABLET | Refills: 0 | Status: SHIPPED | OUTPATIENT
Start: 2021-06-22 | End: 2021-07-07 | Stop reason: SDUPTHER

## 2021-06-22 NOTE — TELEPHONE ENCOUNTER
Pt is due for check up and fasting labs at this time. Please advise and assist with scheduling. I will send her refill request through to Dr. Clint Cates for review since appts are sparse she may be able to give her a 30 day refill to take until her appt.

## 2021-06-22 NOTE — TELEPHONE ENCOUNTER
Patient said that she needs a rx refill for her levothyroxine.  I do not see this in her chart P)[phone]

## 2021-07-07 ENCOUNTER — OFFICE VISIT (OUTPATIENT)
Dept: FAMILY MEDICINE CLINIC | Age: 61
End: 2021-07-07
Payer: MEDICAID

## 2021-07-07 VITALS
RESPIRATION RATE: 16 BRPM | HEIGHT: 63 IN | WEIGHT: 173 LBS | HEART RATE: 74 BPM | SYSTOLIC BLOOD PRESSURE: 130 MMHG | OXYGEN SATURATION: 99 % | TEMPERATURE: 97.8 F | DIASTOLIC BLOOD PRESSURE: 74 MMHG | BODY MASS INDEX: 30.65 KG/M2

## 2021-07-07 DIAGNOSIS — N61.1 LEFT BREAST ABSCESS: ICD-10-CM

## 2021-07-07 DIAGNOSIS — F32.A DEPRESSION, UNSPECIFIED DEPRESSION TYPE: ICD-10-CM

## 2021-07-07 DIAGNOSIS — Z12.11 SCREEN FOR COLON CANCER: ICD-10-CM

## 2021-07-07 DIAGNOSIS — E03.9 ACQUIRED HYPOTHYROIDISM: ICD-10-CM

## 2021-07-07 DIAGNOSIS — E11.9 TYPE 2 DIABETES MELLITUS WITHOUT COMPLICATION, WITHOUT LONG-TERM CURRENT USE OF INSULIN (HCC): Primary | ICD-10-CM

## 2021-07-07 PROCEDURE — 99214 OFFICE O/P EST MOD 30 MIN: CPT | Performed by: FAMILY MEDICINE

## 2021-07-07 RX ORDER — LEVOTHYROXINE SODIUM 100 UG/1
TABLET ORAL
Qty: 90 TABLET | Refills: 1 | Status: SHIPPED | OUTPATIENT
Start: 2021-07-07 | End: 2021-07-14

## 2021-07-07 NOTE — ASSESSMENT & PLAN NOTE
monitored by specialist. No acute findings meriting change in the plan [address] [facility] [place] therapy completed Feb 2021.

## 2021-07-07 NOTE — PROGRESS NOTES
Identified pt with two pt identifiers(name and ). Chief Complaint   Patient presents with    Thyroid Problem     restarted thyroid medication 1 month ago and is here for follow up    Diabetes     recently started on insulin injections and is here for follow up - has a few questions    Labs     had had coffee with coffee mate in it this morning - \"I use CBD gummies at night and for H/A I use CBD liquid prn in case it shows up in my bloodwork\"        Health Maintenance Due   Topic    Pneumococcal 0-64 years (1 of 2 - PPSV23)    Eye Exam Retinal or Dilated     DTaP/Tdap/Td series (1 - Tdap)    PAP AKA CERVICAL CYTOLOGY     Shingrix Vaccine Age 50> (1 of 2)    Breast Cancer Screen Mammogram     Foot Exam Q1     MICROALBUMIN Q1     Colorectal Cancer Screening Combo        Wt Readings from Last 3 Encounters:   21 173 lb (78.5 kg)   21 175 lb 4.3 oz (79.5 kg)   21 178 lb 9.2 oz (81 kg)     Temp Readings from Last 3 Encounters:   21 97.8 °F (36.6 °C) (Temporal)   21 97.1 °F (36.2 °C)   21 97.9 °F (36.6 °C)     BP Readings from Last 3 Encounters:   21 (!) 148/79   21 (!) 156/81   21 (!) 162/83     Pulse Readings from Last 3 Encounters:   21 74   21 71   21 83         Learning Assessment:  :     Learning Assessment 2021 10/24/2019   PRIMARY LEARNER Patient Patient   BARRIERS PRIMARY LEARNER NONE -   CO-LEARNER CAREGIVER No -   PRIMARY LANGUAGE ENGLISH ENGLISH   LEARNER PREFERENCE PRIMARY LISTENING DEMONSTRATION     DEMONSTRATION -   ANSWERED BY patient self   RELATIONSHIP SELF SELF       Depression Screening:  :     3 most recent PHQ Screens 2021   PHQ Not Done -   Little interest or pleasure in doing things Not at all   Feeling down, depressed, irritable, or hopeless Not at all   Total Score PHQ 2 0       Fall Risk Assessment:  :     No flowsheet data found.     Abuse Screening:  :     Abuse Screening Questionnaire 2021 2/25/2021 1/24/2020 6/14/2019 10/29/2018   Do you ever feel afraid of your partner? N N N N N   Are you in a relationship with someone who physically or mentally threatens you? N N N N N   Is it safe for you to go home? Y Y Y Y Y       Coordination of Care Questionnaire:  :     1) Have you been to an emergency room, urgent care clinic since your last visit? no   Hospitalized since your last visit? no             2) Have you seen or consulted any other health care providers outside of [address] [place] [address] since your last visit? no  (Include any pap smears or colon screenings in this section.)    3) Do you have an Advance Directive on file? no  Are you interested in receiving information about Advance Directives? no    Patient is accompanied by self. Reviewed record in preparation for visit and have obtained necessary documentation. Medication reconciliation up to date and corrected with patient at this time.

## 2021-07-08 NOTE — PROGRESS NOTES
Subjective:     Caleb Pickens is a 59 y.o. female seen for follow up of diabetes. She also has hypertension. Diabetic Review of Systems - medication compliance: noncompliant some of the time, diabetic diet compliance: compliant most of the time, home glucose monitoring: is performed sporadically. Other symptoms and concerns: she has been back on thyroid med only about a month. She has noted better sugar readings since starting on insulin. Pt was seen in ER with L breast abscess. Delayed surgery due to high BS. Patient Active Problem List    Diagnosis Date Noted    Type 2 diabetes mellitus without complication, without long-term current use of insulin ([facility] 10/24/2019    Hypothyroidism     Depression     Migraine headache     Fluid retention      Current Outpatient Medications   Medication Sig Dispense Refill    levothyroxine (SYNTHROID) 100 mcg tablet TAKE 1 TABLET BY MOUTH ONCE DAILY BEFORE BREAKFAST 90 Tablet 1    hydroCHLOROthiazide (HYDRODIURIL) 25 mg tablet Take 1 Tab by mouth daily. 90 Tab 1    insulin glargine (LANTUS,BASAGLAR) 100 unit/mL (3 mL) inpn 16 Units by SubCUTAneous route nightly. 15 mL 0    Insulin Needles, Disposable, 31 gauge x 5/16\" ndle Use daily with insulin pen. 1 Package 11    OTHER Take 1 Tab by mouth daily as needed. Probiotic  20 billion      OTHER Take 2 Tabs by mouth daily. Tumeric curcumin 1000mg      chromium picolinate 1,000 mcg tablet Take 1 Tab by mouth daily.  prasterone, dhea, (DHEA) 50 mg tab Take 1 Tab by mouth daily.  calcium citrate-vitamin D3 (Citracal + D) tablet Take 2 Tabs by mouth two (2) times a day.  psyllium husk, with sugar, 2 gram wafr Take 1 Package by mouth daily as needed.  melatonin 5 mg cap capsule Take 5 mg by mouth nightly.  OTHER Take 4 Tabs by mouth daily. glucogil      elderberry fruit (ELDERBERRY PO) Take 2 Tabs by mouth daily.  coenzyme q10 (Co Q-10) 10 mg cap Take  by mouth.       IODINE Take 1 Tab by mouth daily. Tripe Iodine  Complex 12.5      VALERIAN ROOT by Does Not Apply route nightly.  prenatal vit/iron fum/folic ac (PRENATAL-FOLIC ACID PO) Take 1 Tab by mouth daily.  ferrous fumarate/vit Bcomp,C (SUPER B COMPLEX PO) Take 1 Tab by mouth daily.  biotin 10,000 mcg cap Take 1 Cap by mouth two (2) times a day.  potassium 99 mg tablet Take 2 Tabs by mouth daily.  lysine (L-LYSINE) 500 mg tab tablet Take 1 Tab by mouth daily.  cholecalciferol (VITAMIN D3) 1,000 unit cap Take 1,000 Units by mouth daily.  glucosamine-chondroitin (ARTHX) 500-400 mg cap Take 1 Cap by mouth two (2) times a day. Allergies   Allergen Reactions    Metformin Diarrhea     Past Medical History:   Diagnosis Date    Cyst (solitary) of breast, left 2021    epidermal inclusion    Depression     Diabetes ([address]     Fluid retention     Hypothyroidism     Migraine headache      Past Surgical History:   Procedure Laterality Date    HX  SECTION      in  and     HX CHOLECYSTECTOMY       Family History   Problem Relation Age of Onset    No Known Problems Mother     Heart Failure Father      Social History     Tobacco Use    Smoking status: Never Smoker    Smokeless tobacco: Never Used   Substance Use Topics    Alcohol use: Yes     Comment: very seldom             Review of Systems  Pertinent items are noted in HPI. Objective:     Visit Vitals  /74 (BP 1 Location: Left upper arm)   Pulse 74   Temp 97.8 °F (36.6 °C) (Temporal)   Resp 16   Ht 5' 3\" (1.6 m)   Wt 173 lb (78.5 kg)   SpO2 99%   BMI 30.65 kg/m²     Appearance: alert, well appearing, and in no distress. Exam: heart sounds normal rate, regular rhythm, normal S1, S2, no murmurs, rubs, clicks or gallops, chest clear  Lab review: orders written for new lab studies as appropriate; see orders. Assessment/Plan:     diabetes poorly controlled, needs improvement, hypertension stable, hypothyroidism.   Diabetic issues reviewed with her: annual eye examinations at Ophthalmology discussed, glycohemoglobin and other lab monitoring discussed and long term diabetic complications discussed. ICD-10-CM ICD-9-CM    1. Type 2 diabetes mellitus without complication, without long-term current use of insulin (HCC)  E11.9 250.00 LIPID PANEL      METABOLIC PANEL, COMPREHENSIVE      HEMOGLOBIN A1C WITH EAG      MICROALBUMIN, UR, RAND W/ MICROALB/CREAT RATIO      LIPID PANEL      METABOLIC PANEL, COMPREHENSIVE      HEMOGLOBIN A1C WITH EAG      MICROALBUMIN, UR, RAND W/ MICROALB/CREAT RATIO   2. Acquired hypothyroidism  E03.9 244.9 TSH 3RD GENERATION      T4, FREE      levothyroxine (SYNTHROID) 100 mcg tablet      TSH 3RD GENERATION      T4, FREE   3. Left breast abscess  N61.1 611.0    4. Depression, unspecified depression type  F32.9 311    5. Screen for colon cancer  Z12.11 V76.51 OCCULT BLOOD IMMUNOASSAY,DIAGNOSTIC      OCCULT BLOOD IMMUNOASSAY,DIAGNOSTIC     Order labs. Pt given stool FIT kit. She will need a letter to be sent to surgeon Dr Jairon Goodman when cleared for breast surgery.

## 2021-07-09 LAB
ALBUMIN SERPL-MCNC: 4.3 G/DL (ref 3.8–4.8)
ALBUMIN/CREAT UR: 4 MG/G CREAT (ref 0–29)
ALBUMIN/GLOB SERPL: 1.8 {RATIO} (ref 1.2–2.2)
ALP SERPL-CCNC: 115 IU/L (ref 48–121)
ALT SERPL-CCNC: 11 IU/L (ref 0–32)
AST SERPL-CCNC: 20 IU/L (ref 0–40)
BILIRUB SERPL-MCNC: 0.4 MG/DL (ref 0–1.2)
BUN SERPL-MCNC: 12 MG/DL (ref 8–27)
BUN/CREAT SERPL: 16 (ref 12–28)
CALCIUM SERPL-MCNC: 10.1 MG/DL (ref 8.7–10.3)
CHLORIDE SERPL-SCNC: 101 MMOL/L (ref 96–106)
CHOLEST SERPL-MCNC: 167 MG/DL (ref 100–199)
CO2 SERPL-SCNC: 26 MMOL/L (ref 20–29)
CREAT SERPL-MCNC: 0.73 MG/DL (ref 0.57–1)
CREAT UR-MCNC: 105.7 MG/DL
EST. AVERAGE GLUCOSE BLD GHB EST-MCNC: 258 MG/DL
GLOBULIN SER CALC-MCNC: 2.4 G/DL (ref 1.5–4.5)
GLUCOSE SERPL-MCNC: 205 MG/DL (ref 65–99)
HBA1C MFR BLD: 10.6 % (ref 4.8–5.6)
HDLC SERPL-MCNC: 44 MG/DL
IMP & REVIEW OF LAB RESULTS: NORMAL
LDLC SERPL CALC-MCNC: 101 MG/DL (ref 0–99)
MICROALBUMIN UR-MCNC: 4.4 UG/ML
POTASSIUM SERPL-SCNC: 4.7 MMOL/L (ref 3.5–5.2)
PROT SERPL-MCNC: 6.7 G/DL (ref 6–8.5)
SODIUM SERPL-SCNC: 141 MMOL/L (ref 134–144)
T4 FREE SERPL-MCNC: 1.96 NG/DL (ref 0.82–1.77)
TRIGL SERPL-MCNC: 124 MG/DL (ref 0–149)
TSH SERPL DL<=0.005 MIU/L-ACNC: 2.37 UIU/ML (ref 0.45–4.5)
VLDLC SERPL CALC-MCNC: 22 MG/DL (ref 5–40)

## 2021-07-14 ENCOUNTER — TELEPHONE (OUTPATIENT)
Dept: FAMILY MEDICINE CLINIC | Age: 61
End: 2021-07-14

## 2021-07-14 DIAGNOSIS — E03.9 ACQUIRED HYPOTHYROIDISM: ICD-10-CM

## 2021-07-14 DIAGNOSIS — E11.9 TYPE 2 DIABETES MELLITUS WITHOUT COMPLICATION, WITHOUT LONG-TERM CURRENT USE OF INSULIN (HCC): ICD-10-CM

## 2021-07-14 RX ORDER — LEVOTHYROXINE SODIUM 100 UG/1
100 TABLET ORAL
Qty: 90 TABLET | Refills: 1 | Status: SHIPPED | OUTPATIENT
Start: 2021-07-14 | End: 2022-04-01 | Stop reason: SDUPTHER

## 2021-07-14 RX ORDER — INSULIN GLARGINE 100 [IU]/ML
20 INJECTION, SOLUTION SUBCUTANEOUS
Qty: 15 ML | Refills: 0 | Status: SHIPPED | OUTPATIENT
Start: 2021-07-14 | End: 2021-10-04

## 2021-07-14 NOTE — PROGRESS NOTES
Call pt: sugar is much better. I think she can get breast abscess removed now. A1C is still very high but this may take another 3 months to come down. Increase Lantus to 20 units at night. Thyroid level is too high. Suggest she skip thyroid tablet one day a week (take only 6 days a week). Good cholesterol. Follow up in 3 months.

## 2021-07-20 LAB — HEMOCCULT STL QL IA: NEGATIVE

## 2021-09-14 ENCOUNTER — TELEPHONE (OUTPATIENT)
Dept: FAMILY MEDICINE CLINIC | Age: 61
End: 2021-09-14

## 2021-09-14 ENCOUNTER — OFFICE VISIT (OUTPATIENT)
Dept: FAMILY MEDICINE CLINIC | Age: 61
End: 2021-09-14
Payer: MEDICAID

## 2021-09-14 VITALS
OXYGEN SATURATION: 96 % | TEMPERATURE: 97.8 F | HEIGHT: 63 IN | WEIGHT: 174 LBS | SYSTOLIC BLOOD PRESSURE: 136 MMHG | BODY MASS INDEX: 30.83 KG/M2 | RESPIRATION RATE: 18 BRPM | DIASTOLIC BLOOD PRESSURE: 80 MMHG | HEART RATE: 82 BPM

## 2021-09-14 DIAGNOSIS — N95.1 MENOPAUSAL SYMPTOMS: Primary | ICD-10-CM

## 2021-09-14 DIAGNOSIS — Z13.31 POSITIVE DEPRESSION SCREENING: ICD-10-CM

## 2021-09-14 DIAGNOSIS — F41.9 ANXIETY AND DEPRESSION: ICD-10-CM

## 2021-09-14 DIAGNOSIS — F32.A ANXIETY AND DEPRESSION: ICD-10-CM

## 2021-09-14 DIAGNOSIS — M25.512 CHRONIC LEFT SHOULDER PAIN: ICD-10-CM

## 2021-09-14 DIAGNOSIS — R61 NIGHT SWEATS: ICD-10-CM

## 2021-09-14 DIAGNOSIS — G89.29 CHRONIC LEFT SHOULDER PAIN: ICD-10-CM

## 2021-09-14 PROCEDURE — 99213 OFFICE O/P EST LOW 20 MIN: CPT | Performed by: NURSE PRACTITIONER

## 2021-09-14 NOTE — PROGRESS NOTES
HISTORY OF PRESENT ILLNESS  Jose Luis Craven is a 59 y.o. female. HPI   Pt presents with \"needing labs and shoulder pain\"  Pt states that she needs labs from [facility] for her hormone replacement therapy. Pt has a very long list of labs needed. Informed patient that I can not guarantee that her insurance will cover all of these labs, and she would be responsible for bill. Pt verbalizes understanding of this. In addition, she would like a HgbA1C run today. In addition, she states that she has noted left shoulder pain for a couple of months  She states that there is dull pain in the shoulder all the time, and then pain is increased with certain movements  She has no known injury to the shoulder  No swelling in the shoulder  She notes increased pain with reaching backwards  Review of Systems   Constitutional: Negative for fever. Musculoskeletal: Positive for joint pain. Physical Exam  Constitutional:       Appearance: Normal appearance. Cardiovascular:      Rate and Rhythm: Normal rate and regular rhythm. Heart sounds: Normal heart sounds. Pulmonary:      Effort: Pulmonary effort is normal.      Breath sounds: Normal breath sounds. Neurological:      Mental Status: She is alert. Psychiatric:         Mood and Affect: Mood normal.         Behavior: Behavior normal.         ASSESSMENT and PLAN    ICD-10-CM ICD-9-CM    1.  Menopausal symptoms  N95.1 627.2 HEMOGLOBIN A1C WITH EAG      VITAMIN D, 25 HYDROXY      GLUCOSE, RANDOM      INSULIN-LIKE GROWTH FACTOR 1      INTERLEUKIN 6      CRP, HIGH SENSITIVITY      LIPID PANEL      HOMOCYSTEINE, PLASMA      PROGESTERONE      ESTROGENS, TOTAL      TESTOSTERONE, TOTAL, FEMALE/CHILD      DHEA SULFATE      SEX HORMONE BINDING GLOBULIN      TSH 3RD GENERATION      T3, REVERSE      T4, FREE      THYROID ANTIBODY PANEL      FERRITIN      HEMOGLOBIN A1C WITH EAG      VITAMIN D, 25 HYDROXY      GLUCOSE, RANDOM      INSULIN-LIKE GROWTH FACTOR 1      INTERLEUKIN 6      CRP, HIGH SENSITIVITY      LIPID PANEL      HOMOCYSTEINE, PLASMA      PROGESTERONE      ESTROGENS, TOTAL      TESTOSTERONE, TOTAL, FEMALE/CHILD      DHEA SULFATE      SEX HORMONE BINDING GLOBULIN      TSH 3RD GENERATION      T3, REVERSE      T4, FREE      THYROID ANTIBODY PANEL      FERRITIN      REFERRAL TO ENDOCRINOLOGY   2. Night sweats  R61 780.8 HEMOGLOBIN A1C WITH EAG      VITAMIN D, 25 HYDROXY      GLUCOSE, RANDOM      INSULIN-LIKE GROWTH FACTOR 1      INTERLEUKIN 6      CRP, HIGH SENSITIVITY      LIPID PANEL      HOMOCYSTEINE, PLASMA      PROGESTERONE      ESTROGENS, TOTAL      TESTOSTERONE, TOTAL, FEMALE/CHILD      DHEA SULFATE      SEX HORMONE BINDING GLOBULIN      TSH 3RD GENERATION      T3, REVERSE      T4, FREE      THYROID ANTIBODY PANEL      FERRITIN      HEMOGLOBIN A1C WITH EAG      VITAMIN D, 25 HYDROXY      GLUCOSE, RANDOM      INSULIN-LIKE GROWTH FACTOR 1      INTERLEUKIN 6      CRP, HIGH SENSITIVITY      LIPID PANEL      HOMOCYSTEINE, PLASMA      PROGESTERONE      ESTROGENS, TOTAL      TESTOSTERONE, TOTAL, FEMALE/CHILD      DHEA SULFATE      SEX HORMONE BINDING GLOBULIN      TSH 3RD GENERATION      T3, REVERSE      T4, FREE      THYROID ANTIBODY PANEL      FERRITIN   3.  Anxiety and depression  F41.9 300.00 HEMOGLOBIN A1C WITH EAG    F32.9 311 VITAMIN D, 25 HYDROXY      GLUCOSE, RANDOM      INSULIN-LIKE GROWTH FACTOR 1      INTERLEUKIN 6      CRP, HIGH SENSITIVITY      LIPID PANEL      HOMOCYSTEINE, PLASMA      PROGESTERONE      ESTROGENS, TOTAL      TESTOSTERONE, TOTAL, FEMALE/CHILD      DHEA SULFATE      SEX HORMONE BINDING GLOBULIN      TSH 3RD GENERATION      T3, REVERSE      T4, FREE      THYROID ANTIBODY PANEL      FERRITIN      HEMOGLOBIN A1C WITH EAG      VITAMIN D, 25 HYDROXY      GLUCOSE, RANDOM      INSULIN-LIKE GROWTH FACTOR 1      INTERLEUKIN 6      CRP, HIGH SENSITIVITY      LIPID PANEL      HOMOCYSTEINE, PLASMA      PROGESTERONE      ESTROGENS, TOTAL TESTOSTERONE, TOTAL, FEMALE/CHILD      DHEA SULFATE      SEX HORMONE BINDING GLOBULIN      TSH 3RD GENERATION      T3, REVERSE      T4, FREE      THYROID ANTIBODY PANEL      FERRITIN   4. Positive depression screening  Z13.31 796.4    5. Chronic left shoulder pain  M25.512 719.41 XR SHOULDER LT AP/LAT MIN 2 V    G89.29 338.29      Will notify when labs return    Will notify when x-ray returns    Pt informed to return to office with worsening of symptoms, or PRN with any questions or concerns. Pt verbalizes understanding of plan of care and denies further questions or concerns at this time.

## 2021-09-14 NOTE — PROGRESS NOTES
Identified pt with two pt identifiers(name and ).     Chief Complaint   Patient presents with    Labs    Diabetes    Depression        Health Maintenance Due   Topic    Eye Exam Retinal or Dilated     COVID-19 Vaccine (1)    DTaP/Tdap/Td series (1 - Tdap)    Cervical cancer screen     Shingrix Vaccine Age 50> (1 of 2)    Flu Vaccine (1)       Wt Readings from Last 3 Encounters:   21 174 lb (78.9 kg)   21 173 lb (78.5 kg)   21 175 lb 4.3 oz (79.5 kg)     Temp Readings from Last 3 Encounters:   21 97.8 °F (36.6 °C) (Temporal)   21 97.8 °F (36.6 °C) (Temporal)   21 97.1 °F (36.2 °C)     BP Readings from Last 3 Encounters:   21 136/80   21 130/74   21 (!) 156/81     Pulse Readings from Last 3 Encounters:   21 82   21 74   21 71         Learning Assessment:  :     Learning Assessment 2021 10/24/2019   PRIMARY LEARNER Patient Patient   BARRIERS PRIMARY LEARNER NONE -   CO-LEARNER CAREGIVER No -   PRIMARY LANGUAGE ENGLISH ENGLISH   LEARNER PREFERENCE PRIMARY LISTENING DEMONSTRATION     DEMONSTRATION -   ANSWERED BY patient self   RELATIONSHIP SELF SELF       Depression Screening:  :     3 most recent PHQ Screens 2021   PHQ Not Done -   Little interest or pleasure in doing things More than half the days   Feeling down, depressed, irritable, or hopeless More than half the days   Total Score PHQ 2 4   Trouble falling or staying asleep, or sleeping too much Nearly every day   Feeling tired or having little energy Nearly every day   Poor appetite, weight loss, or overeating Not at all   Feeling bad about yourself - or that you are a failure or have let yourself or your family down More than half the days   Trouble concentrating on things such as school, work, reading, or watching TV More than half the days   Moving or speaking so slowly that other people could have noticed; or the opposite being so fidgety that others notice Not at all Thoughts of being better off dead, or hurting yourself in some way Not at all   PHQ 9 Score 14   How difficult have these problems made it for you to do your work, take care of your home and get along with others Somewhat difficult       Fall Risk Assessment:  :     No flowsheet data found. Abuse Screening:  :     Abuse Screening Questionnaire 9/14/2021 7/7/2021 2/25/2021 1/24/2020 6/14/2019 10/29/2018   Do you ever feel afraid of your partner? N N N N N N   Are you in a relationship with someone who physically or mentally threatens you? N N N N N N   Is it safe for you to go home? Y Y Y Y Y Y       Coordination of Care Questionnaire:  :     1) Have you been to an emergency room, urgent care clinic since your last visit? no   Hospitalized since your last visit? no             2) Have you seen or consulted any other health care providers outside of [address] [place] [address] since your last visit? no  (Include any pap smears or colon screenings in this section.)    3) Do you have an Advance Directive on file? no  Are you interested in receiving information about Advance Directives?  no  Reviewed record in preparation for visit and have obtained necessary documentation

## 2021-09-14 NOTE — TELEPHONE ENCOUNTER
----- Message from Tarik Cordova sent at 9/14/2021 12:28 PM EDT -----  Regarding: JOZEF Peters/telephone  Contact: [phone]  General Message/Vendor Calls    Caller's first and last name: N/A      Reason for call: She needs to know if she needs to be fasting for her adrenal labs.        Callback required yes/no and why: Yes       Best contact number(s):[phone]       Details to clarify the request: N/A      Tarik Cordova

## 2021-09-19 LAB
25(OH)D3+25(OH)D2 SERPL-MCNC: 67.7 NG/ML (ref 30–100)
CHOLEST SERPL-MCNC: 176 MG/DL (ref 100–199)
CRP SERPL HS-MCNC: 2.33 MG/L (ref 0–3)
DHEA-S SERPL-MCNC: 484 UG/DL (ref 29.4–220.5)
EST. AVERAGE GLUCOSE BLD GHB EST-MCNC: 163 MG/DL
ESTROGEN SERPL-MCNC: 90 PG/ML
FERRITIN SERPL-MCNC: 212 NG/ML (ref 15–150)
GLUCOSE SERPL-MCNC: 290 MG/DL (ref 65–99)
HBA1C MFR BLD: 7.3 % (ref 4.8–5.6)
HCYS SERPL-SCNC: 5.8 UMOL/L (ref 0–17.2)
HDLC SERPL-MCNC: 47 MG/DL
IGF-I SERPL-MCNC: 186 NG/ML (ref 57–202)
IL6 SERPL-MCNC: <2.5 PG/ML (ref 0–13)
IMP & REVIEW OF LAB RESULTS: NORMAL
LDLC SERPL CALC-MCNC: 108 MG/DL (ref 0–99)
PROGEST SERPL-MCNC: 0.1 NG/ML
SHBG SERPL-SCNC: 51.9 NMOL/L (ref 17.3–125)
T3REVERSE SERPL-MCNC: 23.1 NG/DL (ref 9.2–24.1)
T4 FREE SERPL-MCNC: 1.68 NG/DL (ref 0.82–1.77)
TESTOST SERPL-MCNC: 28.2 NG/DL (ref 7–40)
THYROGLOB AB SERPL-ACNC: <1 IU/ML (ref 0–0.9)
THYROPEROXIDASE AB SERPL-ACNC: 46 IU/ML (ref 0–34)
TRIGL SERPL-MCNC: 114 MG/DL (ref 0–149)
TSH SERPL DL<=0.005 MIU/L-ACNC: 2.15 UIU/ML (ref 0.45–4.5)
VLDLC SERPL CALC-MCNC: 21 MG/DL (ref 5–40)

## 2021-09-20 ENCOUNTER — TELEPHONE (OUTPATIENT)
Dept: FAMILY MEDICINE CLINIC | Age: 61
End: 2021-09-20

## 2021-09-20 NOTE — PROGRESS NOTES
Please call patient and let her know that her labs for compounding pharmacy have returned. She may want these faxed to them?   Thanks

## 2021-09-23 ENCOUNTER — TELEPHONE (OUTPATIENT)
Dept: FAMILY MEDICINE CLINIC | Age: 61
End: 2021-09-23

## 2021-09-23 ENCOUNTER — HOSPITAL ENCOUNTER (OUTPATIENT)
Dept: GENERAL RADIOLOGY | Age: 61
Discharge: HOME | End: 2021-09-23
Attending: NURSE PRACTITIONER
Payer: MEDICAID

## 2021-09-23 DIAGNOSIS — G89.29 CHRONIC LEFT SHOULDER PAIN: ICD-10-CM

## 2021-09-23 DIAGNOSIS — M25.512 CHRONIC LEFT SHOULDER PAIN: ICD-10-CM

## 2021-09-23 PROCEDURE — 73030 X-RAY EXAM OF SHOULDER: CPT

## 2021-09-23 NOTE — TELEPHONE ENCOUNTER
----- Message from Ann Feldman NP sent at 9/23/2021  9:32 AM EDT -----  Please call patient and let her know that her x-ray returned showing mild arthritis.   Happy to refer to PT if she is interested  Thanks

## 2021-09-23 NOTE — PROGRESS NOTES
Please call patient and let her know that her x-ray returned showing mild arthritis.   Happy to refer to PT if she is interested  Thanks

## 2021-10-01 ENCOUNTER — TELEPHONE (OUTPATIENT)
Dept: FAMILY MEDICINE CLINIC | Age: 61
End: 2021-10-01

## 2021-10-01 NOTE — TELEPHONE ENCOUNTER
----- Message from Jonh Woods sent at 10/1/2021  9:33 AM EDT -----  Regarding: Dr. David Blanco: [phone]  General Message/Vendor Calls    Caller's first and last name: Luis Morocho (patient)      Reason for call: would like to know if Dr. Belinda Chaudhari has results of X-ray of shoulder and results of adrenal test from [facility] required yes/no and why: yes       Best contact number(s): [phone]      Details to clarify the request: n/a       Jonh Woods

## 2021-10-04 DIAGNOSIS — E11.9 TYPE 2 DIABETES MELLITUS WITHOUT COMPLICATION, WITHOUT LONG-TERM CURRENT USE OF INSULIN (HCC): ICD-10-CM

## 2021-10-04 RX ORDER — INSULIN GLARGINE 100 [IU]/ML
INJECTION, SOLUTION SUBCUTANEOUS
Qty: 15 ML | Refills: 3 | Status: SHIPPED | OUTPATIENT
Start: 2021-10-04 | End: 2022-06-21

## 2021-10-04 NOTE — TELEPHONE ENCOUNTER
----- Message from Gibran Torres sent at 10/4/2021 12:45 PM EDT -----  Regarding: Dr. Cancino Pages: [phone]  General Message/Vendor Calls    Caller's first and last name: Joana Comment (pt)      Reason for call: Status of Adrenal Results from [facility] required yes/no and why: yes       Best contact number(s): [phone]      Details to clarify the request: n/a       Gibran Torres

## 2021-10-05 ENCOUNTER — TELEPHONE (OUTPATIENT)
Dept: FAMILY MEDICINE CLINIC | Age: 61
End: 2021-10-05

## 2021-10-05 DIAGNOSIS — M25.512 ACUTE PAIN OF LEFT SHOULDER: Primary | ICD-10-CM

## 2021-10-05 NOTE — TELEPHONE ENCOUNTER
All labs returned, but as discussed with patient, they were ordered for her compounding medications and would be interpreted by them!   Thanks

## 2021-10-05 NOTE — TELEPHONE ENCOUNTER
Pt requesting referral to [facility] for left shoulder pain. Order placed. Will you please fax for me? Thanks!

## 2021-10-05 NOTE — TELEPHONE ENCOUNTER
Called pt and advised her that we have not received her adrenal lab results and that she will need to call [facility] to obtain them.

## 2021-10-07 ENCOUNTER — TELEPHONE (OUTPATIENT)
Dept: FAMILY MEDICINE CLINIC | Age: 61
End: 2021-10-07

## 2021-10-07 NOTE — TELEPHONE ENCOUNTER
Called pt and left her a vm advising her that [facility] our  called [facility] and all they had was Cortisol results. I told her I would leave them up front for her to .

## 2021-10-07 NOTE — TELEPHONE ENCOUNTER
Patient would like a call back with her adrenal labs. She said she has called [facility] twice and can not get a call back. Not sure if Kin Hernandez can pull the results.  S)[phone]

## 2021-10-25 ENCOUNTER — TELEPHONE (OUTPATIENT)
Dept: FAMILY MEDICINE CLINIC | Age: 61
End: 2021-10-25

## 2021-10-25 NOTE — TELEPHONE ENCOUNTER
----- Message from Ramana Castellano sent at 10/25/2021  9:59 AM EDT -----  Subject: Message to Provider    QUESTIONS  Information for Provider? Pt would like approval sent to [facility] p. [phone] to begin hormone therapy (plant   based)   ---------------------------------------------------------------------------  --------------  [address]  What is the best way for the office to contact you? OK to leave message on   voicemail  Preferred Call Back Phone Number? [phone]  ---------------------------------------------------------------------------  --------------  SCRIPT ANSWERS  Relationship to Patient?  Self

## 2021-10-26 NOTE — TELEPHONE ENCOUNTER
Pt had labs done in Sept by Yovanny Rodarte for hormone levels. She has forwarded those to [facility]. They want to get approval for compounded bio-identical  HRT based on lab results. I asked pt to have [facility] fax us the order for what they recommend for HRT and I will sign it.

## 2021-10-29 ENCOUNTER — TELEPHONE (OUTPATIENT)
Dept: FAMILY MEDICINE CLINIC | Age: 61
End: 2021-10-29

## 2021-10-29 NOTE — TELEPHONE ENCOUNTER
Pt is wanting update on hormone therapy script that was supposed to have been sent over to [facility]. Stated it has been faxed a few times to the office and not returned.   Pt going out of town in a few days and would like it asap    Call pt at [phone] with update

## 2021-12-27 ENCOUNTER — OFFICE VISIT (OUTPATIENT)
Dept: FAMILY MEDICINE CLINIC | Age: 61
End: 2021-12-27
Payer: MEDICAID

## 2021-12-27 VITALS
SYSTOLIC BLOOD PRESSURE: 128 MMHG | HEART RATE: 74 BPM | HEIGHT: 63 IN | DIASTOLIC BLOOD PRESSURE: 72 MMHG | BODY MASS INDEX: 32.25 KG/M2 | TEMPERATURE: 97.4 F | WEIGHT: 182 LBS | OXYGEN SATURATION: 97 % | RESPIRATION RATE: 20 BRPM

## 2021-12-27 DIAGNOSIS — F32.A DEPRESSION, UNSPECIFIED DEPRESSION TYPE: Primary | ICD-10-CM

## 2021-12-27 PROCEDURE — 99213 OFFICE O/P EST LOW 20 MIN: CPT | Performed by: FAMILY MEDICINE

## 2021-12-27 RX ORDER — ESCITALOPRAM OXALATE 20 MG/1
20 TABLET ORAL DAILY
Qty: 90 TABLET | Refills: 1 | Status: SHIPPED | OUTPATIENT
Start: 2021-12-27 | End: 2022-07-10

## 2021-12-27 NOTE — PATIENT INSTRUCTIONS
Diabetes and Preventing Falls: Care Instructions  Overview     Complications of diabetes--such as nerve damage, foot problems, and reduced vision--may increase your risk of a fall. Some of your medicines also may add to your risk. By making your home safer, you can lower your risk of falling. Doing things to prevent diabetes complications may also help to lower your risk. You can make your home safer with a few simple measures. Follow-up care is a key part of your treatment and safety. Be sure to make and go to all appointments, and call your doctor if you are having problems. It's also a good idea to know your test results and keep a list of the medicines you take. How can you care for yourself at home? Taking care of yourself  · Keep your blood sugar at a target level (which you set with your doctor). · Exercise regularly to improve your strength, muscle tone, and balance. Walk if you can. Swimming may be a good choice if you cannot walk easily. · Have your vision checked as often as your doctor recommends. It is usually once a year or more often if you have eye problems. · Know the side effects of the medicines you take. Ask your doctor or pharmacist whether the medicines you take can affect your balance. Sleeping pills or sedatives can affect your balance. · Limit the amount of alcohol you drink. Alcohol can impair your balance and other senses. · Have your doctor check your feet during each visit. If you have a foot problem, see your doctor. Preventing falls at home  · Remove raised doorway thresholds, throw rugs, and clutter. Repair loose carpet or raised areas in the floor. · Move furniture and electrical cords to keep them out of walking paths. · Use nonskid floor wax, and wipe up spills right away, especially on ceramic tile floors. · If you use a walker or cane, put rubber tips on it.  If you use crutches, clean the bottoms of them regularly with an abrasive pad, such as steel wool.  · Keep your house well lit, especially [place], and outside walkways. Use night-lights in areas such as hallways and bathrooms. Add extra light switches or use remote switches (such as switches that go on or off when you clap your hands) to make it easier to turn lights on if you have to get up during the night. · Install sturdy handrails on stairways. Put grab bars near your shower, bathtub, and toilet. · Store household items on low shelves so that you do not have to climb or reach high. Or use a reaching device that you can get at a medical supply store. If you have to climb for something, use a step stool with handrails, or ask someone to get it for you. · Keep a cordless phone and a flashlight with new batteries by your bed. If possible, put a phone in each of the main rooms of your house, or carry a cell phone in case you fall and cannot reach a phone. Or you can wear a device around your neck or wrist. You push a button that sends a signal for help. · Wear low-heeled shoes that fit well and give your feet good support. Use footwear with nonskid soles. Check the heels and soles of your shoes for wear. Repair or replace worn heels or soles. · Do not wear socks without shoes on wood floors. · Walk on the grass when the sidewalks are slippery. If you live in an area that gets snow and ice in the winter, sprinkle salt on slippery steps and sidewalks. Where can you learn more? Go to http://www.gray.com/  Enter X601 in the search box to learn more about \"Diabetes and Preventing Falls: Care Instructions. \"  Current as of: December 7, 2020               Content Version: 13.0  © 9985-5054 [employer]. Care instructions adapted under license by [employer] (which disclaims liability or warranty for this information).  If you have questions about a medical condition or this instruction, always ask your healthcare professional. [employer] disclaims any warranty or liability for your use of this information.
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
## 2022-01-25 ENCOUNTER — TELEPHONE (OUTPATIENT)
Dept: FAMILY MEDICINE CLINIC | Age: 62
End: 2022-01-25

## 2022-01-25 NOTE — TELEPHONE ENCOUNTER
----- Message from Eboni Zamudio sent at 1/25/2022 12:53 PM EST -----  Subject: Message to Provider    QUESTIONS  Information for Provider? Having a hard time sleeping. Also wants to   update on how she is doing with antidepressant. Please call patient. Offered appt, but declined. ---------------------------------------------------------------------------  --------------  Elizabeth VIVAS  What is the best way for the office to contact you? OK to leave message on   voicemail  Preferred Call Back Phone Number? [phone]  ---------------------------------------------------------------------------  --------------  SCRIPT ANSWERS  Relationship to Patient?  Self

## 2022-03-20 PROBLEM — E11.9 TYPE 2 DIABETES MELLITUS WITHOUT COMPLICATION, WITHOUT LONG-TERM CURRENT USE OF INSULIN (HCC): Status: ACTIVE | Noted: 2019-10-24

## 2022-03-25 RX ORDER — HYDROCHLOROTHIAZIDE 25 MG/1
25 TABLET ORAL DAILY
Qty: 90 TABLET | Refills: 1 | Status: SHIPPED | OUTPATIENT
Start: 2022-03-25

## 2022-04-01 DIAGNOSIS — E03.9 ACQUIRED HYPOTHYROIDISM: ICD-10-CM

## 2022-04-01 RX ORDER — LEVOTHYROXINE SODIUM 100 UG/1
100 TABLET ORAL
Qty: 90 TABLET | Refills: 1 | Status: SHIPPED | OUTPATIENT
Start: 2022-04-01 | End: 2022-06-21 | Stop reason: DRUGHIGH

## 2022-04-21 ENCOUNTER — TELEPHONE (OUTPATIENT)
Dept: FAMILY MEDICINE CLINIC | Age: 62
End: 2022-04-21

## 2022-04-21 NOTE — TELEPHONE ENCOUNTER
Called patient. She tested positive along with other family members for Covid last night. She is having body ache, congestion, cough that burns, and a headache. She would like to know if the medications Ivermectin or Paxlovid would be options. Someone in [place] had Covid and they were given these medication per patient. Thank you.

## 2022-04-21 NOTE — TELEPHONE ENCOUNTER
----- Message from Kirsten Landers sent at 4/21/2022 12:32 PM EDT -----  Subject: Message to Provider    QUESTIONS  Information for Provider? Patient and her family all tested positive for   covid last night and would like to know what they should do, if something   can be sent in for them. Said they tested positive this morning and she   tested positive last night.   ---------------------------------------------------------------------------  --------------  CALL BACK INFO  What is the best way for the office to contact you? OK to leave message on   voicemail  Preferred Call Back Phone Number? [phone]  ---------------------------------------------------------------------------  --------------  SCRIPT ANSWERS  Relationship to Patient?  Self

## 2022-04-22 ENCOUNTER — TELEPHONE (OUTPATIENT)
Dept: FAMILY MEDICINE CLINIC | Age: 62
End: 2022-04-22

## 2022-04-22 ENCOUNTER — VIRTUAL VISIT (OUTPATIENT)
Dept: FAMILY MEDICINE CLINIC | Age: 62
End: 2022-04-22
Payer: MEDICAID

## 2022-04-22 DIAGNOSIS — U07.1 COVID-19: Primary | ICD-10-CM

## 2022-04-22 PROCEDURE — 99213 OFFICE O/P EST LOW 20 MIN: CPT | Performed by: FAMILY MEDICINE

## 2022-04-22 RX ORDER — NIRMATRELVIR AND RITONAVIR 300-100 MG
KIT ORAL
Qty: 1 BOX | Refills: 0 | Status: SHIPPED | OUTPATIENT
Start: 2022-04-22 | End: 2022-06-16 | Stop reason: ALTCHOICE

## 2022-04-22 NOTE — TELEPHONE ENCOUNTER
----- Message from [facility] sent at 4/22/2022 11:37 AM EDT -----  Subject: Message to Provider    QUESTIONS  Information for Provider? pt called in to state [facility] will not   do the infusion due to pt has had symptoms too long. Pt is requesting   alternative.   ---------------------------------------------------------------------------  --------------  CALL BACK INFO  What is the best way for the office to contact you? OK to leave message on   voicemail  Preferred Call Back Phone Number? [phone]  ---------------------------------------------------------------------------  --------------  SCRIPT ANSWERS  Relationship to Patient?  Self

## 2022-04-22 NOTE — PROGRESS NOTES
Abiel Hoffmann is a 57 y.o. female who was seen by synchronous (real-time) audio-video technology on 4/22/2022 for Positive For Covid-19        Assessment & Plan:   Diagnoses and all orders for this visit:    1. COVID-19  -     nirmatrelvir-ritonavir (Paxlovid, EUA,) 150 mg x 2- 100 mg tablet; As directed. Indications: COVID-19 (emergency use authorization)    discuss tx options for COVID infection. She is high risk group. Will try to refer to [facility] or Paxlovid. I spent at least 7 minutes on this visit with this established patient. 712  Subjective:       Prior to Admission medications    Medication Sig Start Date End Date Taking? Authorizing Provider   levothyroxine (SYNTHROID) 100 mcg tablet Take 1 Tablet by mouth six (6) days a week. Indications: a condition with low thyroid hormone levels 9/8/63   Cintia Cabral MD   hydroCHLOROthiazide (HYDRODIURIL) 25 mg tablet Take 1 Tablet by mouth daily. 0/05/48   Cintia Cabral MD   OTHER Bioidentical hormones topical cream from [facility]. Provider, Historical   escitalopram oxalate (LEXAPRO) 20 mg tablet Take 1 Tablet by mouth daily. Indications: major depressive disorder 87/99/96   Cintia Cabral MD   Lantus Solostar U-100 Insulin 100 unit/mL (3 mL) inpn INJECT 20 UNITS SUBCUTANEOUSLY NIGHTLY 47/3/97   Cintia Cabral MD   Insulin Needles, Disposable, 31 gauge x 5/16\" ndle Use daily with insulin pen. 5/14/21   Gregory Huddleston MD   OTHER Take 1 Tab by mouth daily as needed. Probiotic  20 billion    Provider, Historical   calcium citrate-vitamin D3 (Citracal + D) tablet Take 2 Tabs by mouth two (2) times a day. Provider, Historical   melatonin 5 mg cap capsule Take 5 mg by mouth nightly. Provider, Historical   elderberry fruit (ELDERBERRY PO) Take 2 Tabs by mouth daily. Provider, Historical   coenzyme q10 (Co Q-10) 10 mg cap Take  by mouth. Other, MD [facility]   IODINE Take 1 Tab by mouth daily.  [facility] Iodine  Complex 12.5 Other, MD [facility]   ferrous fumarate/vit Bcomp,C (SUPER B COMPLEX PO) Take 1 Tab by mouth daily. Provider, Historical   biotin 10,000 mcg cap Take 1 Cap by mouth two (2) times a day. Provider, Historical   potassium 99 mg tablet Take 2 Tabs by mouth daily. Provider, Historical   lysine (L-LYSINE) 500 mg tab tablet Take 1 Tab by mouth daily. Provider, Historical   cholecalciferol (VITAMIN D3) 1,000 unit cap Take 1,000 Units by mouth daily. Provider, Historical   glucosamine-chondroitin (ARTHX) 500-400 mg cap Take 1 Cap by mouth two (2) times a day. Provider, Historical     Patient Active Problem List    Diagnosis Date Noted    Type 2 diabetes mellitus without complication, [facility] 10/24/2019    Hypothyroidism     Depression     Migraine headache     Fluid retention      Current Outpatient Medications   Medication Sig Dispense Refill    nirmatrelvir-ritonavir (Paxlovid, EUA,) 150 mg x 2- 100 mg tablet As directed. Indications: COVID-19 (emergency use authorization) 1 Box 0    levothyroxine (SYNTHROID) 100 mcg tablet Take 1 Tablet by mouth six (6) days a week. Indications: a condition with low thyroid hormone levels 90 Tablet 1    hydroCHLOROthiazide (HYDRODIURIL) 25 mg tablet Take 1 Tablet by mouth daily. 90 Tablet 1    OTHER Bioidentical hormones topical cream from [employer].  escitalopram oxalate (LEXAPRO) 20 mg tablet Take 1 Tablet by mouth daily. Indications: major depressive disorder 90 Tablet 1    Lantus Solostar U-100 Insulin 100 unit/mL (3 mL) inpn INJECT 20 UNITS SUBCUTANEOUSLY NIGHTLY 15 mL 3    Insulin Needles, Disposable, 31 gauge x 5/16\" ndle Use daily with insulin pen. 1 Package 11    OTHER Take 1 Tab by mouth daily as needed. Probiotic  20 billion      calcium citrate-vitamin D3 (Citracal + D) tablet Take 2 Tabs by mouth two (2) times a day.  melatonin 5 mg cap capsule Take 5 mg by mouth nightly.       coenzyme q10 (Co Q-10) 10 mg cap Take  by mouth.  IODINE Take 1 Tab by mouth daily. Tripe Iodine  Complex 12.5      ferrous fumarate/vit Bcomp,C (SUPER B COMPLEX PO) Take 1 Tab by mouth daily.  biotin 10,000 mcg cap Take 1 Cap by mouth two (2) times a day.  potassium 99 mg tablet Take 2 Tabs by mouth daily.  lysine (L-LYSINE) 500 mg tab tablet Take 1 Tab by mouth daily.  cholecalciferol (VITAMIN D3) 1,000 unit cap Take 1,000 Units by mouth daily.  glucosamine-chondroitin (ARTHX) 500-400 mg cap Take 1 Cap by mouth two (2) times a day. Allergies   Allergen Reactions    Metformin Diarrhea     Past Medical History:   Diagnosis Date    Cyst (solitary) of breast, left 05/2021    epidermal inclusion    Depression     Diabetes ([facility]     Fluid retention     Hypothyroidism     Migraine headache        ROS  Started with HA, body aches, fatigue a week ago. Tested COVID + Wed night. Has congestion. Dry throat. No SOB. Min cough. No COVID vaccines. Pt is diabetic. Son and  all tested + COVID. Objective:   No flowsheet data found. General: alert, cooperative, no distress   Mental  status: normal mood, behavior, speech, dress, motor activity, and thought processes, able to follow commands   HENT: NCAT   Neck: no visualized mass   Resp: no respiratory distress   Neuro: no gross deficits   Skin: no discoloration or lesions of concern on visible areas   Psychiatric: normal affect, consistent with stated mood, no evidence of hallucinations     Additional exam findings: We discussed the expected course, resolution and complications of the diagnosis(es) in detail. Medication risks, benefits, costs, interactions, and alternatives were discussed as indicated. I advised her to contact the office if her condition worsens, changes or fails to improve as anticipated. She expressed understanding with the diagnosis(es) and plan.      Stephani Wang, was evaluated through a synchronous (real-time) audio-video encounter. The patient (or guardian if applicable) is aware that this is a billable service, which includes applicable co-pays. Verbal consent to proceed has been obtained. The visit was conducted pursuant to the emergency declaration under the [address] [place] [address] authority and the [employer] Act. Patient identification was verified, and a caregiver was present when appropriate.  The patient was located at home in a state where the provider was licensed to provide car

## 2022-04-22 NOTE — TELEPHONE ENCOUNTER
I spoke with pt. She is now interested in taking Paxlovid. Will send to [facility].

## 2022-05-05 ENCOUNTER — PATIENT MESSAGE (OUTPATIENT)
Dept: FAMILY MEDICINE CLINIC | Age: 62
End: 2022-05-05

## 2022-05-27 ENCOUNTER — TELEPHONE (OUTPATIENT)
Dept: FAMILY MEDICINE CLINIC | Age: 62
End: 2022-05-27

## 2022-06-15 PROBLEM — Z79.899 ENCOUNTER FOR LONG-TERM CURRENT USE OF MEDICATION: Status: ACTIVE | Noted: 2022-06-15

## 2022-06-15 PROBLEM — Z79.4 CONTROLLED TYPE 2 DIABETES MELLITUS WITHOUT COMPLICATION, WITH LONG-TERM CURRENT USE OF INSULIN (HCC): Status: ACTIVE | Noted: 2022-06-15

## 2022-06-15 PROBLEM — E11.9 CONTROLLED TYPE 2 DIABETES MELLITUS WITHOUT COMPLICATION, WITH LONG-TERM CURRENT USE OF INSULIN (HCC): Status: ACTIVE | Noted: 2022-06-15

## 2022-06-16 ENCOUNTER — OFFICE VISIT (OUTPATIENT)
Dept: FAMILY MEDICINE CLINIC | Age: 62
End: 2022-06-16
Payer: MEDICAID

## 2022-06-16 VITALS
WEIGHT: 191 LBS | SYSTOLIC BLOOD PRESSURE: 130 MMHG | RESPIRATION RATE: 16 BRPM | BODY MASS INDEX: 33.84 KG/M2 | OXYGEN SATURATION: 98 % | TEMPERATURE: 97.8 F | HEART RATE: 76 BPM | DIASTOLIC BLOOD PRESSURE: 70 MMHG | HEIGHT: 63 IN

## 2022-06-16 DIAGNOSIS — E03.9 ACQUIRED HYPOTHYROIDISM: ICD-10-CM

## 2022-06-16 DIAGNOSIS — Z79.4 CONTROLLED TYPE 2 DIABETES MELLITUS WITHOUT COMPLICATION, WITH LONG-TERM CURRENT USE OF INSULIN (HCC): Primary | ICD-10-CM

## 2022-06-16 DIAGNOSIS — Z79.899 ENCOUNTER FOR LONG-TERM CURRENT USE OF MEDICATION: ICD-10-CM

## 2022-06-16 DIAGNOSIS — E11.9 CONTROLLED TYPE 2 DIABETES MELLITUS WITHOUT COMPLICATION, WITH LONG-TERM CURRENT USE OF INSULIN (HCC): Primary | ICD-10-CM

## 2022-06-16 DIAGNOSIS — R60.9 FLUID RETENTION: ICD-10-CM

## 2022-06-16 PROCEDURE — 99214 OFFICE O/P EST MOD 30 MIN: CPT | Performed by: FAMILY MEDICINE

## 2022-06-16 NOTE — PROGRESS NOTES
Identified pt with two pt identifiers(name and ).     Chief Complaint   Patient presents with    Diabetes    Labs     fasting        Health Maintenance Due   Topic    COVID-19 Vaccine (1)    Pneumococcal 0-64 years (1 - PCV)    DTaP/Tdap/Td series (1 - Tdap)    Cervical cancer screen     Shingrix Vaccine Age 50> (1 of 2)    Foot Exam Q1     MICROALBUMIN Q1     Colorectal Cancer Screening Combo        Wt Readings from Last 3 Encounters:   22 191 lb (86.6 kg)   21 182 lb (82.6 kg)   21 174 lb (78.9 kg)     Temp Readings from Last 3 Encounters:   22 97.8 °F (36.6 °C) (Temporal)   21 97.4 °F (36.3 °C) (Temporal)   21 97.8 °F (36.6 °C) (Temporal)     BP Readings from Last 3 Encounters:   22 130/70   21 128/72   21 136/80     Pulse Readings from Last 3 Encounters:   22 76   21 74   21 82         Learning Assessment:  :     Learning Assessment 2021 10/24/2019   PRIMARY LEARNER Patient Patient   BARRIERS PRIMARY LEARNER NONE -   CO-LEARNER CAREGIVER No -   PRIMARY LANGUAGE ENGLISH ENGLISH   LEARNER PREFERENCE PRIMARY LISTENING DEMONSTRATION     DEMONSTRATION -   ANSWERED BY patient self   RELATIONSHIP SELF SELF       Depression Screening:  :     3 most recent PHQ Screens 2022   PHQ Not Done -   Little interest or pleasure in doing things Not at all   Feeling down, depressed, irritable, or hopeless Not at all   Total Score PHQ 2 0   Trouble falling or staying asleep, or sleeping too much -   Feeling tired or having little energy -   Poor appetite, weight loss, or overeating -   Feeling bad about yourself - or that you are a failure or have let yourself or your family down -   Trouble concentrating on things such as school, work, reading, or watching TV -   Moving or speaking so slowly that other people could have noticed; or the opposite being so fidgety that others notice -   Thoughts of being better off dead, or hurting yourself in Subjective:       Patient ID: Rossana Snow is a 39 y.o. female.    Chief Complaint:  Well Woman and Possible Pregnancy      History of Present Illness   patient presents with a positive pregnancy test.  According to patient's last menstrual period of 2018 should be 16 weeks and 2 days today.  Patient admits to feeling flutters.  She admits she has been on over-the-counter prenatal vitamins.  She has no current health problems.  She has had 1 previous vaginal delivery at term    Menstrual History:  OB History      Para Term  AB Living    1 1 1     1    SAB TAB Ectopic Multiple Live Births            1         Menarche age:   Patient's last menstrual period was 2018 (exact date).         Review of Systems  Review of Systems   Constitutional: Negative for activity change, appetite change, chills, diaphoresis, fatigue, fever and unexpected weight change.   HENT: Negative for congestion, dental problem, drooling, ear discharge, ear pain, facial swelling, hearing loss, mouth sores, nosebleeds, postnasal drip, rhinorrhea, sinus pressure, sneezing, sore throat, tinnitus, trouble swallowing and voice change.    Eyes: Negative for photophobia, pain, discharge, redness, itching and visual disturbance.   Respiratory: Negative for apnea, cough, choking, chest tightness, shortness of breath, wheezing and stridor.    Cardiovascular: Negative for chest pain, palpitations and leg swelling.   Gastrointestinal: Negative for abdominal distention, abdominal pain, anal bleeding, blood in stool, constipation, diarrhea, nausea, rectal pain and vomiting.   Endocrine: Negative for cold intolerance, heat intolerance, polydipsia, polyphagia and polyuria.   Genitourinary: Negative for decreased urine volume, difficulty urinating, dyspareunia, dysuria, enuresis, flank pain, frequency, genital sores, hematuria, menstrual problem, pelvic pain, urgency, vaginal bleeding, vaginal discharge and vaginal pain.    Musculoskeletal: Negative for arthralgias, back pain, gait problem, joint swelling, myalgias, neck pain and neck stiffness.   Skin: Negative for color change, pallor, rash and wound.   Allergic/Immunologic: Negative for environmental allergies, food allergies and immunocompromised state.   Neurological: Positive for headaches. Negative for dizziness, tremors, seizures, syncope, facial asymmetry, speech difficulty, weakness, light-headedness and numbness.   Hematological: Negative for adenopathy. Does not bruise/bleed easily.   Psychiatric/Behavioral: Negative for agitation, behavioral problems, confusion, decreased concentration, dysphoric mood, hallucinations, self-injury, sleep disturbance and suicidal ideas. The patient is not nervous/anxious and is not hyperactive.            Objective:      Physical Exam   Constitutional: She is oriented to person, place, and time. She appears well-developed and well-nourished.   Neck: No thyromegaly present.   Cardiovascular: Normal rate and regular rhythm.   Pulmonary/Chest: Effort normal and breath sounds normal.   Abdominal: Soft. Bowel sounds are normal. She exhibits no mass. There is no tenderness. Hernia confirmed negative in the right inguinal area and confirmed negative in the left inguinal area.   Genitourinary: Vagina normal and uterus normal. Rectal exam shows no external hemorrhoid. No breast tenderness or discharge. Uterus is not enlarged and not tender. Cervix exhibits no motion tenderness, no discharge and no friability. Right adnexum displays no mass, no tenderness and no fullness. Left adnexum displays no mass, no tenderness and no fullness. No tenderness in the vagina. No foreign body in the vagina. No vaginal discharge found.   Musculoskeletal: Normal range of motion.   Lymphadenopathy:        Right: No inguinal adenopathy present.        Left: No inguinal adenopathy present.   Neurological: She is alert and oriented to person, place, and time. She has  some way -   PHQ 9 Score -   How difficult have these problems made it for you to do your work, take care of your home and get along with others -       Fall Risk Assessment:  :     No flowsheet data found. Abuse Screening:  :     Abuse Screening Questionnaire 6/16/2022 12/27/2021 9/14/2021 7/7/2021 2/25/2021 1/24/2020 6/14/2019   Do you ever feel afraid of your partner? N N N N N N N   Are you in a relationship with someone who physically or mentally threatens you? N N N N N N N   Is it safe for you to go home? Y Y Y Y Y Y Y       Coordination of Care Questionnaire:  :     1) Have you been to an emergency room, urgent care clinic since your last visit? no   Hospitalized since your last visit? no             2) Have you seen or consulted any other health care providers outside of [address] [place] [address] since your last visit? no  (Include any pap smears or colon screenings in this section.)    3) Do you have an Advance Directive on file? yes  Are you interested in receiving information about Advance Directives? no    Patient is accompanied by N/A I have received verbal consent from Myron Nicholas to discuss any/all medical information while they are present in the room. 4.  For patients aged 39-70: Has the patient had a colonoscopy / FIT/ Cologuard? No      If the patient is female:    5. For patients aged 41-77: Has the patient had a mammogram within the past 2 years? No      6. For patients aged 21-65: Has the patient had a pap smear?  No normal reflexes.   Skin: Skin is dry.   Psychiatric: She has a normal mood and affect. Her behavior is normal. Judgment and thought content normal.   Nursing note and vitals reviewed.          Assessment:        1. Positive pregnancy test    2. Elderly multigravida in second trimester                Plan:         Rossana was seen today for well woman and possible pregnancy.    Diagnoses and all orders for this visit:    Positive pregnancy test  -     C. trachomatis/N. gonorrhoeae by AMP DNA  -     pnv-iron-folic-docusate-om3s ( PRENATAL DHA ONE) 27-1- mg; Take 1 tablet by mouth once daily.  -     CBC auto differential; Future  -     Cystic Fibrosis Mutation Panel; Future  -     Hepatitis B surface antigen; Future  -     HIV 1/2 Ag/Ab (4th Gen); Future  -     RPR; Future  -     Rubella antibody, IgG; Future  -     TSH; Future  -     Type & Screen; Future  -     US OB/GYN Procedure ([facility]) - Extended List; Future    Elderly multigravida in second trimester  -     C. trachomatis/N. gonorrhoeae by AMP DNA  -     pnv-iron-folic-docusate-om3s ( PRENATAL DHA ONE) 27-1- mg; Take 1 tablet by mouth once daily.  -     CBC auto differential; Future  -     Cystic Fibrosis Mutation Panel; Future  -     Hepatitis B surface antigen; Future  -     HIV 1/2 Ag/Ab (4th Gen); Future  -     RPR; Future  -     Rubella antibody, IgG; Future  -     TSH; Future  -     Type & Screen; Future  -     US OB/GYN Procedure ([facility]) - Extended List; Future

## 2022-06-16 NOTE — PROGRESS NOTES
Subjective:     Mirlande Alvarado is a 57 y.o. female who presents for follow up of diabetes, hypertension, hyperlipidemia and hypothyroidism. Diet and Lifestyle: generally follows a low fat low cholesterol diet, generally follows a low sodium diet, nonsmoker  Home BP Monitoring: is not measured at home    Cardiovascular ROS: taking medications as instructed, no medication side effects noted, no TIA's, no chest pain on exertion, no dyspnea on exertion, no swelling of ankles. New concerns: due for labs. FBS readings 's on current dose Lantus. Patient Active Problem List    Diagnosis Date Noted    Controlled type 2 diabetes mellitus without complication, with long-term current use of insulin ([address] 06/15/2022    Encounter for long-term current use of medication 06/15/2022    Hypothyroidism     Depression     Migraine headache     Fluid retention      Current Outpatient Medications   Medication Sig Dispense Refill    levothyroxine (SYNTHROID) 100 mcg tablet Take 1 Tablet by mouth six (6) days a week. Indications: a condition with low thyroid hormone levels 90 Tablet 1    hydroCHLOROthiazide (HYDRODIURIL) 25 mg tablet Take 1 Tablet by mouth daily. 90 Tablet 1    OTHER Bioidentical hormones topical cream from [employer].  escitalopram oxalate (LEXAPRO) 20 mg tablet Take 1 Tablet by mouth daily. Indications: major depressive disorder 90 Tablet 1    Lantus Solostar U-100 Insulin 100 unit/mL (3 mL) inpn INJECT 20 UNITS SUBCUTANEOUSLY NIGHTLY 15 mL 3    Insulin Needles, Disposable, 31 gauge x 5/16\" ndle Use daily with insulin pen. 1 Package 11    OTHER Take 1 Tab by mouth daily as needed. Probiotic  20 billion      calcium citrate-vitamin D3 (Citracal + D) tablet Take 2 Tabs by mouth two (2) times a day.  melatonin 5 mg cap capsule Take 5 mg by mouth nightly.  coenzyme q10 (Co Q-10) 10 mg cap Take  by mouth.  IODINE Take 1 Tab by mouth daily.  [facility] Iodine  Complex 12.5  ferrous fumarate/vit Bcomp,C (SUPER B COMPLEX PO) Take 1 Tab by mouth daily.  biotin 10,000 mcg cap Take 1 Cap by mouth two (2) times a day.  potassium 99 mg tablet Take 2 Tabs by mouth daily.  lysine (L-LYSINE) 500 mg tab tablet Take 1 Tab by mouth daily.  cholecalciferol (VITAMIN D3) 1,000 unit cap Take 1,000 Units by mouth daily.  glucosamine-chondroitin (ARTHX) 500-400 mg cap Take 1 Cap by mouth two (2) times a day. Allergies   Allergen Reactions    Metformin Diarrhea     Past Medical History:   Diagnosis Date    COVID-19 2022    Cyst (solitary) of breast, left 2021    epidermal inclusion    Depression     [facility] ([facility]     Fluid retention     Hypothyroidism     Migraine headache      Past Surgical History:   Procedure Laterality Date    HX  SECTION      in  and     HX CHOLECYSTECTOMY       Family History   Problem Relation Age of Onset    No Known Problems Mother     Heart Failure Father      Social History     Tobacco Use    Smoking status: Never Smoker    Smokeless tobacco: Never Used   Substance Use Topics    Alcohol use: Yes     Comment: very seldom             Review of Systems, additional:  Pertinent items are noted in HPI. Objective:     Visit Vitals  /70 (BP 1 Location: Right arm, BP Patient Position: Sitting, BP Cuff Size: Adult)   Pulse 76   Temp 97.8 °F (36.6 °C) (Temporal)   Resp 16   Ht 5' 3\" (1.6 m)   Wt 191 lb (86.6 kg)   SpO2 98%   BMI 33.83 kg/m²     Appearance: alert, well appearing, and in no distress. General exam: CVS exam BP noted to be well controlled today in office, S1, S2 normal, no gallop, no murmur, chest clear, no JVD, no HSM, no edema. Lab review: orders written for new lab studies as appropriate; see orders. Assessment/Plan:     . Jorge Terry ICD-10-CM ICD-9-CM    1.  Controlled type 2 diabetes mellitus without complication, with long-term current use of insulin (HCC)  E11.9 250.00 LIPID PANEL Z79.4 V58.67 MICROALBUMIN, UR, RAND W/ MICROALB/CREAT RATIO      HEMOGLOBIN A1C WITH EAG      LIPID PANEL      MICROALBUMIN, UR, RAND W/ MICROALB/CREAT RATIO      HEMOGLOBIN A1C WITH EAG   2. Acquired hypothyroidism  E03.9 244.9 TSH 3RD GENERATION      T4, FREE      TSH 3RD GENERATION      T4, FREE   3. Fluid retention  R60.9 276.69    4. Encounter for long-term current use of medication  Z79.899 V58.69 CBC WITH AUTOMATED DIFF      METABOLIC PANEL, COMPREHENSIVE      CBC WITH AUTOMATED DIFF      METABOLIC PANEL, COMPREHENSIVE     Order labs.   Remind pt to schedule PAP

## 2022-06-16 NOTE — PATIENT INSTRUCTIONS
Diabetes and Preventing Falls: Care Instructions  Overview     Complications of diabetes--such as nerve damage, foot problems, and reduced vision--may increase your risk of a fall. Some of your medicines also may add to your risk. By making your home safer, you can lower your risk of falling. Doing things to prevent diabetes complications may also help to lower your risk. You can make your home safer with a few simple measures. Follow-up care is a key part of your treatment and safety. Be sure to make and go to all appointments, and call your doctor if you are having problems. It's also a good idea to know your test results and keep a list of the medicines you take. How can you care for yourself at home? Taking care of yourself  · Keep your blood sugar at a target level (which you set with your doctor). · Exercise regularly to improve your strength, muscle tone, and balance. Walk if you can. Swimming may be a good choice if you cannot walk easily. · Have your vision checked as often as your doctor recommends. It is usually once a year or more often if you have eye problems. · Know the side effects of the medicines you take. Ask your doctor or pharmacist whether the medicines you take can affect your balance. Sleeping pills or sedatives can affect your balance. · Limit the amount of alcohol you drink. Alcohol can impair your balance and other senses. · Have your doctor check your feet during each visit. If you have a foot problem, see your doctor. Preventing falls at home  · Remove raised doorway thresholds, throw rugs, and clutter. Repair loose carpet or raised areas in the floor. · Move furniture and electrical cords to keep them out of walking paths. · Use nonskid floor wax, and wipe up spills right away, especially on ceramic tile floors. · If you use a walker or cane, put rubber tips on it.  If you use crutches, clean the bottoms of them regularly with an abrasive pad, such as steel wool.  · Keep your house well lit, especially [address], and outside walkways. Use night-lights in areas such as hallways and bathrooms. Add extra light switches or use remote switches (such as switches that go on or off when you clap your hands) to make it easier to turn lights on if you have to get up during the night. · Install sturdy handrails on stairways. Put grab bars near your shower, bathtub, and toilet. · Store household items on low shelves so that you do not have to climb or reach high. Or use a reaching device that you can get at a medical supply store. If you have to climb for something, use a step stool with handrails, or ask someone to get it for you. · Keep a cordless phone and a flashlight with new batteries by your bed. If possible, put a phone in each of the main rooms of your house, or carry a cell phone in case you fall and cannot reach a phone. Or you can wear a device around your neck or wrist. You push a button that sends a signal for help. · Wear low-heeled shoes that fit well and give your feet good support. Use footwear with nonskid soles. Check the heels and soles of your shoes for wear. Repair or replace worn heels or soles. · Do not wear socks without shoes on wood floors. · Walk on the grass when the sidewalks are slippery. If you live in an area that gets snow and ice in the winter, sprinkle salt on slippery steps and sidewalks. Where can you learn more? Go to http://www.gray.com/  Enter X601 in the search box to learn more about \"Diabetes and Preventing Falls: Care Instructions. \"  Current as of: September 8, 2021               Content Version: 13.2  © 8867-5889 [employer]. Care instructions adapted under license by [employer] (which disclaims liability or warranty for this information).  If you have questions about a medical condition or this instruction, always ask your healthcare professional. [employer] disclaims any warranty or liability for your use of this information.

## 2022-06-18 LAB
ALBUMIN SERPL-MCNC: 4.6 G/DL (ref 3.8–4.8)
ALBUMIN/CREAT UR: 6 MG/G CREAT (ref 0–29)
ALBUMIN/GLOB SERPL: 2.2 {RATIO} (ref 1.2–2.2)
ALP SERPL-CCNC: 96 IU/L (ref 44–121)
ALT SERPL-CCNC: 20 IU/L (ref 0–32)
AST SERPL-CCNC: 20 IU/L (ref 0–40)
BASOPHILS # BLD AUTO: 0.1 X10E3/UL (ref 0–0.2)
BASOPHILS NFR BLD AUTO: 1 %
BILIRUB SERPL-MCNC: 0.4 MG/DL (ref 0–1.2)
BUN SERPL-MCNC: 15 MG/DL (ref 8–27)
BUN/CREAT SERPL: 17 (ref 12–28)
CALCIUM SERPL-MCNC: 10.2 MG/DL (ref 8.7–10.3)
CHLORIDE SERPL-SCNC: 102 MMOL/L (ref 96–106)
CHOLEST SERPL-MCNC: 231 MG/DL (ref 100–199)
CO2 SERPL-SCNC: 27 MMOL/L (ref 20–29)
CREAT SERPL-MCNC: 0.87 MG/DL (ref 0.57–1)
CREAT UR-MCNC: 95.9 MG/DL
EGFR: 74 ML/MIN/1.73
EOSINOPHIL # BLD AUTO: 0.2 X10E3/UL (ref 0–0.4)
EOSINOPHIL NFR BLD AUTO: 3 %
ERYTHROCYTE [DISTWIDTH] IN BLOOD BY AUTOMATED COUNT: 14.3 % (ref 11.7–15.4)
EST. AVERAGE GLUCOSE BLD GHB EST-MCNC: 183 MG/DL
GLOBULIN SER CALC-MCNC: 2.1 G/DL (ref 1.5–4.5)
GLUCOSE SERPL-MCNC: 134 MG/DL (ref 65–99)
HBA1C MFR BLD: 8 % (ref 4.8–5.6)
HCT VFR BLD AUTO: 47.5 % (ref 34–46.6)
HDLC SERPL-MCNC: 71 MG/DL
HGB BLD-MCNC: 15.4 G/DL (ref 11.1–15.9)
IMM GRANULOCYTES # BLD AUTO: 0 X10E3/UL (ref 0–0.1)
IMM GRANULOCYTES NFR BLD AUTO: 0 %
IMP & REVIEW OF LAB RESULTS: NORMAL
LDLC SERPL CALC-MCNC: 142 MG/DL (ref 0–99)
LYMPHOCYTES # BLD AUTO: 1.4 X10E3/UL (ref 0.7–3.1)
LYMPHOCYTES NFR BLD AUTO: 25 %
MCH RBC QN AUTO: 29.4 PG (ref 26.6–33)
MCHC RBC AUTO-ENTMCNC: 32.4 G/DL (ref 31.5–35.7)
MCV RBC AUTO: 91 FL (ref 79–97)
MICROALBUMIN UR-MCNC: 5.8 UG/ML
MONOCYTES # BLD AUTO: 0.5 X10E3/UL (ref 0.1–0.9)
MONOCYTES NFR BLD AUTO: 8 %
NEUTROPHILS # BLD AUTO: 3.6 X10E3/UL (ref 1.4–7)
NEUTROPHILS NFR BLD AUTO: 63 %
PLATELET # BLD AUTO: 319 X10E3/UL (ref 150–450)
POTASSIUM SERPL-SCNC: 5.3 MMOL/L (ref 3.5–5.2)
PROT SERPL-MCNC: 6.7 G/DL (ref 6–8.5)
RBC # BLD AUTO: 5.24 X10E6/UL (ref 3.77–5.28)
SODIUM SERPL-SCNC: 143 MMOL/L (ref 134–144)
T4 FREE SERPL-MCNC: 1.17 NG/DL (ref 0.82–1.77)
TRIGL SERPL-MCNC: 102 MG/DL (ref 0–149)
TSH SERPL DL<=0.005 MIU/L-ACNC: 13.9 UIU/ML (ref 0.45–4.5)
VLDLC SERPL CALC-MCNC: 18 MG/DL (ref 5–40)
WBC # BLD AUTO: 5.7 X10E3/UL (ref 3.4–10.8)

## 2022-06-21 ENCOUNTER — TELEPHONE (OUTPATIENT)
Dept: FAMILY MEDICINE CLINIC | Age: 62
End: 2022-06-21

## 2022-06-21 DIAGNOSIS — E03.9 ACQUIRED HYPOTHYROIDISM: ICD-10-CM

## 2022-06-21 DIAGNOSIS — Z79.4 CONTROLLED TYPE 2 DIABETES MELLITUS WITHOUT COMPLICATION, WITH LONG-TERM CURRENT USE OF INSULIN (HCC): Primary | ICD-10-CM

## 2022-06-21 DIAGNOSIS — E11.9 CONTROLLED TYPE 2 DIABETES MELLITUS WITHOUT COMPLICATION, WITH LONG-TERM CURRENT USE OF INSULIN (HCC): Primary | ICD-10-CM

## 2022-06-21 DIAGNOSIS — E78.00 HYPERCHOLESTEROLEMIA: ICD-10-CM

## 2022-06-21 DIAGNOSIS — E11.9 TYPE 2 DIABETES MELLITUS WITHOUT COMPLICATION, WITHOUT LONG-TERM CURRENT USE OF INSULIN (HCC): ICD-10-CM

## 2022-06-21 RX ORDER — INSULIN GLARGINE 100 [IU]/ML
24 INJECTION, SOLUTION SUBCUTANEOUS
Qty: 15 ML | Refills: 3 | Status: SHIPPED | OUTPATIENT
Start: 2022-06-21 | End: 2022-11-03

## 2022-06-21 RX ORDER — LEVOTHYROXINE SODIUM 112 UG/1
112 TABLET ORAL
Qty: 30 TABLET | Refills: 5 | Status: SHIPPED | OUTPATIENT
Start: 2022-06-21 | End: 2022-11-03 | Stop reason: DRUGHIGH

## 2022-06-21 RX ORDER — ATORVASTATIN CALCIUM 20 MG/1
20 TABLET, FILM COATED ORAL
Qty: 30 TABLET | Refills: 5 | Status: SHIPPED | OUTPATIENT
Start: 2022-06-21

## 2022-06-22 ENCOUNTER — TELEPHONE (OUTPATIENT)
Dept: FAMILY MEDICINE CLINIC | Age: 62
End: 2022-06-22

## 2022-06-22 NOTE — PROGRESS NOTES
Labs show higher A1C level indicating higher sugars. Plan to increase Lantus dose 24 units. Also very abnormal thyroid TSH. Will need to adjust dose of thyroid tablet. Cholesterol numbers are much higher, too. I recommend start on statin therapy. Follow up in 3-4 months.

## 2022-06-22 NOTE — LETTER
6/22/2022 10:05 AM    Ms. Azucena Oh  [address] [address]          Labs show higher A1C level indicating higher sugars. Plan to increase Lantus dose 24 units.  Also very abnormal thyroid TSH.  Will need to adjust dose of thyroid tablet.  Cholesterol numbers are much higher, too. I recommend start on statin therapy.  Follow up in 3-4 months. Sincerely,    Liberty Barragan MD    Results for orders placed or performed in visit on 06/16/22   CBC WITH AUTOMATED DIFF   Result Value Ref Range    WBC 5.7 3.4 - 10.8 x10E3/uL    RBC 5.24 3.77 - 5.28 x10E6/uL    HGB 15.4 11.1 - 15.9 g/dL    HCT 47.5 (H) 34.0 - 46.6 %    MCV 91 79 - 97 fL    MCH 29.4 26.6 - 33.0 pg    MCHC 32.4 31.5 - 35.7 g/dL    RDW 14.3 11.7 - 15.4 %    PLATELET 365 672 - 123 x10E3/uL    NEUTROPHILS 63 Not Estab. %    Lymphocytes 25 Not Estab. %    MONOCYTES 8 Not Estab. %    EOSINOPHILS 3 Not Estab. %    BASOPHILS 1 Not Estab. %    ABS. NEUTROPHILS 3.6 1.4 - 7.0 x10E3/uL    Abs Lymphocytes 1.4 0.7 - 3.1 x10E3/uL    ABS. MONOCYTES 0.5 0.1 - 0.9 x10E3/uL    ABS. EOSINOPHILS 0.2 0.0 - 0.4 x10E3/uL    ABS. BASOPHILS 0.1 0.0 - 0.2 x10E3/uL    IMMATURE GRANULOCYTES 0 Not Estab. %    ABS. IMM. GRANS. 0.0 0.0 - 0.1 C05M6/GO   METABOLIC PANEL, COMPREHENSIVE   Result Value Ref Range    Glucose 134 (H) 65 - 99 mg/dL    BUN 15 8 - 27 mg/dL    Creatinine 0.87 0.57 - 1.00 mg/dL    eGFR 74 >59 mL/min/1.73    BUN/Creatinine ratio 17 12 - 28    Sodium 143 134 - 144 mmol/L    Potassium 5.3 (H) 3.5 - 5.2 mmol/L    Chloride 102 96 - 106 mmol/L    CO2 27 20 - 29 mmol/L    Calcium 10.2 8.7 - 10.3 mg/dL    Protein, total 6.7 6.0 - 8.5 g/dL    Albumin 4.6 3.8 - 4.8 g/dL    GLOBULIN, TOTAL 2.1 1.5 - 4.5 g/dL    A-G Ratio 2.2 1.2 - 2.2    Bilirubin, total 0.4 0.0 - 1.2 mg/dL    Alk.  phosphatase 96 44 - 121 IU/L    AST (SGOT) 20 0 - 40 IU/L    ALT (SGPT) 20 0 - 32 IU/L   LIPID PANEL   Result Value Ref Range    Cholesterol, total 231 (H) 100 - 199 mg/dL Triglyceride 102 0 - 149 mg/dL    HDL Cholesterol 71 >39 mg/dL    VLDL, calculated 18 5 - 40 mg/dL    LDL, calculated 142 (H) 0 - 99 mg/dL   MICROALBUMIN, UR, RAND W/ MICROALB/CREAT RATIO   Result Value Ref Range    Creatinine, urine 95.9 Not Estab. mg/dL    Microalbumin, urine 5.8 Not Estab. ug/mL    Microalb/Creat ratio (ug/mg creat.) 6 0 - 29 mg/g creat   HEMOGLOBIN A1C WITH EAG   Result Value Ref Range    Hemoglobin A1c 8.0 (H) 4.8 - 5.6 %    Estimated average glucose 183 mg/dL   TSH 3RD GENERATION   Result Value Ref Range    TSH 13.900 (H) 0.450 - 4.500 uIU/mL   T4, FREE   Result Value Ref Range    T4, Free 1.17 0.82 - 1.77 ng/dL   CVD REPORT   Result Value Ref Range    INTERPRETATION Note

## 2022-06-22 NOTE — TELEPHONE ENCOUNTER
1. Adjust Lantus 24 units. 2. Order statin. 3. Adjust dose levothyroxine. New RX's sent to [facility]. FU labs in 3 months.

## 2022-06-22 NOTE — TELEPHONE ENCOUNTER
----- Message from Tatianna Watkins MD sent at 6/21/2022 11:01 PM EDT -----  Labs show higher A1C level indicating higher sugars. Plan to increase Lantus dose 24 units. Also very abnormal thyroid TSH. Will need to adjust dose of thyroid tablet. Cholesterol numbers are much higher, too. I recommend start on statin therapy. Follow up in 3-4 months.

## 2022-06-22 NOTE — TELEPHONE ENCOUNTER
Spoke to pt and relayed result message. She understood and asked to  lab letter. I have put at  for her.

## 2022-06-27 DIAGNOSIS — E11.9 TYPE 2 DIABETES MELLITUS WITHOUT COMPLICATION, WITHOUT LONG-TERM CURRENT USE OF INSULIN (HCC): ICD-10-CM

## 2022-06-27 RX ORDER — PEN NEEDLE, DIABETIC 30 GX3/16"
NEEDLE, DISPOSABLE MISCELLANEOUS
Qty: 30 PEN NEEDLE | Refills: 12 | Status: SHIPPED | OUTPATIENT
Start: 2022-06-27

## 2022-07-09 DIAGNOSIS — F32.A DEPRESSION, UNSPECIFIED DEPRESSION TYPE: ICD-10-CM

## 2022-07-10 RX ORDER — ESCITALOPRAM OXALATE 20 MG/1
TABLET ORAL
Qty: 90 TABLET | Refills: 0 | Status: SHIPPED | OUTPATIENT
Start: 2022-07-10 | End: 2022-08-30

## 2022-08-02 ENCOUNTER — PATIENT MESSAGE (OUTPATIENT)
Dept: FAMILY MEDICINE CLINIC | Age: 62
End: 2022-08-02

## 2022-08-30 DIAGNOSIS — F32.A DEPRESSION, UNSPECIFIED DEPRESSION TYPE: ICD-10-CM

## 2022-08-30 RX ORDER — ESCITALOPRAM OXALATE 20 MG/1
TABLET ORAL
Qty: 90 TABLET | Refills: 1 | Status: SHIPPED | OUTPATIENT
Start: 2022-08-30

## 2022-09-22 ENCOUNTER — TELEPHONE (OUTPATIENT)
Dept: FAMILY MEDICINE CLINIC | Age: 62
End: 2022-09-22

## 2022-09-22 NOTE — TELEPHONE ENCOUNTER
Please call pt. She will need OV and bring in her insulin pen and needle so we can check how she does this. Also her A1C can get rechecked.

## 2022-09-22 NOTE — TELEPHONE ENCOUNTER
----- Message from Jaswant Last sent at 9/22/2022 11:18 AM EDT -----  Subject: Message to Provider    QUESTIONS  Information for Provider? Patient would like a call from PCP or clinical   staff to discuss her insulin shots/ glucose as well as discuss the surgery   she was to have last year. Please follow up  ---------------------------------------------------------------------------  --------------  [id number]  [phone]; OK to leave message on voicemail  ---------------------------------------------------------------------------  --------------  SCRIPT ANSWERS  Relationship to Patient?  Self

## 2022-09-22 NOTE — TELEPHONE ENCOUNTER
Called patient and relayed message. She understood and said that she will call back for an appointment.

## 2022-09-22 NOTE — TELEPHONE ENCOUNTER
Called patient and she stated that she is having problems giving herself her insulin. She stated that when she removed it from her skin, that there is more coming out of the needle. She also stated that sometimes it will come out of her skin where she gave it, and she doesn't think that she is getting the full dose of insulin. She also wanted name of doctor that she was referred to last year to have something removed from her breast (I gave her name)  She stated that they couldn't do procedure because of her glucose and A1C. She stated she is going to look into it again, but that she may need to have another A1C checked.

## 2022-10-23 NOTE — PROGRESS NOTES
Subjective:     Gela Wiseman is a 57 y.o. female who presents for follow up of diabetes, hypertension, and obesity. Diet and Lifestyle: generally follows a low fat low cholesterol diet, generally follows a low sodium diet, does not rigorously follow a diabetic diet, exercises regularly, nonsmoker  Home BP Monitoring: is not measured at home    Cardiovascular ROS: taking medications as instructed, no medication side effects noted, no TIA's, no chest pain on exertion, no dyspnea on exertion, no swelling of ankles. New concerns: sugars are all over the place. 30 day avg     Patient Active Problem List    Diagnosis Date Noted    Hypercholesterolemia 06/21/2022    Controlled type 2 diabetes mellitus without complication, with long-term current use of insulin ([facility] [address] 06/15/2022    Encounter for long-term current use of medication 06/15/2022    Hypothyroidism     Depression     Migraine headache     Fluid retention      Current Outpatient Medications   Medication Sig Dispense Refill    escitalopram oxalate (LEXAPRO) 20 mg tablet Take 1 tablet by mouth once daily 90 Tablet 1    Insulin Needles, Disposable, 31 gauge x 5/16\" ndle Use daily with insulin pen. DX E 11.9 30 Pen Needle 12    insulin glargine (Lantus Solostar U-100 Insulin) 100 unit/mL (3 mL) inpn 24 Units by SubCUTAneous route nightly. 15 mL 3    levothyroxine (SYNTHROID) 112 mcg tablet Take 1 Tablet by mouth Daily (before breakfast). Indications: a condition with low thyroid hormone levels 30 Tablet 5    atorvastatin (LIPITOR) 20 mg tablet Take 1 Tablet by mouth nightly. Indications: high cholesterol 30 Tablet 5    hydroCHLOROthiazide (HYDRODIURIL) 25 mg tablet Take 1 Tablet by mouth daily. 90 Tablet 1    OTHER Bioidentical hormones topical cream from [employer]. OTHER Take 1 Tab by mouth daily as needed.  Probiotic  20 billion      calcium citrate-vitamin D3 (CITRACAL WITH VITAMIN D MAXIMUM) tablet Take 2 Tabs by mouth two (2) times a day.      melatonin 5 mg cap capsule Take 5 mg by mouth nightly. coenzyme q10 10 mg cap Take  by mouth. IODINE Take 1 Tab by mouth daily. Tripe Iodine  Complex 12.5      ferrous fumarate/vit Bcomp,C (SUPER B COMPLEX PO) Take 1 Tab by mouth daily. biotin 10,000 mcg cap Take 1 Cap by mouth two (2) times a day. potassium 99 mg tablet Take 2 Tabs by mouth daily. lysine (L-LYSINE) 500 mg tab tablet Take 1 Tab by mouth daily. cholecalciferol (VITAMIN D3) 25 mcg (1,000 unit) cap Take 1,000 Units by mouth daily. glucosamine-chondroitin (ARTHX) 500-400 mg cap Take 1 Cap by mouth two (2) times a day. Allergies   Allergen Reactions    Metformin Diarrhea     Past Medical History:   Diagnosis Date    COVID-19 04/2022    Cyst (solitary) of breast, left 05/2021    epidermal inclusion    Depression     Diabetes ([facility] [address]     Fluid retention     Hypothyroidism     Migraine headache              Review of Systems, additional:  Pertinent items are noted in HPI. Objective:     Visit Vitals  BP (!) 144/80 (BP 1 Location: Left upper arm)   Pulse 74   Temp 98.1 °F (36.7 °C) (Temporal)   Resp 20   Ht 5' 3\" (1.6 m)   Wt 198 lb (89.8 kg)   SpO2 97%   BMI 35.07 kg/m²     Appearance: alert, well appearing, and in no distress and overweight. General exam: CVS exam BP noted to be mildly elevated today in office, S1, S2 normal, no gallop, no murmur, chest clear, no JVD, no HSM, no edema. Diabetic foot exam:     Left Foot:   Visual Exam: normal    Pulse DP: 2+ (normal)   Filament test: normal sensation          Right Foot:   Visual Exam: normal    Pulse DP: 2+ (normal)   Filament test: normal sensation        Lab review: orders written for new lab studies as appropriate; see orders. Assessment/Plan:     diabetes control uncertain, hypertension borderline controlled. orders and follow up as documented in patient record. ICD-10-CM ICD-9-CM    1.  Controlled type 2 diabetes mellitus without complication, with long-term current use of insulin (HCC)  E11.9 250.00 HEMOGLOBIN A1C WITH EAG    Z79.4 V58.67  DIABETES FOOT EXAM      HEMOGLOBIN A1C WITH EAG      2. Acquired hypothyroidism  E03.9 244.9 TSH 3RD GENERATION      T4, FREE      TSH 3RD GENERATION      T4, FREE      3. Screen for colon cancer  Z12.11 V76.51 OCCULT BLOOD IMMUNOASSAY,DIAGNOSTIC      OCCULT BLOOD IMMUNOASSAY,DIAGNOSTIC      4. Breast cancer screening by mammogram  Z12.31 V76.12       5. Hypercholesterolemia  E78.00 272.0 LIPID PANEL      LIPID PANEL      6.  Encounter for long-term current use of medication  G47.383 H40.54 METABOLIC PANEL, COMPREHENSIVE      METABOLIC PANEL, COMPREHENSIVE        Pt declined mammogram order, PAP  Declined FLU vaccine

## 2022-10-24 ENCOUNTER — OFFICE VISIT (OUTPATIENT)
Dept: FAMILY MEDICINE CLINIC | Age: 62
End: 2022-10-24
Payer: MEDICAID

## 2022-10-24 VITALS
HEIGHT: 63 IN | BODY MASS INDEX: 35.08 KG/M2 | SYSTOLIC BLOOD PRESSURE: 144 MMHG | DIASTOLIC BLOOD PRESSURE: 80 MMHG | OXYGEN SATURATION: 97 % | TEMPERATURE: 98.1 F | HEART RATE: 74 BPM | WEIGHT: 198 LBS | RESPIRATION RATE: 20 BRPM

## 2022-10-24 DIAGNOSIS — E03.9 ACQUIRED HYPOTHYROIDISM: ICD-10-CM

## 2022-10-24 DIAGNOSIS — Z12.11 SCREEN FOR COLON CANCER: ICD-10-CM

## 2022-10-24 DIAGNOSIS — E78.00 HYPERCHOLESTEROLEMIA: ICD-10-CM

## 2022-10-24 DIAGNOSIS — Z79.4 CONTROLLED TYPE 2 DIABETES MELLITUS WITHOUT COMPLICATION, WITH LONG-TERM CURRENT USE OF INSULIN (HCC): Primary | ICD-10-CM

## 2022-10-24 DIAGNOSIS — Z79.899 ENCOUNTER FOR LONG-TERM CURRENT USE OF MEDICATION: ICD-10-CM

## 2022-10-24 DIAGNOSIS — Z12.31 BREAST CANCER SCREENING BY MAMMOGRAM: ICD-10-CM

## 2022-10-24 DIAGNOSIS — E11.9 CONTROLLED TYPE 2 DIABETES MELLITUS WITHOUT COMPLICATION, WITH LONG-TERM CURRENT USE OF INSULIN (HCC): Primary | ICD-10-CM

## 2022-10-24 PROBLEM — Z53.20 MAMMOGRAM DECLINED: Status: ACTIVE | Noted: 2022-10-24

## 2022-10-24 PROBLEM — Z53.20 PAP SMEAR OF CERVIX DECLINED: Status: ACTIVE | Noted: 2022-10-24

## 2022-10-24 PROCEDURE — 3052F HG A1C>EQUAL 8.0%<EQUAL 9.0%: CPT | Performed by: FAMILY MEDICINE

## 2022-10-24 PROCEDURE — 99214 OFFICE O/P EST MOD 30 MIN: CPT | Performed by: FAMILY MEDICINE

## 2022-10-24 NOTE — PROGRESS NOTES
Identified pt with two pt identifiers(name and ).     Chief Complaint   Patient presents with    Diabetes     Followup    Labs     Fasting        Health Maintenance Due   Topic    DTaP/Tdap/Td series (1 - Tdap)    Cervical cancer screen     Shingrix Vaccine Age 50> (1 of 2)    Foot Exam Q1     Colorectal Cancer Screening Combo     Breast Cancer Screen Mammogram     Flu Vaccine (1)       Wt Readings from Last 3 Encounters:   10/24/22 198 lb (89.8 kg)   22 191 lb (86.6 kg)   21 182 lb (82.6 kg)     Temp Readings from Last 3 Encounters:   10/24/22 98.1 °F (36.7 °C) (Temporal)   22 97.8 °F (36.6 °C) (Temporal)   21 97.4 °F (36.3 °C) (Temporal)     BP Readings from Last 3 Encounters:   10/24/22 (!) 160/90   22 130/70   21 128/72     Pulse Readings from Last 3 Encounters:   10/24/22 74   22 76   21 74         Learning Assessment:  :     Learning Assessment 2021 10/24/2019   PRIMARY LEARNER Patient Patient   BARRIERS PRIMARY LEARNER NONE -   CO-LEARNER CAREGIVER No -   PRIMARY LANGUAGE ENGLISH ENGLISH   LEARNER PREFERENCE PRIMARY LISTENING DEMONSTRATION     DEMONSTRATION -   ANSWERED BY patient self   RELATIONSHIP SELF SELF       Depression Screening:  :     3 most recent PHQ Screens 10/24/2022   PHQ Not Done -   Little interest or pleasure in doing things Not at all   Feeling down, depressed, irritable, or hopeless Not at all   Total Score PHQ 2 0   Trouble falling or staying asleep, or sleeping too much -   Feeling tired or having little energy -   Poor appetite, weight loss, or overeating -   Feeling bad about yourself - or that you are a failure or have let yourself or your family down -   Trouble concentrating on things such as school, work, reading, or watching TV -   Moving or speaking so slowly that other people could have noticed; or the opposite being so fidgety that others notice -   Thoughts of being better off dead, or hurting yourself in some way -   PHQ 9 Score -   How difficult have these problems made it for you to do your work, take care of your home and get along with others -       Fall Risk Assessment:  :     No flowsheet data found. Abuse Screening:  :     Abuse Screening Questionnaire 10/24/2022 6/16/2022 12/27/2021 9/14/2021 7/7/2021 2/25/2021 1/24/2020   Do you ever feel afraid of your partner? N N N N N N N   Are you in a relationship with someone who physically or mentally threatens you? N N N N N N N   Is it safe for you to go home? Y Y Y Y Y Y Y       Coordination of Care Questionnaire:  :     1) Have you been to an emergency room, urgent care clinic since your last visit? no   Hospitalized since your last visit? no             2) Have you seen or consulted any other health care providers outside of [address] [place] [address] since your last visit? no  (Include any pap smears or colon screenings in this section.)    3) Do you have an Advance Directive on file? yes  Are you interested in receiving information about Advance Directives? no    Patient is accompanied by N/A I have received verbal consent from Nate Bahena to discuss any/all medical information while they are present in the room. 4.  For patients aged 39-70: Has the patient had a colonoscopy / FIT/ Cologuard? No      If the patient is female:    5. For patients aged 41-77: Has the patient had a mammogram within the past 2 years? No      6. For patients aged 21-65: Has the patient had a pap smear?  No

## 2022-10-25 ENCOUNTER — APPOINTMENT (OUTPATIENT)
Dept: FAMILY MEDICINE CLINIC | Age: 62
End: 2022-10-25

## 2022-10-26 LAB
ALBUMIN SERPL-MCNC: 4.4 G/DL (ref 3.8–4.8)
ALBUMIN/GLOB SERPL: 2 {RATIO} (ref 1.2–2.2)
ALP SERPL-CCNC: 93 IU/L (ref 44–121)
ALT SERPL-CCNC: 18 IU/L (ref 0–32)
AST SERPL-CCNC: 19 IU/L (ref 0–40)
BILIRUB SERPL-MCNC: 0.6 MG/DL (ref 0–1.2)
BUN SERPL-MCNC: 13 MG/DL (ref 8–27)
BUN/CREAT SERPL: 14 (ref 12–28)
CALCIUM SERPL-MCNC: 9.6 MG/DL (ref 8.7–10.3)
CHLORIDE SERPL-SCNC: 101 MMOL/L (ref 96–106)
CHOLEST SERPL-MCNC: 182 MG/DL (ref 100–199)
CO2 SERPL-SCNC: 27 MMOL/L (ref 20–29)
CREAT SERPL-MCNC: 0.9 MG/DL (ref 0.57–1)
EGFR: 71 ML/MIN/1.73
EST. AVERAGE GLUCOSE BLD GHB EST-MCNC: 194 MG/DL
GLOBULIN SER CALC-MCNC: 2.2 G/DL (ref 1.5–4.5)
GLUCOSE SERPL-MCNC: 150 MG/DL (ref 70–99)
HBA1C MFR BLD: 8.4 % (ref 4.8–5.6)
HDLC SERPL-MCNC: 59 MG/DL
IMP & REVIEW OF LAB RESULTS: NORMAL
LDLC SERPL CALC-MCNC: 103 MG/DL (ref 0–99)
POTASSIUM SERPL-SCNC: 4.3 MMOL/L (ref 3.5–5.2)
PROT SERPL-MCNC: 6.6 G/DL (ref 6–8.5)
SODIUM SERPL-SCNC: 142 MMOL/L (ref 134–144)
T4 FREE SERPL-MCNC: 1.09 NG/DL (ref 0.82–1.77)
TRIGL SERPL-MCNC: 115 MG/DL (ref 0–149)
TSH SERPL DL<=0.005 MIU/L-ACNC: 10.2 UIU/ML (ref 0.45–4.5)
VLDLC SERPL CALC-MCNC: 20 MG/DL (ref 5–40)

## 2022-10-29 LAB — HEMOCCULT STL QL IA: NEGATIVE

## 2022-11-02 ENCOUNTER — TELEPHONE (OUTPATIENT)
Dept: FAMILY MEDICINE CLINIC | Age: 62
End: 2022-11-02

## 2022-11-02 DIAGNOSIS — N95.1 MENOPAUSE SYNDROME: Primary | ICD-10-CM

## 2022-11-03 ENCOUNTER — TELEPHONE (OUTPATIENT)
Dept: FAMILY MEDICINE CLINIC | Age: 62
End: 2022-11-03

## 2022-11-03 DIAGNOSIS — E11.9 CONTROLLED TYPE 2 DIABETES MELLITUS WITHOUT COMPLICATION, WITH LONG-TERM CURRENT USE OF INSULIN (HCC): ICD-10-CM

## 2022-11-03 DIAGNOSIS — Z79.4 CONTROLLED TYPE 2 DIABETES MELLITUS WITHOUT COMPLICATION, WITH LONG-TERM CURRENT USE OF INSULIN (HCC): ICD-10-CM

## 2022-11-03 DIAGNOSIS — E03.9 ACQUIRED HYPOTHYROIDISM: Primary | ICD-10-CM

## 2022-11-03 DIAGNOSIS — E11.9 TYPE 2 DIABETES MELLITUS WITHOUT COMPLICATION, WITHOUT LONG-TERM CURRENT USE OF INSULIN (HCC): ICD-10-CM

## 2022-11-03 RX ORDER — LEVOTHYROXINE SODIUM 125 UG/1
125 TABLET ORAL
Qty: 90 TABLET | Refills: 1 | Status: SHIPPED | OUTPATIENT
Start: 2022-11-03

## 2022-11-03 RX ORDER — INSULIN GLARGINE 100 [IU]/ML
30 INJECTION, SOLUTION SUBCUTANEOUS
Qty: 15 ML | Refills: 3 | Status: SHIPPED | OUTPATIENT
Start: 2022-11-03

## 2022-11-04 ENCOUNTER — TELEPHONE (OUTPATIENT)
Dept: FAMILY MEDICINE CLINIC | Age: 62
End: 2022-11-04

## 2022-11-04 NOTE — TELEPHONE ENCOUNTER
Increase Lantus 30 units daily. Increase Levothyroxine 125 mcg tab daily. Refills sent to [facility].

## 2022-11-04 NOTE — TELEPHONE ENCOUNTER
----- Message from Keisha Fuentes MD sent at 11/3/2022  9:44 PM EDT -----  A1C is higher at 8.4. Average sugar is about 194. Need increase Lantus to 30 units daily. Abnormal thyroid level - too low. Have you missed doses? I want to increase dose of Levothyroxine. Cholesterol numbers much better. Continue on statin. Good kidney and liver tests. Negative stool test for blood.

## 2022-11-04 NOTE — PROGRESS NOTES
A1C is higher at 8.4. Average sugar is about 194. Need increase Lantus to 30 units daily. Abnormal thyroid level - too low. Have you missed doses? I want to increase dose of Levothyroxine. Cholesterol numbers much better. Continue on statin. Good kidney and liver tests. Negative stool test for blood.

## 2022-12-24 ENCOUNTER — HOSPITAL ENCOUNTER (EMERGENCY)
Age: 62
Discharge: HOME | End: 2022-12-24
Attending: STUDENT IN AN ORGANIZED HEALTH CARE EDUCATION/TRAINING PROGRAM
Payer: MEDICAID

## 2022-12-24 VITALS
RESPIRATION RATE: 16 BRPM | BODY MASS INDEX: 35.55 KG/M2 | SYSTOLIC BLOOD PRESSURE: 162 MMHG | TEMPERATURE: 98.9 F | HEIGHT: 63 IN | OXYGEN SATURATION: 98 % | HEART RATE: 89 BPM | DIASTOLIC BLOOD PRESSURE: 84 MMHG | WEIGHT: 200.62 LBS

## 2022-12-24 DIAGNOSIS — M77.9 TENDONITIS: Primary | ICD-10-CM

## 2022-12-24 PROCEDURE — 74011250636 HC RX REV CODE- 250/636: Performed by: FAMILY MEDICINE

## 2022-12-24 PROCEDURE — 96372 THER/PROPH/DIAG INJ SC/IM: CPT

## 2022-12-24 PROCEDURE — 99284 EMERGENCY DEPT VISIT MOD MDM: CPT

## 2022-12-24 RX ORDER — KETOROLAC TROMETHAMINE 30 MG/ML
30 INJECTION, SOLUTION INTRAMUSCULAR; INTRAVENOUS
Status: COMPLETED | OUTPATIENT
Start: 2022-12-24 | End: 2022-12-24

## 2022-12-24 RX ORDER — DICLOFENAC POTASSIUM 50 MG/1
50 TABLET, FILM COATED ORAL 3 TIMES DAILY
Qty: 15 TABLET | Refills: 0 | Status: SHIPPED | OUTPATIENT
Start: 2022-12-24 | End: 2022-12-29

## 2022-12-24 RX ADMIN — KETOROLAC TROMETHAMINE 30 MG: 30 INJECTION, SOLUTION INTRAMUSCULAR; INTRAVENOUS at 17:05

## 2022-12-24 NOTE — ED TRIAGE NOTES
Pt presents to ED with c/o left lower leg pain along Tamarack's tendon for one month. She denies hx of trauma. Pt states she was dancing today and felt pop with increasing pain. Pt is ambulatory to treatment area.

## 2022-12-24 NOTE — ED NOTES
The patient was discharged home by Dr Eitan Oliveira in stable condition. The patient is alert and oriented, in no respiratory distress . The patient's diagnosis, condition and treatment were explained. The patient expressed understanding. Prescriptions given/e-scribed to pharmacy. No work/school note given. A discharge plan has been developed. A  was not involved in the process. Aftercare instructions were given. Pt ambulatory out of the ED.

## 2022-12-24 NOTE — ED PROVIDER NOTES
Patient is a a 77-year-old female with past medical history of diabetes, depression, hypothyroidism, migraines presenting for evaluation of left lower leg pain. 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 Has been taking acetaminophen intermittently at home for the pain. Today, was dancing and felt that the pain acutely worsened, feeling a pop in her leg. Reports that she has been able to ambulate without difficulty. Has not noticed any swelling. No other complaints.              Past Medical History:   Diagnosis Date    COVID-19 2022    Cyst (solitary) of breast, left 2021    epidermal inclusion    Depression     Diabetes ([facility] [address]     Fluid retention     Hypothyroidism     Migraine headache        Past Surgical History:   Procedure Laterality Date    HX  SECTION      in 10 and 24    HX CHOLECYSTECTOMY           Family History:   Problem Relation Age of Onset    No Known Problems Mother     Heart Failure Father        Social History     Socioeconomic History    Marital status:      Spouse name: Not on file    Number of children: Not on file    Years of education: Not on file    Highest education level: Not on file   Occupational History    Not on file   Tobacco Use    Smoking status: Never    Smokeless tobacco: Never   Vaping Use    Vaping Use: Never used   Substance and Sexual Activity    Alcohol use: Yes     Comment: very seldom    Drug use: No     Comment: cbd gummies     Sexual activity: Yes     Partners: Male   Other Topics Concern    Not on file   Social History Narrative    Not on file     Social Determinants of Health     Financial Resource Strain: Low Risk     Difficulty of Paying Living Expenses: Not hard at all   Food Insecurity: No Food Insecurity    Worried About Running Out of Food in the Last Year: Never true    Ran Out of Food in the Last Year: Never true   Transportation Needs: Not on file   Physical Activity: Not on file   Stress: Not on file Social Connections: Not on file   Intimate Partner Violence: Not on file   Housing Stability: Not on file         ALLERGIES: Metformin    Review of Systems   Constitutional:  Negative for unexpected weight change. HENT:  Negative for congestion. Eyes:  Negative for visual disturbance. Respiratory:  Negative for cough, chest tightness and shortness of breath. Cardiovascular:  Negative for chest pain and palpitations. Gastrointestinal:  Negative for abdominal pain, nausea and vomiting. Endocrine: Negative for polyuria. Genitourinary:  Negative for dyspareunia, dysuria and flank pain. Musculoskeletal:  Positive for arthralgias. Skin:  Negative for color change. Allergic/Immunologic: Negative for immunocompromised state. Neurological:  Negative for dizziness and headaches. Hematological:  Negative for adenopathy. Psychiatric/Behavioral:  Negative for agitation. Vitals:    12/24/22 1634   BP: (!) 162/84   Pulse: 89   Resp: 16   Temp: 98.9 °F (37.2 °C)   SpO2: 98%   Weight: 91 kg (200 lb 9.9 oz)   Height: 5' 3\" (1.6 m)            Physical Exam  Vitals and nursing note reviewed. Constitutional:       General: She is not in acute distress. Appearance: Normal appearance. She is normal weight. HENT:      Head: Atraumatic. Eyes:      Conjunctiva/sclera: Conjunctivae normal.      Pupils: Pupils are equal, round, and reactive to light. Cardiovascular:      Rate and Rhythm: Normal rate. Pulmonary:      Effort: Pulmonary effort is normal. No respiratory distress. Abdominal:      General: Abdomen is flat. Bowel sounds are normal.   Musculoskeletal:         General: Normal range of motion. Cervical back: Neck supple. Right lower leg: No edema. Left lower leg: No edema. Comments: No soft tissue swelling to left lower extremity. Achilles tendon intact via Donato's test.  No erythema. Pain with dorsi flexion of foot. Neurovascularly intact.    Skin:     General: Skin is warm and dry. Capillary Refill: Capillary refill takes less than 2 seconds. Neurological:      General: No focal deficit present. Mental Status: She is alert and oriented to person, place, and time. Mental status is at baseline. Psychiatric:         Mood and Affect: Mood normal.         Behavior: Behavior normal.        MDM  Number of Diagnoses or Management Options  Tendonitis  Diagnosis management comments: Patient presenting with left lower leg pain status post stenting. History of frequent dancing and use of leg. Physical exam is unremarkable with negative Donato test, no soft tissue swelling, no erythema. Very low clinical suspicion for acute fracture, DVT, or torn Achilles tendon. She is ambulating without difficulty. I suspect that based on her reported symptoms and physical exam that she is suffering from a tendinitis. We will treat patient today with dose of IM ketorolac. Will prescribe short course of diclofenac. Educated on rest and ice Therapy at home. Follow-up with orthopedics. Discussed my clinical impression(s), any labs and/or radiology results with the patient. I answered any questions and addressed any concerns. Discussed the importance of following up with their primary care physician and/or specialist(s). Discussed signs or symptoms that would warrant return back to the ER for further evaluation. The patient is agreeable with discharge.     Patient Progress  Patient progress: stable         Procedures

## 2023-01-11 ENCOUNTER — APPOINTMENT (OUTPATIENT)
Dept: FAMILY MEDICINE CLINIC | Age: 63
End: 2023-01-11

## 2023-01-11 ENCOUNTER — OFFICE VISIT (OUTPATIENT)
Dept: FAMILY MEDICINE CLINIC | Age: 63
End: 2023-01-11
Payer: MEDICAID

## 2023-01-11 VITALS
WEIGHT: 196 LBS | HEIGHT: 63 IN | HEART RATE: 87 BPM | BODY MASS INDEX: 34.73 KG/M2 | DIASTOLIC BLOOD PRESSURE: 70 MMHG | RESPIRATION RATE: 18 BRPM | TEMPERATURE: 97.8 F | SYSTOLIC BLOOD PRESSURE: 146 MMHG | OXYGEN SATURATION: 98 %

## 2023-01-11 DIAGNOSIS — M77.9 TENDONITIS: ICD-10-CM

## 2023-01-11 DIAGNOSIS — Z79.4 CONTROLLED TYPE 2 DIABETES MELLITUS WITHOUT COMPLICATION, WITH LONG-TERM CURRENT USE OF INSULIN (HCC): Primary | ICD-10-CM

## 2023-01-11 DIAGNOSIS — E11.9 CONTROLLED TYPE 2 DIABETES MELLITUS WITHOUT COMPLICATION, WITH LONG-TERM CURRENT USE OF INSULIN (HCC): Primary | ICD-10-CM

## 2023-01-11 PROCEDURE — 3051F HG A1C>EQUAL 7.0%<8.0%: CPT | Performed by: FAMILY MEDICINE

## 2023-01-11 PROCEDURE — 99212 OFFICE O/P EST SF 10 MIN: CPT | Performed by: FAMILY MEDICINE

## 2023-01-11 NOTE — PROGRESS NOTES
Identified pt with two pt identifiers(name and ).     Chief Complaint   Patient presents with    Hospital Follow Up     2022 for tendonitis left leg        Health Maintenance Due   Topic    DTaP/Tdap/Td series (1 - Tdap)    Cervical cancer screen     Shingles Vaccine (1 of 2)    Breast Cancer Screen Mammogram     Flu Vaccine (1)    Bone Densitometry (Dexa) Screening        Wt Readings from Last 3 Encounters:   23 196 lb (88.9 kg)   22 200 lb 9.9 oz (91 kg)   10/24/22 198 lb (89.8 kg)     Temp Readings from Last 3 Encounters:   23 97.8 °F (36.6 °C) (Temporal)   22 98.9 °F (37.2 °C)   10/24/22 98.1 °F (36.7 °C) (Temporal)     BP Readings from Last 3 Encounters:   23 (!) 146/70   22 (!) 162/84   10/24/22 (!) 144/80     Pulse Readings from Last 3 Encounters:   23 87   22 89   10/24/22 74         Learning Assessment:  :     Learning Assessment 2021 10/24/2019   PRIMARY LEARNER Patient Patient   BARRIERS PRIMARY LEARNER NONE -   CO-LEARNER CAREGIVER No -   PRIMARY LANGUAGE ENGLISH ENGLISH   LEARNER PREFERENCE PRIMARY LISTENING DEMONSTRATION     DEMONSTRATION -   ANSWERED BY patient self   RELATIONSHIP SELF SELF       Depression Screening:  :     3 most recent PHQ Screens 2023   PHQ Not Done -   Little interest or pleasure in doing things Not at all   Feeling down, depressed, irritable, or hopeless Not at all   Total Score PHQ 2 0   Trouble falling or staying asleep, or sleeping too much -   Feeling tired or having little energy -   Poor appetite, weight loss, or overeating -   Feeling bad about yourself - or that you are a failure or have let yourself or your family down -   Trouble concentrating on things such as school, work, reading, or watching TV -   Moving or speaking so slowly that other people could have noticed; or the opposite being so fidgety that others notice -   Thoughts of being better off dead, or hurting yourself in some way -   PHQ 9 Score -   How difficult have these problems made it for you to do your work, take care of your home and get along with others -       Fall Risk Assessment:  :     No flowsheet data found. Abuse Screening:  :     Abuse Screening Questionnaire 1/11/2023 10/24/2022 6/16/2022 12/27/2021 9/14/2021 7/7/2021 2/25/2021   Do you ever feel afraid of your partner? N N N N N N N   Are you in a relationship with someone who physically or mentally threatens you? N N N N N N N   Is it safe for you to go home? Y Y Y Y Y Y Y       Coordination of Care Questionnaire:  :     1) Have you been to an emergency room, urgent care clinic since your last visit? yes [employer] 12/24/2022 for tendonitis  Hospitalized since your last visit? no             2) Have you seen or consulted any other health care providers outside of [address] [place] [address] since your last visit? no  (Include any pap smears or colon screenings in this section.)    3) Do you have an Advance Directive on file? yes  Are you interested in receiving information about Advance Directives? no    Patient is accompanied by self I have received verbal consent from Amy Knowels to discuss any/all medical information while they are present in the room. 4.  For patients aged 39-70: Has the patient had a colonoscopy / FIT/ Cologuard? No      If the patient is female:    5. For patients aged 41-77: Has the patient had a mammogram within the past 2 years? No      6. For patients aged 21-65: Has the patient had a pap smear?  No

## 2023-01-12 DIAGNOSIS — N95.1 MENOPAUSE SYNDROME: ICD-10-CM

## 2023-01-12 LAB
EST. AVERAGE GLUCOSE BLD GHB EST-MCNC: 180 MG/DL
HBA1C MFR BLD: 7.9 % (ref 4.8–5.6)

## 2023-01-12 NOTE — PROGRESS NOTES
Elvin Benitez (: 1960) is a 57 y.o. female, established patient, here for evaluation of the following chief complaint(s):  Hospital Follow Up (2022 for tendonitis left leg)       ASSESSMENT/PLAN:  Below is the assessment and plan developed based on review of pertinent history, physical exam, labs, studies, and medications. 1. Controlled type 2 diabetes mellitus without complication, with long-term current use of insulin (HCC)  -     HEMOGLOBIN A1C WITH EAG; Future  -     REFERRAL TO PHYSICAL THERAPY  2. Tendonitis  -     REFERRAL TO PHYSICAL THERAPY  -     REFERRAL TO PHYSICAL THERAPY      No follow-ups on file. SUBJECTIVE/OBJECTIVE:  Tendonitis  Diagnosis management comments: Patient presenting with left lower leg pain status post stenting. History of frequent dancing and use of leg. Physical exam is unremarkable with negative Donato test, no soft tissue swelling, no erythema. Very low clinical suspicion for acute fracture, DVT, or torn Achilles tendon. She is ambulating without difficulty. I suspect that based on her reported symptoms and physical exam that she is suffering from a tendinitis. We will treat patient today with dose of IM ketorolac. Will prescribe short course of diclofenac. Educated on rest and ice Therapy at home. Follow-up with orthopedics. Discussed my clinical impression(s), any labs and/or radiology results with the patient. I answered any questions and addressed any concerns. Discussed the importance of following up with their primary care physician and/or specialist(s). Discussed signs or symptoms that would warrant return back to the ER for further evaluation. The patient is agreeable with discharge. Above notation is from patients ER visit on 22. She states that she is feeling better, is still having some pain, is able to tolerate without pain medications. Would like to try physical therapy.      Is also a diabetic, states that her blood sugar has been in goal, is eating well, following a diabetic diet, is complaint with her medications as well. Review of Systems   Constitutional:  Negative for activity change, appetite change, fatigue and fever. HENT: Negative. Eyes: Negative. Respiratory: Negative. Cardiovascular: Negative. Gastrointestinal: Negative. Endocrine: Negative. Genitourinary: Negative. Musculoskeletal:  Positive for gait problem. Negative for back pain, joint swelling and myalgias. Skin: Negative. Allergic/Immunologic: Negative. Hematological: Negative. Psychiatric/Behavioral: Negative. Physical Exam  Vitals reviewed. Constitutional:       Appearance: She is normal weight. HENT:      Head: Normocephalic and atraumatic. Right Ear: External ear normal.      Left Ear: External ear normal.      Nose: Nose normal.      Mouth/Throat:      Mouth: Mucous membranes are moist.      Pharynx: Oropharynx is clear. Eyes:      Extraocular Movements: Extraocular movements intact. Conjunctiva/sclera: Conjunctivae normal.      Pupils: Pupils are equal, round, and reactive to light. Cardiovascular:      Rate and Rhythm: Normal rate and regular rhythm. Pulses: Normal pulses. Heart sounds: Normal heart sounds. Pulmonary:      Effort: Pulmonary effort is normal.      Breath sounds: Normal breath sounds. Abdominal:      General: Abdomen is flat. Bowel sounds are normal.      Palpations: Abdomen is soft. Musculoskeletal:         General: Normal range of motion. Cervical back: Normal range of motion and neck supple. Right lower leg: No swelling or tenderness. Left lower leg: Swelling present. No tenderness. Skin:     General: Skin is warm. Capillary Refill: Capillary refill takes less than 2 seconds. Neurological:      General: No focal deficit present. Mental Status: She is alert and oriented to person, place, and time. Mental status is at baseline. Psychiatric:         Mood and Affect: Mood normal.         Behavior: Behavior normal.         Thought Content: Thought content normal.         Judgment: Judgment normal.           An electronic signature was used to authenticate this note.   -- Chinedu Rebollar MD

## 2023-01-24 DIAGNOSIS — F32.A DEPRESSION, UNSPECIFIED DEPRESSION TYPE: ICD-10-CM

## 2023-01-25 RX ORDER — ESCITALOPRAM OXALATE 20 MG/1
TABLET ORAL
Qty: 30 TABLET | Refills: 5 | Status: SHIPPED | OUTPATIENT
Start: 2023-01-25

## 2023-03-06 ENCOUNTER — TELEPHONE (OUTPATIENT)
Dept: FAMILY MEDICINE CLINIC | Age: 63
End: 2023-03-06

## 2023-03-06 DIAGNOSIS — M76.62 ACHILLES TENDONITIS, BILATERAL: Primary | ICD-10-CM

## 2023-03-06 DIAGNOSIS — M76.61 ACHILLES TENDONITIS, BILATERAL: Primary | ICD-10-CM

## 2023-03-06 NOTE — TELEPHONE ENCOUNTER
----- Message from Neelima Lexis sent at 3/3/2023  4:15 PM EST -----  Subject: Referral Request    Reason for referral request? Patient is requesting an ammendment to her   original Physical Therapy Referral. She is requesting BOTH legs to be   treated for Achilles Tendonitis. Please advise. Thank you   Provider patient wants to be referred to(if known):     Provider Phone Number(if known):     Additional Information for Provider?   ---------------------------------------------------------------------------  --------------  [id number]    [phone]; OK to leave message on voicemail  ---------------------------------------------------------------------------  --------------

## 2023-03-08 ENCOUNTER — TELEPHONE (OUTPATIENT)
Dept: FAMILY MEDICINE CLINIC | Age: 63
End: 2023-03-08

## 2023-03-28 ENCOUNTER — APPOINTMENT (OUTPATIENT)
Dept: PHYSICAL THERAPY | Age: 63
End: 2023-03-28

## 2023-03-29 ENCOUNTER — OFFICE VISIT (OUTPATIENT)
Dept: FAMILY MEDICINE CLINIC | Age: 63
End: 2023-03-29
Payer: MEDICAID

## 2023-03-29 VITALS
SYSTOLIC BLOOD PRESSURE: 150 MMHG | HEIGHT: 63 IN | RESPIRATION RATE: 18 BRPM | HEART RATE: 90 BPM | DIASTOLIC BLOOD PRESSURE: 78 MMHG | OXYGEN SATURATION: 94 % | WEIGHT: 200 LBS | BODY MASS INDEX: 35.44 KG/M2 | TEMPERATURE: 98.1 F

## 2023-03-29 DIAGNOSIS — R19.7 DIARRHEA, UNSPECIFIED TYPE: Primary | ICD-10-CM

## 2023-03-29 DIAGNOSIS — E78.00 HYPERCHOLESTEROLEMIA: ICD-10-CM

## 2023-03-29 DIAGNOSIS — R60.9 FLUID RETENTION: ICD-10-CM

## 2023-03-29 DIAGNOSIS — E66.01 SEVERE OBESITY (BMI 35.0-39.9) WITH COMORBIDITY (HCC): ICD-10-CM

## 2023-03-29 DIAGNOSIS — E03.9 ACQUIRED HYPOTHYROIDISM: ICD-10-CM

## 2023-03-29 DIAGNOSIS — E11.9 TYPE 2 DIABETES MELLITUS WITHOUT COMPLICATION, WITHOUT LONG-TERM CURRENT USE OF INSULIN (HCC): ICD-10-CM

## 2023-03-29 PROCEDURE — 1123F ACP DISCUSS/DSCN MKR DOCD: CPT | Performed by: FAMILY MEDICINE

## 2023-03-29 PROCEDURE — 99214 OFFICE O/P EST MOD 30 MIN: CPT | Performed by: FAMILY MEDICINE

## 2023-03-29 PROCEDURE — 3051F HG A1C>EQUAL 7.0%<8.0%: CPT | Performed by: FAMILY MEDICINE

## 2023-03-29 RX ORDER — INSULIN GLARGINE 100 [IU]/ML
30 INJECTION, SOLUTION SUBCUTANEOUS
Qty: 15 ML | Refills: 3 | Status: SHIPPED | OUTPATIENT
Start: 2023-03-29

## 2023-03-29 RX ORDER — HYDROCHLOROTHIAZIDE 25 MG/1
25 TABLET ORAL DAILY
Qty: 90 TABLET | Refills: 1 | Status: SHIPPED | OUTPATIENT
Start: 2023-03-29

## 2023-03-29 RX ORDER — ATORVASTATIN CALCIUM 20 MG/1
20 TABLET, FILM COATED ORAL
Qty: 90 TABLET | Refills: 1 | Status: SHIPPED | OUTPATIENT
Start: 2023-03-29

## 2023-03-29 NOTE — PROGRESS NOTES
Identified pt with two pt identifiers(name and ).     Chief Complaint   Patient presents with    Diarrhea     Started years ago  Getting worse    Constipation     Started years ago  Getting worse    Nausea     Started a few weeks ago        Health Maintenance Due   Topic    Pneumococcal 65+ years (1 - PCV)    DTaP/Tdap/Td series (1 - Tdap)    Cervical cancer screen     Shingles Vaccine (1 of 2)    Breast Cancer Screen Mammogram     Flu Vaccine (1)    Bone Densitometry (Dexa) Screening        Wt Readings from Last 3 Encounters:   23 200 lb (90.7 kg)   23 196 lb (88.9 kg)   22 200 lb 9.9 oz (91 kg)     Temp Readings from Last 3 Encounters:   23 98.1 °F (36.7 °C) (Temporal)   23 97.8 °F (36.6 °C) (Temporal)   22 98.9 °F (37.2 °C)     BP Readings from Last 3 Encounters:   23 (!) 150/78   23 (!) 146/70   22 (!) 162/84     Pulse Readings from Last 3 Encounters:   23 90   23 87   22 89         Learning Assessment:  :     Learning Assessment 2021 10/24/2019   PRIMARY LEARNER Patient Patient   BARRIERS PRIMARY LEARNER NONE -   CO-LEARNER CAREGIVER No -   PRIMARY LANGUAGE ENGLISH ENGLISH   LEARNER PREFERENCE PRIMARY LISTENING DEMONSTRATION     DEMONSTRATION -   ANSWERED BY patient self   RELATIONSHIP SELF SELF       Depression Screening:  :     3 most recent PHQ Screens 3/29/2023   PHQ Not Done -   Little interest or pleasure in doing things Several days   Feeling down, depressed, irritable, or hopeless Several days   Total Score PHQ 2 2   Trouble falling or staying asleep, or sleeping too much -   Feeling tired or having little energy -   Poor appetite, weight loss, or overeating -   Feeling bad about yourself - or that you are a failure or have let yourself or your family down -   Trouble concentrating on things such as school, work, reading, or watching TV -   Moving or speaking so slowly that other people could have noticed; or the opposite being so fidgety that others notice -   Thoughts of being better off dead, or hurting yourself in some way -   PHQ 9 Score -   How difficult have these problems made it for you to do your work, take care of your home and get along with others -       Fall Risk Assessment:  :     Fall Risk Assessment, last 12 mths 3/29/2023   Able to walk? Yes   Fall in past 12 months? 0   Do you feel unsteady? 0   Are you worried about falling 0       Abuse Screening:  :     Abuse Screening Questionnaire 3/29/2023 1/11/2023 10/24/2022 6/16/2022 12/27/2021 9/14/2021 7/7/2021   Do you ever feel afraid of your partner? N N N N N N N   Are you in a relationship with someone who physically or mentally threatens you? N N N N N N N   Is it safe for you to go home? Y Y Y Y Y Y Y       Coordination of Care Questionnaire:  :     1) Have you been to an emergency room, urgent care clinic since your last visit? yes Tendonitis at [facility] since your last visit? no             2) Have you seen or consulted any other health care providers outside of [address] [place] [address] since your last visit? no  (Include any pap smears or colon screenings in this section.)    3) Do you have an Advance Directive on file? yes  Are you interested in receiving information about Advance Directives? no    Patient is accompanied by self I have received verbal consent from Sofia Barber to discuss any/all medical information while they are present in the room. 4.  For patients aged 39-70: Has the patient had a colonoscopy / FIT/ Cologuard? Yes - no Care Gap present      If the patient is female:    5. For patients aged 41-77: Has the patient had a mammogram within the past 2 years? No      6. For patients aged 21-65: Has the patient had a pap smear?  NA - based on age or sex

## 2023-03-30 ENCOUNTER — APPOINTMENT (OUTPATIENT)
Dept: FAMILY MEDICINE CLINIC | Age: 63
End: 2023-03-30

## 2023-03-31 LAB
ALBUMIN SERPL-MCNC: 4.3 G/DL (ref 3.8–4.8)
ALBUMIN/GLOB SERPL: 2 {RATIO} (ref 1.2–2.2)
ALP SERPL-CCNC: 82 IU/L (ref 44–121)
ALT SERPL-CCNC: 20 IU/L (ref 0–32)
AST SERPL-CCNC: 19 IU/L (ref 0–40)
BASOPHILS # BLD AUTO: 0.1 X10E3/UL (ref 0–0.2)
BASOPHILS NFR BLD AUTO: 1 %
BILIRUB SERPL-MCNC: 0.5 MG/DL (ref 0–1.2)
BUN SERPL-MCNC: 13 MG/DL (ref 8–27)
BUN/CREAT SERPL: 13 (ref 12–28)
CALCIUM SERPL-MCNC: 9.7 MG/DL (ref 8.7–10.3)
CHLORIDE SERPL-SCNC: 101 MMOL/L (ref 96–106)
CHOLEST SERPL-MCNC: 188 MG/DL (ref 100–199)
CO2 SERPL-SCNC: 27 MMOL/L (ref 20–29)
CREAT SERPL-MCNC: 0.98 MG/DL (ref 0.57–1)
EGFRCR SERPLBLD CKD-EPI 2021: 64 ML/MIN/1.73
EOSINOPHIL # BLD AUTO: 0.2 X10E3/UL (ref 0–0.4)
EOSINOPHIL NFR BLD AUTO: 3 %
ERYTHROCYTE [DISTWIDTH] IN BLOOD BY AUTOMATED COUNT: 13.2 % (ref 11.7–15.4)
GLOBULIN SER CALC-MCNC: 2.1 G/DL (ref 1.5–4.5)
GLUCOSE SERPL-MCNC: 98 MG/DL (ref 70–99)
HCT VFR BLD AUTO: 44.5 % (ref 34–46.6)
HDLC SERPL-MCNC: 64 MG/DL
HGB BLD-MCNC: 15.6 G/DL (ref 11.1–15.9)
IMM GRANULOCYTES # BLD AUTO: 0 X10E3/UL (ref 0–0.1)
IMM GRANULOCYTES NFR BLD AUTO: 0 %
IMP & REVIEW OF LAB RESULTS: NORMAL
LDLC SERPL CALC-MCNC: 105 MG/DL (ref 0–99)
LYMPHOCYTES # BLD AUTO: 1.4 X10E3/UL (ref 0.7–3.1)
LYMPHOCYTES NFR BLD AUTO: 25 %
MCH RBC QN AUTO: 31 PG (ref 26.6–33)
MCHC RBC AUTO-ENTMCNC: 35.1 G/DL (ref 31.5–35.7)
MCV RBC AUTO: 88 FL (ref 79–97)
MONOCYTES # BLD AUTO: 0.6 X10E3/UL (ref 0.1–0.9)
MONOCYTES NFR BLD AUTO: 10 %
NEUTROPHILS # BLD AUTO: 3.4 X10E3/UL (ref 1.4–7)
NEUTROPHILS NFR BLD AUTO: 61 %
PLATELET # BLD AUTO: 333 X10E3/UL (ref 150–450)
POTASSIUM SERPL-SCNC: 4.4 MMOL/L (ref 3.5–5.2)
PROT SERPL-MCNC: 6.4 G/DL (ref 6–8.5)
RBC # BLD AUTO: 5.04 X10E6/UL (ref 3.77–5.28)
SODIUM SERPL-SCNC: 143 MMOL/L (ref 134–144)
T4 FREE SERPL-MCNC: 1.11 NG/DL (ref 0.82–1.77)
TRIGL SERPL-MCNC: 109 MG/DL (ref 0–149)
TSH SERPL DL<=0.005 MIU/L-ACNC: 12 UIU/ML (ref 0.45–4.5)
VLDLC SERPL CALC-MCNC: 19 MG/DL (ref 5–40)
WBC # BLD AUTO: 5.6 X10E3/UL (ref 3.4–10.8)

## 2023-03-31 NOTE — PROGRESS NOTES
HISTORY OF PRESENT ILLNESS  Saman Matos is a 70 y.o. female. HPI  C/O diarrhea since Monday afternoon. Last weekend she started feeling bad. Had shakes, sweats. Watery and frequent stools. No blood. Yesterday no BM. Today normal stool. Slight tender lower abdomen. ROS  Visit Vitals  BP (!) 150/78 (BP 1 Location: Left arm, BP Patient Position: Sitting, BP Cuff Size: Adult)   Pulse 90   Temp 98.1 °F (36.7 °C) (Temporal)   Resp 18   Ht 5' 3\" (1.6 m)   Wt 200 lb (90.7 kg)   SpO2 94%   BMI 35.43 kg/m²       Physical Exam    ASSESSMENT and PLAN    ICD-10-CM ICD-9-CM    1. Diarrhea, unspecified type  C37.2 382.36 METABOLIC PANEL, COMPREHENSIVE      CBC WITH AUTOMATED DIFF      ENTERIC BACTERIA PANEL, DNA      OVA+PARASITE + GIARDIA      METABOLIC PANEL, COMPREHENSIVE      CBC WITH AUTOMATED DIFF      OVA+PARASITE + GIARDIA      2. Acquired hypothyroidism  E03.9 244.9 TSH 3RD GENERATION      T4, FREE      TSH 3RD GENERATION      T4, FREE      3. Severe obesity (BMI 35.0-39. 9) with comorbidity ([facility] [address]  E66.01 278.01       4. Hypercholesterolemia  E78.00 272.0 atorvastatin (LIPITOR) 20 mg tablet      LIPID PANEL      LIPID PANEL      5. Type 2 diabetes mellitus without complication, without long-term current use of insulin ([facility])  E11.9 250.00 insulin glargine (Lantus Solostar U-100 Insulin) 100 unit/mL (3 mL) inpn      6. Fluid retention  R60.9 276.69 hydroCHLOROthiazide (HYDRODIURIL) 25 mg tablet      Order labs. Stool tests. Med refills sent. [place] diet. Rehydrate well by drinking plenty of fluids.

## 2023-04-02 NOTE — PROGRESS NOTES
Abnormal thyroid test. Very low. Is she taking Levothyroxine daily on empty stomach? If so, I need to raise dose again. Let me know. All other labs are fine. Good cholesterol. Normal sugar, kidney, and liver tests. Normal blood cells.

## 2023-04-03 ENCOUNTER — TELEPHONE (OUTPATIENT)
Dept: FAMILY MEDICINE CLINIC | Age: 63
End: 2023-04-03

## 2023-04-03 DIAGNOSIS — R19.7 DIARRHEA, UNSPECIFIED TYPE: ICD-10-CM

## 2023-04-03 NOTE — TELEPHONE ENCOUNTER
Called patient and left message on VM letting her know that call office for lab results and a few questions that we had.

## 2023-04-03 NOTE — TELEPHONE ENCOUNTER
----- Message from Abbie Deleon MD sent at 4/2/2023  3:36 PM EDT -----  Abnormal thyroid test. Very low. Is she taking Levothyroxine daily on empty stomach? If so, I need to raise dose again. Let me know. All other labs are fine. Good cholesterol. Normal sugar, kidney, and liver tests. Normal blood cells.

## 2023-04-05 ENCOUNTER — TELEPHONE (OUTPATIENT)
Dept: FAMILY MEDICINE CLINIC | Age: 63
End: 2023-04-05

## 2023-04-05 LAB
SPECIMEN/REQUEST PROBLEM, 100867: NORMAL
WRITTEN AUTHORIZATION, 977900: NORMAL

## 2023-04-05 NOTE — TELEPHONE ENCOUNTER
[facility]     Patient went to [facility] to try and do her enteric bacteria panel but they do not do the refrigerator white terrapak so they want to know if you would like them to perform a c-diff pack    [phone]

## 2023-04-06 NOTE — TELEPHONE ENCOUNTER
Called [facility] and they do not do the test that was ordered. They did complete test They need the enteric bacteria panel ordered as test [id number] and we can put in orange top container. I have container ready for patient to  when order is in. Other stool test was done.

## 2023-04-06 NOTE — TELEPHONE ENCOUNTER
Called patient and she is coming to  kit. Let her know to drop it off at office when it is complete. Room temp.

## 2023-04-07 ENCOUNTER — TELEPHONE (OUTPATIENT)
Dept: FAMILY MEDICINE CLINIC | Age: 63
End: 2023-04-07

## 2023-04-17 ENCOUNTER — TELEPHONE (OUTPATIENT)
Dept: FAMILY MEDICINE CLINIC | Age: 63
End: 2023-04-17

## 2023-04-17 NOTE — TELEPHONE ENCOUNTER
----- Message from Isha Cagle MD sent at 4/14/2023  9:50 PM EDT -----  Advise her stool tests are negative for bacteria.

## 2023-04-19 ENCOUNTER — HOSPITAL ENCOUNTER (OUTPATIENT)
Dept: PHYSICAL THERAPY | Age: 63
Discharge: HOME | End: 2023-04-19
Payer: MEDICAID

## 2023-04-19 PROCEDURE — 97162 PT EVAL MOD COMPLEX 30 MIN: CPT

## 2023-04-19 NOTE — THERAPY EVALUATION
Physical Therapy at [facility],   a part of  Sunita Parker  [address] [facility] Carlos Page  Phone: [phone]  Fax: [phone]    Plan of Care/ Statement of Necessity for Physical Therapy Services 2-15    Patient name: Jesus Bowden  : 1960  Provider#: [id number]  Referral source: Moshe Hunter MD      Medical/Treatment Diagnosis: Bilateral ankle pain [M25.571, M25.572]     Prior Hospitalization: see medical history     Comorbidities: DM, depression, hypothyroidism, HA  Prior Level of Function: Walking 1 mile 3x/day  Medications: Verified on Patient Summary List    Start of Care: 23      Onset Date:        The Plan of Care and following information is based on the information from the initial evaluation. Assessment/ key information: Patient is a pleasant 70year old female presenting with B ankle pain. L ankle with positive Donato test and palpable gap in [place]'s tendon, suggestive of tendon rupture. Patient was encouraged to follow up with imaging and orthopedic assessment of L ankle pain. R sided ankle pain consistent with tendonitis 2/2 compensatory overuse. Current symptoms limit functional ability to ambulate, maneuver stairs, or squat to the floor. Patient will benefit from skilled PT to address R sided ankle pain and orthopedic assessment for L sided pain. Evaluation Complexity History MEDIUM  Complexity : 1-2 comorbidities / personal factors will impact the outcome/ POC ; Examination MEDIUM Complexity : 3 Standardized tests and measures addressing body structure, function, activity limitation and / or participation in recreation  ;Presentation MEDIUM Complexity : Evolving with changing characteristics  ; Clinical Decision Making MEDIUM Complexity : FOTO score of 26-74  Overall Complexity Rating: MEDIUM    Problem List: pain affecting function, decrease ROM, decrease strength, impaired gait/ balance, decrease ADL/ functional abilitiies, decrease activity tolerance, decrease flexibility/ joint mobility, and decrease transfer abilities   Treatment Plan may include any combination of the following: Therapeutic exercise, Neuromuscular reeducation, Manual therapy, Therapeutic activity, Self care/home management, Electric stim unattended , Vasopneumatic device, Gait training, Ultrasound, Electric stim attended, and Needle insertion w/o injection (1 or 2 muscles)  Patient / Family readiness to learn indicated by: interest  Persons(s) to be included in education: patient (P)  Barriers to Learning/Limitations: None  Patient Goal (s): Be able to walk without pain or a limp and regain mobility  Patient Self Reported Health Status: good  Rehabilitation Potential: good    Short Term Goals: To be accomplished in 2 treatments:  Patient will be independent with initial HEP for R sided pain in order to transition to general wellness program.  Patient will demonstrate R SLS for 20 seconds or better to decrease fall risk when maneuvering on stairs. Long Term Goals: To be accomplished in 10 treatments:  Patient will demonstrate R SLS for 30 seconds or better to decrease fall risk when maneuvering on stairs. Patient will report worst pain on R side of 2/10 or better to increase QOL and tolerance for sleeping through the night. Patient will demonstrate no tenderness to palpation of the [place]'s tendon to normalize gait mechanics and ease community distance ambulation. Frequency / Duration: Patient to be seen 1-2 times per week for up to 12 weeks. Initial treatment will focus on R sided pain, adding in L as appropriate following orthopedic assessment.      Patient/ Caregiver education and instruction: self care, activity modification, brace/ splint application, and exercises    [x]  Plan of care has been reviewed with MEGHAN Hodges DPT 4/19/2023 ________________________________________________________________________    I certify that the above Therapy Services are being furnished while the patient is under my care. I agree with the treatment plan and certify that this therapy is necessary.     Physician's Signature:____________________  Date:____________Time: _________      Jodi Michaels MD

## 2023-04-19 NOTE — PROGRESS NOTES
PT INITIAL EVALUATION NOTE 2-15    Patient Name: Za Chacon  Date:2023  : 1960  [x]  Patient  Verified  Payor: BLUE CROSS MEDICAID / Plan: [facility] HEALTHKEEPERS PLUS / Product Type: Managed Care Medicaid /    In time:10:55 a  Out time:12:05  Total Treatment Time (min): 70  Visit #: 1     Treatment Area: Bilateral ankle pain [M25.571, M25.572]    SUBJECTIVE  Pain Level (0-10 scale): Current- 4, Best- 3, Worst- 9    Any medication changes, allergies to medications, adverse drug reactions, diagnosis change, or new procedure performed?: [] No    [x] Yes (see summary sheet for update)  Subjective:     Chief complaint: B ankle pain. Started having an aching on L in 2022 when she began walking more, then in 2022 felt and heard a loud pop on back of leg while dancing in the living room. Achilles tendonitis diagnosed at the ER, no imaging performed and she was referred to PT for tendonitis. She then went to [place] in January and she heard and felt the pop again with more pain. By the end of February she was sitting on the air plane home and the R started hurting. Pain has improved somewhat, the L feels very tight and the R is more painful. She has difficulty on stairs, walking and squatting. She has started having pain in the R knee. Pain is described as a dull aching pain, localized. Easing factors: Motrin, elevation, boots    Imaging/tests: none performed   Numbness/tingling: B neuropathy     PLOF: Walking 1 mile 3x/day  Mechanism of Injury: See above   Previous Treatment/Compliance: She has has previous PT for her shoulder, has treated current pain with medication   PMHx/Surgical Hx: DM, depression, hypothyroidism, HA  Work Hx: Not currently working   Living Situation: Stairs at home  Pt Goals: \"Be able to walk without pain or a limp and regain mobility. \"  Barriers: Chronicity   Motivation: Motivated   Substance use: None noted    Cognition: A & O x 4 OBJECTIVE/EXAMINATION  Gait assessment: L foot overpronation, L limp, decreased push off B, antalgic     Functional Mobility:    Squat: lacking DF forced ER and trunk flexion    Palpation: L ankle soft mid-Achilles section, R ankle mid Achilles and PTFL    Joint Mobility: hypomobile TC joint B     Sensation: Intact and equal bilaterally     MMT:      R L  Hip flexion  4+ 4+  Knee extensors 5 5  Knee flexors  5 5  Ankle DF  5 4+, p! In position   Ankle PF  5 4+, min p! Ankle INV  5 4+, p! Ankle EVR  5 5    AROM:        R L      Ankle DF  10 23  Ankle PF  52 35  Ankle INV  35 37  Ankle EVR  35 35     Balance:      R L  SLS   13 3, p! Special Tests:       Donato test: positive L, negative R      Modality rationale: decrease inflammation and decrease pain to improve the patients ability to ambulate, transfer, maneuver stairs, perform ADL's   Min Type Additional Details    [] Estim: []Att   []Unatt        []TENS instruct                  []IFC  []Premod   []NMES                     []Other:  []w/US   []w/ice   []w/heat  Position:  Location:    []  Traction: [] Cervical       []Lumbar                       [] Prone          []Supine                       []Intermittent   []Continuous Lbs:  [] before manual  [] after manual  []w/heat    []  Ultrasound: []Continuous   [] Pulsed at:                            []1MHz   []3MHz Location:  W/cm2:    []  Paraffin         Location:  []w/heat   15 [x]  Ice     []  Heat  []  Ice massage Position: seated  Location: B feet     []  Laser  []  Other: Position:  Location:    []  Vasopneumatic Device Pressure:       [] lo [] med [] hi   Temperature:    [x] Skin assessment post-treatment:  [x]intact []redness- no adverse reaction    []redness - adverse reaction:     5 min Therapeutic Exercise:  [x] See flow sheet :   Rationale: increase ROM and increase strength to improve the patients ability to ambulate, transfer, maneuver stairs, perform ADL's            With   [] TE [] TA   [] Neuro   [] SC   [] other: Patient Education: [x] Review HEP    [] Progressed/Changed HEP based on:   [] positioning   [] body mechanics   [] transfers   [] heat/ice application    [x] other: wearing supportive shoes       Other Objective/Functional Measures: FOTO Functional Measure: 49/100                  Pain Level (0-10 scale) post treatment: 3      ASSESSMENT:      [x]  See Plan of Elizabeth Hall 27, DPT 4/19/2023

## 2023-04-25 ENCOUNTER — HOSPITAL ENCOUNTER (OUTPATIENT)
Dept: PHYSICAL THERAPY | Age: 63
Discharge: HOME | End: 2023-04-25
Payer: MEDICAID

## 2023-04-25 PROCEDURE — 97016 VASOPNEUMATIC DEVICE THERAPY: CPT

## 2023-04-25 PROCEDURE — 97140 MANUAL THERAPY 1/> REGIONS: CPT

## 2023-04-25 PROCEDURE — 97110 THERAPEUTIC EXERCISES: CPT

## 2023-04-25 NOTE — PROGRESS NOTES
PT DAILY TREATMENT NOTE - [facility] 2-15    Patient Name: Brooke Shi  Date:2023  : 1960  [x]  Patient  Verified  Payor: BLUE CROSS MEDICAID / Plan: [facility] HEALTHKEEPERS PLUS / Product Type: Managed Care Medicaid /    In time: 1:37 p  Out time: 2:45 p  Total Treatment Time (min): 68  Total Timed Codes (min): 53  1:1 Treatment Time (MC only): 48   Visit #:  2    Treatment Area: Bilateral ankle pain [M25.571, M25.572]    SUBJECTIVE  Pain Level (0-10 scale): L-3, R-6  Any medication changes, allergies to medications, adverse drug reactions, diagnosis change, or new procedure performed?: [x] No    [] Yes (see summary sheet for update)  Subjective functional status/changes:   [] No changes reported  Patient reports that she has received further assessment and her L Laila's is completely torn, not a surgical option. She was advised to try PT for the L as well as the R. The R side hurts her more, the L only hurts in the ankle.       OBJECTIVE    Modality rationale: decrease edema, decrease inflammation, and decrease pain to improve the patients ability to ambulate, maneuver stairs, perform ADL's    Min Type Additional Details       [] Estim: []Att   []Unatt    []TENS instruct                  []IFC  []Premod   []NMES                     []Other:  []w/US   []w/ice   []w/heat  Position:  Location:       []  Traction: [] Cervical       []Lumbar                       [] Prone          []Supine                       []Intermittent   []Continuous Lbs:  [] before manual  [] after manual  []w/heat    []  Ultrasound: []Continuous   [] Pulsed                       at: []1MHz   []3MHz Location:  W/cm2:    [] Paraffin         Location:   []w/heat    []  Ice     []  Heat  []  Ice massage Position:  Location:    []  Laser  []  Other: Position:  Location:   15   [x]  Vasopneumatic Device: B feet  Pressure:       [x] lo [] med [] hi   Temperature: 34     [x] Skin assessment post-treatment:  [x]intact []redness- no adverse reaction    []redness - adverse reaction:     43 min Therapeutic Exercise:  [x] See flow sheet :   Rationale: increase ROM and increase strength to improve the patients ability to ambulate, maneuver stairs, perform ADL's     10 min Manual Therapy: STM R [place]'s and distal gastroc, L deltoid ligament     Rationale: decrease pain, increase ROM, and increase tissue extensibility to improve the patients ability to ambulate, maneuver stairs, perform ADL's               With   [] TE   [] TA   [] Neuro   [] SC   [] other: Patient Education: [x] Review HEP    [] Progressed/Changed HEP based on:   [] positioning   [] body mechanics   [] transfers   [] heat/ice application    [] other:      Other Objective/Functional Measures: R SLS 30 seconds, L holding on 30 seconds     Pain Level (0-10 scale) post treatment: \"better\"    ASSESSMENT/Changes in Function:   Patient able to PF L foot against green band resistance. Good performance of eccentric R sided PF on R. Patient instructed in ankle 4-way addition to HEP. Will do well with continued progression to [place]. Patient will continue to benefit from skilled PT services to modify and progress therapeutic interventions, address functional mobility deficits, address ROM deficits, address strength deficits, analyze and address soft tissue restrictions, analyze and cue movement patterns, analyze and modify body mechanics/ergonomics, address imbalance/dizziness, and instruct in home and community integration to attain remaining goals. []  See Plan of Care  []  See progress note/recertification  []  See Discharge Summary         Progress towards goals / Updated goals:   Independent with HEP at initial follow up visit     PLAN  [x]  Upgrade activities as tolerated     [x]  Continue plan of care  [x]  Update interventions per flow sheet       []  Discharge due to:_  []  Other:_      Vanda Mcallister DPT 4/25/2023

## 2023-05-01 ENCOUNTER — APPOINTMENT (OUTPATIENT)
Facility: HOSPITAL | Age: 63
End: 2023-05-01
Payer: MEDICAID

## 2023-05-03 ENCOUNTER — HOSPITAL ENCOUNTER (OUTPATIENT)
Dept: PHYSICAL THERAPY | Age: 63
Discharge: HOME | End: 2023-05-03
Payer: MEDICAID

## 2023-05-03 PROCEDURE — 97110 THERAPEUTIC EXERCISES: CPT

## 2023-05-03 PROCEDURE — 9990 CHARGE CONVERSION

## 2023-05-03 PROCEDURE — 97112 NEUROMUSCULAR REEDUCATION: CPT

## 2023-05-03 PROCEDURE — 97016 VASOPNEUMATIC DEVICE THERAPY: CPT

## 2023-05-08 ENCOUNTER — APPOINTMENT (OUTPATIENT)
Facility: HOSPITAL | Age: 63
End: 2023-05-08
Payer: MEDICAID

## 2023-05-11 ENCOUNTER — HOSPITAL ENCOUNTER (OUTPATIENT)
Facility: HOSPITAL | Age: 63
Discharge: HOME | End: 2023-05-11
Payer: MEDICAID

## 2023-05-11 DIAGNOSIS — S86.012A RUPTURE OF LEFT ACHILLES TENDON, INITIAL ENCOUNTER: ICD-10-CM

## 2023-05-11 PROCEDURE — 73721 MRI JNT OF LWR EXTRE W/O DYE: CPT

## 2023-05-15 ENCOUNTER — OFFICE VISIT (OUTPATIENT)
Age: 63
End: 2023-05-15
Payer: MEDICAID

## 2023-05-15 ENCOUNTER — NURSE ONLY (OUTPATIENT)
Age: 63
End: 2023-05-15

## 2023-05-15 VITALS
BODY MASS INDEX: 34.91 KG/M2 | WEIGHT: 197 LBS | OXYGEN SATURATION: 98 % | SYSTOLIC BLOOD PRESSURE: 132 MMHG | TEMPERATURE: 96.6 F | DIASTOLIC BLOOD PRESSURE: 72 MMHG | HEIGHT: 63 IN | RESPIRATION RATE: 18 BRPM | HEART RATE: 80 BPM

## 2023-05-15 DIAGNOSIS — E11.65 UNCONTROLLED TYPE 2 DIABETES MELLITUS WITH HYPERGLYCEMIA (HCC): ICD-10-CM

## 2023-05-15 DIAGNOSIS — M76.62 ACHILLES TENDINITIS, LEFT LEG: ICD-10-CM

## 2023-05-15 DIAGNOSIS — E11.65 UNCONTROLLED TYPE 2 DIABETES MELLITUS WITH HYPERGLYCEMIA (HCC): Primary | ICD-10-CM

## 2023-05-15 DIAGNOSIS — F41.9 ANXIETY DISORDER, UNSPECIFIED TYPE: ICD-10-CM

## 2023-05-15 DIAGNOSIS — E03.9 ACQUIRED HYPOTHYROIDISM: ICD-10-CM

## 2023-05-15 PROCEDURE — 1123F ACP DISCUSS/DSCN MKR DOCD: CPT | Performed by: FAMILY MEDICINE

## 2023-05-15 PROCEDURE — 99214 OFFICE O/P EST MOD 30 MIN: CPT | Performed by: FAMILY MEDICINE

## 2023-05-15 PROCEDURE — 3051F HG A1C>EQUAL 7.0%<8.0%: CPT | Performed by: FAMILY MEDICINE

## 2023-05-15 RX ORDER — DIAZEPAM 5 MG/1
TABLET ORAL
COMMUNITY
Start: 2023-05-09

## 2023-05-15 RX ORDER — ATORVASTATIN CALCIUM 20 MG/1
TABLET, FILM COATED ORAL
COMMUNITY
Start: 2023-03-29

## 2023-05-15 RX ORDER — BIOTIN 10000 MCG
1 CAPSULE ORAL 2 TIMES DAILY
COMMUNITY

## 2023-05-15 RX ORDER — ESCITALOPRAM OXALATE 20 MG/1
TABLET ORAL
COMMUNITY
Start: 2023-04-11 | End: 2023-05-15 | Stop reason: SDUPTHER

## 2023-05-15 RX ORDER — INSULIN GLARGINE 100 [IU]/ML
34 INJECTION, SOLUTION SUBCUTANEOUS NIGHTLY
Qty: 5 ADJUSTABLE DOSE PRE-FILLED PEN SYRINGE | Refills: 2 | Status: SHIPPED | OUTPATIENT
Start: 2023-05-15 | End: 2023-05-15 | Stop reason: SDUPTHER

## 2023-05-15 RX ORDER — LYSINE 500 MG
1 TABLET ORAL DAILY
COMMUNITY

## 2023-05-15 RX ORDER — INSULIN GLARGINE 100 [IU]/ML
34 INJECTION, SOLUTION SUBCUTANEOUS NIGHTLY
Qty: 5 ADJUSTABLE DOSE PRE-FILLED PEN SYRINGE | Refills: 2 | Status: SHIPPED | OUTPATIENT
Start: 2023-05-15

## 2023-05-15 RX ORDER — ERYTHROMYCIN 5 MG/G
OINTMENT OPHTHALMIC
COMMUNITY
Start: 2023-05-09

## 2023-05-15 RX ORDER — PEN NEEDLE, DIABETIC 31 GX5/16"
NEEDLE, DISPOSABLE MISCELLANEOUS
COMMUNITY
Start: 2023-04-25

## 2023-05-15 RX ORDER — LEVOTHYROXINE SODIUM 0.12 MG/1
125 TABLET ORAL
COMMUNITY
Start: 2022-11-03 | End: 2023-05-16 | Stop reason: DRUGHIGH

## 2023-05-15 RX ORDER — ESCITALOPRAM OXALATE 20 MG/1
20 TABLET ORAL DAILY
Qty: 30 TABLET | Refills: 5 | Status: SHIPPED | OUTPATIENT
Start: 2023-05-15

## 2023-05-15 RX ORDER — THIAMINE HCL 100 MG
2 TABLET ORAL 2 TIMES DAILY
COMMUNITY

## 2023-05-15 RX ORDER — HYDROCHLOROTHIAZIDE 25 MG/1
25 TABLET ORAL DAILY
COMMUNITY
Start: 2023-03-29

## 2023-05-15 RX ORDER — INSULIN GLARGINE 100 [IU]/ML
34 INJECTION, SOLUTION SUBCUTANEOUS NIGHTLY
COMMUNITY
Start: 2023-03-29 | End: 2023-05-15 | Stop reason: SDUPTHER

## 2023-05-15 RX ORDER — SODIUM PHOSPHATE,MONO-DIBASIC 19G-7G/118
1 ENEMA (ML) RECTAL 2 TIMES DAILY
COMMUNITY

## 2023-05-15 SDOH — ECONOMIC STABILITY: INCOME INSECURITY: HOW HARD IS IT FOR YOU TO PAY FOR THE VERY BASICS LIKE FOOD, HOUSING, MEDICAL CARE, AND HEATING?: NOT HARD AT ALL

## 2023-05-15 SDOH — ECONOMIC STABILITY: FOOD INSECURITY: WITHIN THE PAST 12 MONTHS, THE FOOD YOU BOUGHT JUST DIDN'T LAST AND YOU DIDN'T HAVE MONEY TO GET MORE.: NEVER TRUE

## 2023-05-15 SDOH — ECONOMIC STABILITY: FOOD INSECURITY: WITHIN THE PAST 12 MONTHS, YOU WORRIED THAT YOUR FOOD WOULD RUN OUT BEFORE YOU GOT MONEY TO BUY MORE.: NEVER TRUE

## 2023-05-15 SDOH — ECONOMIC STABILITY: HOUSING INSECURITY
IN THE LAST 12 MONTHS, WAS THERE A TIME WHEN YOU DID NOT HAVE A STEADY PLACE TO SLEEP OR SLEPT IN A SHELTER (INCLUDING NOW)?: NO

## 2023-05-15 ASSESSMENT — PATIENT HEALTH QUESTIONNAIRE - PHQ9
SUM OF ALL RESPONSES TO PHQ QUESTIONS 1-9: 0
10. IF YOU CHECKED OFF ANY PROBLEMS, HOW DIFFICULT HAVE THESE PROBLEMS MADE IT FOR YOU TO DO YOUR WORK, TAKE CARE OF THINGS AT HOME, OR GET ALONG WITH OTHER PEOPLE: 0
1. LITTLE INTEREST OR PLEASURE IN DOING THINGS: 0
6. FEELING BAD ABOUT YOURSELF - OR THAT YOU ARE A FAILURE OR HAVE LET YOURSELF OR YOUR FAMILY DOWN: 0
SUM OF ALL RESPONSES TO PHQ QUESTIONS 1-9: 0
SUM OF ALL RESPONSES TO PHQ QUESTIONS 1-9: 0
4. FEELING TIRED OR HAVING LITTLE ENERGY: 0
7. TROUBLE CONCENTRATING ON THINGS, SUCH AS READING THE NEWSPAPER OR WATCHING TELEVISION: 0
2. FEELING DOWN, DEPRESSED OR HOPELESS: 0
8. MOVING OR SPEAKING SO SLOWLY THAT OTHER PEOPLE COULD HAVE NOTICED. OR THE OPPOSITE, BEING SO FIGETY OR RESTLESS THAT YOU HAVE BEEN MOVING AROUND A LOT MORE THAN USUAL: 0
3. TROUBLE FALLING OR STAYING ASLEEP: 0
SUM OF ALL RESPONSES TO PHQ9 QUESTIONS 1 & 2: 0
SUM OF ALL RESPONSES TO PHQ QUESTIONS 1-9: 0
5. POOR APPETITE OR OVEREATING: 0
9. THOUGHTS THAT YOU WOULD BE BETTER OFF DEAD, OR OF HURTING YOURSELF: 0

## 2023-05-15 NOTE — PROGRESS NOTES
Identified pt with two pt identifiers(name and ). Chief Complaint   Patient presents with    Medication Adjustment     Would like to discuss insulin dosage changes        Health Maintenance Due   Topic    COVID-19 Vaccine (1)    Pneumococcal 65+ years Vaccine (1 - PCV)    HIV screen     DTaP/Tdap/Td vaccine (1 - Tdap)    Cervical cancer screen     Breast cancer screen     Shingles vaccine (1 of 2)    DEXA (modify frequency per FRAX score)        Wt Readings from Last 3 Encounters:   05/15/23 197 lb (89.4 kg)   23 200 lb (90.7 kg)   23 196 lb (88.9 kg)     Temp Readings from Last 3 Encounters:   05/15/23 (!) 96.6 °F (35.9 °C) (Temporal)     BP Readings from Last 3 Encounters:   05/15/23 132/72   23 (!) 150/78   23 (!) 146/70     Pulse Readings from Last 3 Encounters:   05/15/23 80   23 90   23 87           Depression Screening:  :     PHQ-9 Questionaire 5/15/2023 3/29/2023 2023 10/24/2022 2022   Little interest or pleasure in doing things 0 1 0 0 0   Feeling down, depressed, or hopeless 0 1 0 0 0   Trouble falling or staying asleep, or sleeping too much 0 - - - -   Feeling tired or having little energy 0 - - - -   Poor appetite or overeating 0 - - - -   Feeling bad about yourself - or that you are a failure or have let yourself or your family down 0 - - - -   Trouble concentrating on things, such as reading the newspaper or watching television 0 - - - -   Moving or speaking so slowly that other people could have noticed. Or the opposite - being so fidgety or restless that you have been moving around a lot more than usual 0 - - - -   Thoughts that you would be better off dead, or of hurting yourself in some way 0 - - - -   PHQ-9 Total Score 0 2 0 0 0   If you checked off any problems, how difficult have these problems made it for you to do your work, take care of things at home, or get along with other people?  0 - - - -        Fall Risk Assessment:  :     Fall Risk

## 2023-05-16 ENCOUNTER — TELEPHONE (OUTPATIENT)
Age: 63
End: 2023-05-16

## 2023-05-16 DIAGNOSIS — E03.9 HYPOTHYROIDISM, UNSPECIFIED TYPE: Primary | ICD-10-CM

## 2023-05-16 DIAGNOSIS — E11.65 UNCONTROLLED TYPE 2 DIABETES MELLITUS WITH HYPERGLYCEMIA (HCC): ICD-10-CM

## 2023-05-16 LAB
HBA1C MFR BLD: 9.5 % (ref 4.8–5.6)
T4 FREE SERPL-MCNC: 2.53 NG/DL (ref 0.82–1.77)
TSH SERPL DL<=0.005 MIU/L-ACNC: 0.06 UIU/ML (ref 0.45–4.5)

## 2023-05-16 RX ORDER — LEVOTHYROXINE SODIUM 0.1 MG/1
100 TABLET ORAL DAILY
Qty: 90 TABLET | Refills: 0 | Status: SHIPPED | OUTPATIENT
Start: 2023-05-16

## 2023-05-17 ENCOUNTER — TELEPHONE (OUTPATIENT)
Age: 63
End: 2023-05-17

## 2023-05-17 DIAGNOSIS — N95.1 MENOPAUSE SYNDROME: Primary | ICD-10-CM

## 2023-05-17 NOTE — TELEPHONE ENCOUNTER
----- Message from Nancy Joaquin MD sent at 5/16/2023  8:31 PM EDT -----  Call pt. Thyroid level is now too high. I need to lower dose of Levothyroxine. Plan to recheck again in 3 months. Also very high A1C (9,5%)  showing poor diabetes control. She needs to follow diabetic diet and take insulin daily.

## 2023-05-17 NOTE — TELEPHONE ENCOUNTER
Can you please get exact hormone name and dose that she wants sent to [facility]?   I don't have it on list.

## 2023-05-17 NOTE — TELEPHONE ENCOUNTER
Called and left pt a VM letting her know that her labs are on her Mychart or she can give us a call back for results.

## 2023-05-17 NOTE — TELEPHONE ENCOUNTER
Africa Causey put in order for hormone cream but it needs to be called in to [place]. Please phone it in. Thank you.

## 2023-05-17 NOTE — TELEPHONE ENCOUNTER
Patient wants a call back about her lab results and also needs Est/erot(tc) 0.5/50 MG/GM cream sent to [facility] [place]

## 2023-05-17 NOTE — TELEPHONE ENCOUNTER
[facility] and the medication is called:    Balanced Estrogen 0.5 mg  Progesterone 50mg /gm    Usually gets 30 grams sent over and uses 1mml at bedtime.

## 2023-05-24 ENCOUNTER — HOSPITAL ENCOUNTER (OUTPATIENT)
Facility: HOSPITAL | Age: 63
Setting detail: RECURRING SERIES
Discharge: HOME | End: 2023-05-27
Payer: MEDICAID

## 2023-05-24 PROCEDURE — 97116 GAIT TRAINING THERAPY: CPT

## 2023-05-24 PROCEDURE — 97112 NEUROMUSCULAR REEDUCATION: CPT

## 2023-05-24 PROCEDURE — 97110 THERAPEUTIC EXERCISES: CPT

## 2023-05-24 NOTE — PROCEDURES
Physical Therapy at [facility],   a part of  Letha Osorio  [address] [facility] Cedrick Eaton  Phone: [phone]  Fax: [phone]  PHYSICAL THERAPY PROGRESS NOTE  Patient Name:  Rush Colon :  1960   Treatment/Medical Diagnosis: Bilateral ankle pain [M25.571, M25.572]   Referral Source:  Lila Patrcik MD     Date of Initial Visit:  23 Attended Visits:  4 Missed Visits:  2     SUMMARY OF TREATMENT/ASSESSMENT:  At time of reassessment, patient has met all short term goals and demonstrates good progress toward long term goals. Imaging and assessment have confirmed complete L sided tear, updated goals to reflect addition of treatment to L side. She demonstrates improved DF range on the R as well as PF range and strength on the L. She remains limited in single limb stability on L. She can demonstrate a normalized gait cycle with toe off B with cueing. She will benefit from continued skilled services 1-2x/week for 6-8 weeks to achieve outstanding goals and maximized function. CURRENT STATUS      Short Term Goals: To be accomplished in 2 treatments:  Patient will be independent with initial HEP for R sided pain in order to transition to general wellness program. MET  Patient will demonstrate R SLS for 20 seconds or better to decrease fall risk when maneuvering on stairs. MET     Long Term Goals: To be accomplished in 10 treatments:  1. Patient will demonstrate R SLS for 30 seconds or better to decrease fall risk when maneuvering on stairs. MET  2. Patient will report worst pain on R side of 2/10 or better to increase QOL and tolerance for sleeping through the night. Progressing toward   2. Patient will demonstrate no tenderness to palpation of the Bobbi's tendon to normalize gait mechanics and ease community distance ambulation. Progressing toward     Updated Goals:  To be accomplished in 12 weeks:  Patient will demonstrate pain free SL

## 2023-05-24 NOTE — PROGRESS NOTES
Other Objective/Functional Measures  MMT:                                       R          L  Ankle DF                     5          5  Ankle PF                     5          5-  Ankle INV                    5          5  Ankle EVR                   5          5     AROM:                                                                          R          L                                                Ankle DF                     16        23  Ankle PF                     52        55            Balance:                                       R          L  SLS                             30        7      Tandem stance on pillow R back 30 seconds, on ground L foot back 30 seconds     Able to perform B heel raises with min discomfort on L that improved with repetition. Patient noting difficulty to control motion on L. Pain Level at end of session (0-10 scale): 1      Assessment   See progress note. Patient will continue to benefit from skilled PT / OT services to modify and progress therapeutic interventions, analyze and address functional mobility deficits, analyze and address ROM deficits, analyze and address strength deficits, analyze and address soft tissue restrictions, analyze and cue for proper movement patterns, analyze and address imbalance/dizziness, and instruct in home and community integration to address functional deficits and attain remaining goals. Progress toward goals / Updated goals:  []  See Progress Note/Recertification    Short Term Goals: To be accomplished in 2 treatments:  Patient will be independent with initial HEP for R sided pain in order to transition to general wellness program. MET  Patient will demonstrate R SLS for 20 seconds or better to decrease fall risk when maneuvering on stairs. MET     Long Term Goals:  To be accomplished in 10 treatments:  Patient will demonstrate R SLS for 30 seconds or better to decrease

## 2023-05-31 ENCOUNTER — HOSPITAL ENCOUNTER (OUTPATIENT)
Facility: HOSPITAL | Age: 63
Setting detail: RECURRING SERIES
Discharge: HOME | End: 2023-06-03
Payer: MEDICAID

## 2023-05-31 PROCEDURE — 97112 NEUROMUSCULAR REEDUCATION: CPT

## 2023-05-31 PROCEDURE — 97110 THERAPEUTIC EXERCISES: CPT

## 2023-05-31 PROCEDURE — 97140 MANUAL THERAPY 1/> REGIONS: CPT

## 2023-05-31 NOTE — PROGRESS NOTES
activities as tolerated  []  Discharge due to :  []  Other:      Jesenia Thompson, PT, DPT       5/31/2023       11:04 AM

## 2023-06-07 ENCOUNTER — HOSPITAL ENCOUNTER (OUTPATIENT)
Facility: HOSPITAL | Age: 63
Setting detail: RECURRING SERIES
Discharge: HOME | End: 2023-06-10
Payer: MEDICAID

## 2023-06-07 PROCEDURE — 97140 MANUAL THERAPY 1/> REGIONS: CPT

## 2023-06-07 PROCEDURE — 97110 THERAPEUTIC EXERCISES: CPT

## 2023-06-07 NOTE — PROGRESS NOTES
PHYSICAL THERAPY - MEDICARE DAILY TREATMENT NOTE (updated 3/23)      Date: 2023          Patient Name:  Les Gaucher :  1960   Medical   Diagnosis:  Bilateral ankle pain [M25.571, M25.572] Treatment Diagnosis:  M79.671  RIGHT FOOT PAIN and M79.672  LEFT FOOT PAIN    Referral Source:  Jackie Cutler MD Insurance:   Payor: Nicole Lobo / Plan: [employer] / Product Type: *No Product type* /                     Patient  verified yes     Visit #   Current  / Total 6 12   Time   In / Out 11:00 a 11:46 a   Total Treatment Time 46   Total Timed Codes 46   1:1 Treatment Time 55      [facility] Totals Reminder:  bill using total billable   min of TIMED therapeutic procedures and modalities. 8-22 min = 1 unit; 23-37 min = 2 units; 38-52 min = 3 units; 53-67 min = 4 units; 68-82 min = 5 units            SUBJECTIVE    Pain Level (0-10 scale): L 2/10, R 3-4/10    Any medication changes, allergies to medications, adverse drug reactions, diagnosis change, or new procedure performed?: [x] No    [] Yes (see summary sheet for update)  Medications: Verified on Patient Summary List    Subjective functional status/changes:     Patient reports that her R ankle is feeling better than last time, the adjusted HEP is better tolerated. Her increased pain did not return following last session. OBJECTIVE    Therapeutic Procedures: Tx Min Billable or 1:1 Min (if diff from Tx Min) Procedure, Rationale, Specifics   21 21 [id number] Therapeutic Exercise (timed):  increase ROM, strength, coordination, balance, and proprioception to improve patient's ability to progress to PLOF and address remaining functional goals.  (see flow sheet as applicable)     Details if applicable:     0 0 [id number] Neuromuscular Re-Education (timed):  improve balance, coordination, kinesthetic sense, posture, core stability and proprioception to improve patient's ability to develop conscious control of individual muscles and awareness

## 2023-06-21 ENCOUNTER — HOSPITAL ENCOUNTER (OUTPATIENT)
Facility: HOSPITAL | Age: 63
Setting detail: RECURRING SERIES
Discharge: HOME | End: 2023-06-24
Payer: MEDICAID

## 2023-06-21 PROCEDURE — 97112 NEUROMUSCULAR REEDUCATION: CPT

## 2023-06-21 PROCEDURE — 97110 THERAPEUTIC EXERCISES: CPT

## 2023-06-21 NOTE — PROGRESS NOTES
Physical Therapy at [facility],   a part of  [facility]  [address] [facility] Cedrick [facility]  Phone: [phone]  Fax: [phone]  PHYSICAL THERAPY PROGRESS NOTE  Patient Name:  Maci Ordoñez :  1960   Treatment/Medical Diagnosis: Bilateral ankle pain [M25.571, M25.572]   Referral Source:  Kristen Villanueva MD     Date of Initial Visit:  23 Attended Visits:  8 Missed Visits:  2     SUMMARY OF TREATMENT/ASSESSMENT:  At time of reassessment, patient has achieved all short term and 1/3 long term goals. She utilizes inserts in all shoes and was educated on the benefit of supportive shoes such as boots to reduce pain. She demonstrated improved B ankle AROM and strength but continues to lack stability and strength on the L, high pain levels noted on the R. She will be leaving for 6 weeks and is unable to pursue therapy due to insurance limitations. Today's visit utilized to review HEP and progressions to allow continued independent progress. CURRENT STATUS    Short Term Goals: To be accomplished in 2 treatments:  Patient will be independent with initial HEP for R sided pain in order to transition to general wellness program. MET  Patient will demonstrate R SLS for 20 seconds or better to decrease fall risk when maneuvering on stairs. MET     Long Term Goals: To be accomplished in 10 treatments:  Patient will demonstrate R SLS for 30 seconds or better to decrease fall risk when maneuvering on stairs. MET  Patient will report worst pain on R side of 2/10 or better to increase QOL and tolerance for sleeping through the night. Progressing toward   Patient will demonstrate no tenderness to palpation of the [place]'s tendon to normalize gait mechanics and ease community distance ambulation. Progressing toward     RECOMMENDATIONS  Discharge to independence due to upcoming trip for 6 weeks.   She will likely benefit from further skilled services when
Physical Therapy at [facility],   a part of  [place]  [address] [facility] Anshul Eaton  Phone: [phone]  Fax: [phone]  DISCHARGE SUMMARY  Patient Name: Gisela Richter : 1960   Treatment/Medical Diagnosis: Bilateral ankle pain [M25.571, M25.572]   Referral Source: Hemant Medina MD     Date of Initial Visit: 23 Attended Visits: 8 Missed Visits: 2     SUMMARY OF TREATMENT  At time of discharge, patient has achieved all short term and 1/3 long term goals. She utilizes inserts in all shoes and was educated on the benefit of supportive shoes such as boots to reduce pain. She demonstrates improved B ankle AROM and strength but continues to lack stability and strength on the L, high pain levels noted on the R. She will be leaving for 6 weeks and is unable to pursue continued therapy in [place]. Today's visit utilized to review HEP and progressions to allow continued independent progress. She will be discharged at this time. CURRENT STATUS    Short Term Goals: To be accomplished in 2 treatments:  Patient will be independent with initial HEP for R sided pain in order to transition to general wellness program. MET  Patient will demonstrate R SLS for 20 seconds or better to decrease fall risk when maneuvering on stairs. MET     Long Term Goals: To be accomplished in 10 treatments:  Patient will demonstrate R SLS for 30 seconds or better to decrease fall risk when maneuvering on stairs. MET  Patient will report worst pain on R side of 2/10 or better to increase QOL and tolerance for sleeping through the night. Progressing toward   Patient will demonstrate no tenderness to palpation of the [place]'s tendon to normalize gait mechanics and ease community distance ambulation. Progressing toward       RECOMMENDATIONS  Other: Discharge due to travel for next 6 weeks.   She will likely benefit from further skilled PT services when she returns
attain remaining goals. Progress toward goals / Updated goals:  []  See Progress Note/Re-certification    Short Term Goals: To be accomplished in 2 treatments:  Patient will be independent with initial HEP for R sided pain in order to transition to general wellness program. MET  Patient will demonstrate R SLS for 20 seconds or better to decrease fall risk when maneuvering on stairs. MET     Long Term Goals: To be accomplished in 10 treatments:  Patient will demonstrate R SLS for 30 seconds or better to decrease fall risk when maneuvering on stairs. MET  Patient will report worst pain on R side of 2/10 or better to increase QOL and tolerance for sleeping through the night. Progressing toward   Patient will demonstrate no tenderness to palpation of the Bobbi's tendon to normalize gait mechanics and ease community distance ambulation.  Progressing toward       PLAN  Yes  Continue plan of care  Re-Cert Due: NA  [x]  Upgrade activities as tolerated  []  Discharge due to :  []  Other:      Pierce Osullivan, PT, DPT       6/21/2023       11:10 AM

## 2023-06-22 ENCOUNTER — TELEPHONE (OUTPATIENT)
Age: 63
End: 2023-06-22

## 2023-06-23 ENCOUNTER — TELEPHONE (OUTPATIENT)
Age: 63
End: 2023-06-23

## 2023-06-23 DIAGNOSIS — E11.65 UNCONTROLLED TYPE 2 DIABETES MELLITUS WITH HYPERGLYCEMIA (HCC): Primary | ICD-10-CM

## 2023-06-28 ENCOUNTER — APPOINTMENT (OUTPATIENT)
Facility: HOSPITAL | Age: 63
End: 2023-06-28
Payer: MEDICAID

## 2023-07-14 DIAGNOSIS — E11.65 UNCONTROLLED TYPE 2 DIABETES MELLITUS WITH HYPERGLYCEMIA (HCC): ICD-10-CM

## 2023-07-14 RX ORDER — HUMAN INSULIN 100 [IU]/ML
INJECTION, SUSPENSION SUBCUTANEOUS
Qty: 10 ML | Refills: 1 | Status: SHIPPED | OUTPATIENT
Start: 2023-07-14

## 2023-08-09 ENCOUNTER — TELEPHONE (OUTPATIENT)
Age: 63
End: 2023-08-09

## 2023-11-15 RX ORDER — PEN NEEDLE, DIABETIC 31 GX5/16"
NEEDLE, DISPOSABLE MISCELLANEOUS
Qty: 100 EACH | Refills: 0 | Status: SHIPPED | OUTPATIENT
Start: 2023-11-15

## 2023-11-20 RX ORDER — HYDROCHLOROTHIAZIDE 25 MG/1
25 TABLET ORAL DAILY
Qty: 90 TABLET | Refills: 0 | Status: SHIPPED | OUTPATIENT
Start: 2023-11-20

## 2023-11-22 DIAGNOSIS — E11.65 UNCONTROLLED TYPE 2 DIABETES MELLITUS WITH HYPERGLYCEMIA (HCC): ICD-10-CM

## 2023-11-22 DIAGNOSIS — E03.9 HYPOTHYROIDISM, UNSPECIFIED TYPE: ICD-10-CM

## 2023-11-22 DIAGNOSIS — E03.9 HYPOTHYROIDISM, UNSPECIFIED TYPE: Primary | ICD-10-CM

## 2023-11-22 RX ORDER — INSULIN GLARGINE 100 [IU]/ML
34 INJECTION, SOLUTION SUBCUTANEOUS NIGHTLY
Qty: 5 ADJUSTABLE DOSE PRE-FILLED PEN SYRINGE | Refills: 0 | Status: SHIPPED | OUTPATIENT
Start: 2023-11-22

## 2023-11-22 NOTE — TELEPHONE ENCOUNTER
Pt called to schedule labs before appt on 12/1 I did not see the lab orders in there yet but she said they were going to be put in so it can be discussed at her appt. Lab is scheduled for 11/27 so could those labs be put in before that appt?

## 2023-11-22 NOTE — TELEPHONE ENCOUNTER
PT CALLED AND NEEDS A REFILL OF INSULIN GLARGINE.  PLEASE SEND TO [address] [place] [address] [address], [address] [phone] - f [phone]

## 2023-11-30 NOTE — PROGRESS NOTES
Mumtaz Hernandez (:  1960) is a 70 y.o. female,Established patient, here for evaluation of the following chief complaint(s):  Diabetes (No concerns)        ASSESSMENT/PLAN:  1. Uncontrolled type 2 diabetes mellitus with hyperglycemia (HCC)  -     Hemoglobin A1C; Future  -     Microalbumin / Creatinine Urine Ratio; Future  2. Hypothyroidism, unspecified type  -     TSH; Future  -     T4, Free; Future  3. Pure hypercholesterolemia, unspecified  -     atorvastatin (LIPITOR) 20 MG tablet; TAKE 1 TABLET BY MOUTH NIGHTLY FOR HIGH CHOLESTEROL, Disp-90 tablet, R-3Normal  4. Long term (current) use of insulin ([address])  5. Other long term (current) drug therapy  -     CBC with Auto Differential; Future  -     Comprehensive Metabolic Panel; Future  6. Encounter for screening for malignant neoplasm of colon  -     Occult Blood Stool Immunoassay; Future  7. Screening for HIV (human immunodeficiency virus)  -     HIV 1/2 Ag/Ab, 4TH Generation,W Rflx Confirm; Future  8. Breast screening declined  9. Anxiety disorder, unspecified type  -     escitalopram (LEXAPRO) 20 MG tablet; Take 1 tablet by mouth daily, Disp-90 tablet, R-5Normal  10. Edema, unspecified type  -     hydroCHLOROthiazide (HYDRODIURIL) 25 MG tablet; Take 1 tablet by mouth daily, Disp-90 tablet, R-1Normal      No follow-ups on file. SUBJECTIVE/OBJECTIVE:  HPI    Follow-up diabetes, hypertension, hyper cholesterolemia. Patient lost her medical insurance coverage. She is due for fasting labs and medication refills. Last eye exam done at [facility] in [facility].     Current Outpatient Medications   Medication Sig Dispense Refill    escitalopram (LEXAPRO) 20 MG tablet Take 1 tablet by mouth daily 90 tablet 5    hydroCHLOROthiazide (HYDRODIURIL) 25 MG tablet Take 1 tablet by mouth daily 90 tablet 1    atorvastatin (LIPITOR) 20 MG tablet TAKE 1 TABLET BY MOUTH NIGHTLY FOR HIGH CHOLESTEROL 90 tablet 3    LANTUS SOLOSTAR 100 UNIT/ML injection pen Inject 34

## 2023-12-01 ENCOUNTER — OFFICE VISIT (OUTPATIENT)
Age: 63
End: 2023-12-01

## 2023-12-01 VITALS
DIASTOLIC BLOOD PRESSURE: 80 MMHG | RESPIRATION RATE: 16 BRPM | SYSTOLIC BLOOD PRESSURE: 142 MMHG | OXYGEN SATURATION: 96 % | WEIGHT: 203 LBS | HEART RATE: 78 BPM | TEMPERATURE: 97.7 F | BODY MASS INDEX: 35.97 KG/M2 | HEIGHT: 63 IN

## 2023-12-01 DIAGNOSIS — Z11.4 SCREENING FOR HIV (HUMAN IMMUNODEFICIENCY VIRUS): ICD-10-CM

## 2023-12-01 DIAGNOSIS — Z79.899 OTHER LONG TERM (CURRENT) DRUG THERAPY: ICD-10-CM

## 2023-12-01 DIAGNOSIS — E78.00 PURE HYPERCHOLESTEROLEMIA, UNSPECIFIED: ICD-10-CM

## 2023-12-01 DIAGNOSIS — F41.9 ANXIETY DISORDER, UNSPECIFIED TYPE: ICD-10-CM

## 2023-12-01 DIAGNOSIS — R60.9 EDEMA, UNSPECIFIED TYPE: ICD-10-CM

## 2023-12-01 DIAGNOSIS — Z12.11 ENCOUNTER FOR SCREENING FOR MALIGNANT NEOPLASM OF COLON: ICD-10-CM

## 2023-12-01 DIAGNOSIS — E03.9 HYPOTHYROIDISM, UNSPECIFIED TYPE: ICD-10-CM

## 2023-12-01 DIAGNOSIS — Z28.21 IMMUNIZATION DECLINED: ICD-10-CM

## 2023-12-01 DIAGNOSIS — Z53.20 BREAST SCREENING DECLINED: ICD-10-CM

## 2023-12-01 DIAGNOSIS — Z79.4 LONG TERM (CURRENT) USE OF INSULIN (HCC): ICD-10-CM

## 2023-12-01 DIAGNOSIS — E11.65 UNCONTROLLED TYPE 2 DIABETES MELLITUS WITH HYPERGLYCEMIA (HCC): ICD-10-CM

## 2023-12-01 DIAGNOSIS — E11.65 UNCONTROLLED TYPE 2 DIABETES MELLITUS WITH HYPERGLYCEMIA (HCC): Primary | ICD-10-CM

## 2023-12-01 PROCEDURE — 99214 OFFICE O/P EST MOD 30 MIN: CPT | Performed by: FAMILY MEDICINE

## 2023-12-01 PROCEDURE — 1123F ACP DISCUSS/DSCN MKR DOCD: CPT | Performed by: FAMILY MEDICINE

## 2023-12-01 PROCEDURE — 3046F HEMOGLOBIN A1C LEVEL >9.0%: CPT | Performed by: FAMILY MEDICINE

## 2023-12-01 RX ORDER — ESCITALOPRAM OXALATE 20 MG/1
20 TABLET ORAL DAILY
Qty: 90 TABLET | Refills: 5 | Status: SHIPPED | OUTPATIENT
Start: 2023-12-01

## 2023-12-01 RX ORDER — HYDROCHLOROTHIAZIDE 25 MG/1
25 TABLET ORAL DAILY
Qty: 90 TABLET | Refills: 1 | Status: SHIPPED | OUTPATIENT
Start: 2023-12-01

## 2023-12-01 RX ORDER — ATORVASTATIN CALCIUM 20 MG/1
TABLET, FILM COATED ORAL
Qty: 90 TABLET | Refills: 3 | Status: SHIPPED | OUTPATIENT
Start: 2023-12-01

## 2023-12-02 PROBLEM — Z79.4 LONG TERM (CURRENT) USE OF INSULIN (HCC): Status: ACTIVE | Noted: 2023-12-02

## 2023-12-02 PROBLEM — F41.9 ANXIETY DISORDER: Status: ACTIVE | Noted: 2023-12-02

## 2023-12-02 PROBLEM — Z28.21 IMMUNIZATION DECLINED: Status: ACTIVE | Noted: 2023-12-02

## 2023-12-02 PROBLEM — Z12.11 ENCOUNTER FOR SCREENING FOR MALIGNANT NEOPLASM OF COLON: Status: ACTIVE | Noted: 2023-12-02

## 2023-12-04 ENCOUNTER — NURSE ONLY (OUTPATIENT)
Age: 63
End: 2023-12-04

## 2023-12-04 DIAGNOSIS — Z11.4 SCREENING FOR HIV (HUMAN IMMUNODEFICIENCY VIRUS): ICD-10-CM

## 2023-12-05 LAB — HIV 1+2 AB+HIV1 P24 AG SERPL QL IA: NON REACTIVE

## 2023-12-08 LAB
ALBUMIN SERPL-MCNC: 3.9 G/DL (ref 3.9–4.9)
ALBUMIN/GLOB SERPL: 1.8 {RATIO} (ref 1.2–2.2)
ALP SERPL-CCNC: 92 IU/L (ref 44–121)
ALT SERPL-CCNC: 15 IU/L (ref 0–32)
AST SERPL-CCNC: 14 IU/L (ref 0–40)
BASOPHILS # BLD AUTO: 0.1 X10E3/UL (ref 0–0.2)
BASOPHILS NFR BLD AUTO: 1 %
BILIRUB SERPL-MCNC: 0.4 MG/DL (ref 0–1.2)
BUN SERPL-MCNC: 13 MG/DL (ref 8–27)
BUN/CREAT SERPL: 15 (ref 12–28)
CALCIUM SERPL-MCNC: 9.5 MG/DL (ref 8.7–10.3)
CHLORIDE SERPL-SCNC: 102 MMOL/L (ref 96–106)
CO2 SERPL-SCNC: 26 MMOL/L (ref 20–29)
CREAT SERPL-MCNC: 0.86 MG/DL (ref 0.57–1)
EGFRCR SERPLBLD CKD-EPI 2021: 75 ML/MIN/1.73
EOSINOPHIL # BLD AUTO: 0.2 X10E3/UL (ref 0–0.4)
EOSINOPHIL NFR BLD AUTO: 3 %
ERYTHROCYTE [DISTWIDTH] IN BLOOD BY AUTOMATED COUNT: 13.1 % (ref 11.7–15.4)
GLOBULIN SER CALC-MCNC: 2.2 G/DL (ref 1.5–4.5)
GLUCOSE SERPL-MCNC: 189 MG/DL (ref 70–99)
HBA1C MFR BLD: 9 % (ref 4.8–5.6)
HCT VFR BLD AUTO: 42.5 % (ref 34–46.6)
HGB BLD-MCNC: 14.1 G/DL (ref 11.1–15.9)
IMM GRANULOCYTES # BLD AUTO: 0.1 X10E3/UL (ref 0–0.1)
IMM GRANULOCYTES NFR BLD AUTO: 1 %
LYMPHOCYTES # BLD AUTO: 1.2 X10E3/UL (ref 0.7–3.1)
LYMPHOCYTES NFR BLD AUTO: 22 %
MCH RBC QN AUTO: 28.7 PG (ref 26.6–33)
MCHC RBC AUTO-ENTMCNC: 33.2 G/DL (ref 31.5–35.7)
MCV RBC AUTO: 86 FL (ref 79–97)
MONOCYTES # BLD AUTO: 0.4 X10E3/UL (ref 0.1–0.9)
MONOCYTES NFR BLD AUTO: 8 %
NEUTROPHILS # BLD AUTO: 3.7 X10E3/UL (ref 1.4–7)
NEUTROPHILS NFR BLD AUTO: 65 %
PLATELET # BLD AUTO: 338 X10E3/UL (ref 150–450)
POTASSIUM SERPL-SCNC: 4.5 MMOL/L (ref 3.5–5.2)
PROT SERPL-MCNC: 6.1 G/DL (ref 6–8.5)
RBC # BLD AUTO: 4.92 X10E6/UL (ref 3.77–5.28)
SODIUM SERPL-SCNC: 141 MMOL/L (ref 134–144)
T4 FREE SERPL-MCNC: 1.31 NG/DL (ref 0.82–1.77)
TSH SERPL DL<=0.005 MIU/L-ACNC: 0.42 UIU/ML (ref 0.45–4.5)
WBC # BLD AUTO: 5.7 X10E3/UL (ref 3.4–10.8)

## 2023-12-09 DIAGNOSIS — E03.9 HYPOTHYROIDISM, UNSPECIFIED TYPE: ICD-10-CM

## 2023-12-09 RX ORDER — LEVOTHYROXINE SODIUM 0.1 MG/1
100 TABLET ORAL DAILY
Qty: 90 TABLET | Refills: 3 | Status: SHIPPED | OUTPATIENT
Start: 2023-12-09

## 2023-12-09 NOTE — RESULT ENCOUNTER NOTE
Lab results show A1c remaining fairly high at 9%.  This indicates poor diabetes control.  Make sure to follow diabetic diet and take insulin daily.  Plan to recheck diabetes in 3 months.  Normal blood cell counts.  Normal kidney and liver function.  Thyroid hormone level is back to normal.  Continue on current dose of thyroid medicine.

## 2023-12-10 LAB
ALBUMIN/CREAT UR: 4 MG/G CREAT (ref 0–29)
CREAT UR-MCNC: 113.9 MG/DL
MICROALBUMIN UR-MCNC: 4.5 UG/ML

## 2023-12-11 ENCOUNTER — TELEPHONE (OUTPATIENT)
Age: 63
End: 2023-12-11

## 2023-12-11 NOTE — TELEPHONE ENCOUNTER
----- Message from Selena Hardy MD sent at 12/9/2023  3:35 PM EST -----  Lab results show A1c remaining fairly high at 9%. This indicates poor diabetes control. Make sure to follow diabetic diet and take insulin daily. Plan to recheck diabetes in 3 months. Normal blood cell counts. Normal kidney and liver function. Thyroid hormone level is back to normal.  Continue on current dose of thyroid medicine.

## 2023-12-14 ENCOUNTER — TELEPHONE (OUTPATIENT)
Age: 63
End: 2023-12-14

## 2023-12-14 NOTE — TELEPHONE ENCOUNTER
LVM for patient to sign a medical release form so Dr Lindsay Lemus can get her last eye exam from [facility].

## 2023-12-15 ENCOUNTER — TELEPHONE (OUTPATIENT)
Age: 63
End: 2023-12-15

## 2023-12-15 NOTE — TELEPHONE ENCOUNTER
Patient would like a call back from [place]. I asked if it was something I could assist with, she stated it is not urgent but she has a few questions.  [phone]

## 2023-12-29 ENCOUNTER — TELEPHONE (OUTPATIENT)
Age: 63
End: 2023-12-29

## 2023-12-29 NOTE — TELEPHONE ENCOUNTER
Patient wants a call back from [place] only in regards to her labs.  She does not want to talk to anyone else    [phone]

## 2023-12-29 NOTE — TELEPHONE ENCOUNTER
I called patient and she stated that her blood work had ended up at [facility] and that she has a  bill for 180.00. I gave her the number to billing and apologized. She stated that she is not going to be able to do the occult stool kit because it will be sent to [facility]. I let her know that I would let provider know.

## 2024-01-01 PROBLEM — Z12.11 ENCOUNTER FOR SCREENING FOR MALIGNANT NEOPLASM OF COLON: Status: RESOLVED | Noted: 2023-12-02 | Resolved: 2024-01-01

## 2024-02-15 ENCOUNTER — NURSE ONLY (OUTPATIENT)
Age: 64
End: 2024-02-15

## 2024-02-15 ENCOUNTER — OFFICE VISIT (OUTPATIENT)
Age: 64
End: 2024-02-15

## 2024-02-15 VITALS
BODY MASS INDEX: 35.47 KG/M2 | DIASTOLIC BLOOD PRESSURE: 82 MMHG | RESPIRATION RATE: 16 BRPM | HEIGHT: 63 IN | TEMPERATURE: 98 F | OXYGEN SATURATION: 97 % | WEIGHT: 200.2 LBS | SYSTOLIC BLOOD PRESSURE: 168 MMHG | HEART RATE: 82 BPM

## 2024-02-15 DIAGNOSIS — E03.9 ACQUIRED HYPOTHYROIDISM: ICD-10-CM

## 2024-02-15 DIAGNOSIS — E11.65 UNCONTROLLED TYPE 2 DIABETES MELLITUS WITH HYPERGLYCEMIA (HCC): ICD-10-CM

## 2024-02-15 DIAGNOSIS — E16.2 HYPOGLYCEMIA: ICD-10-CM

## 2024-02-15 DIAGNOSIS — N95.0 POST-MENOPAUSAL BLEEDING: ICD-10-CM

## 2024-02-15 DIAGNOSIS — I10 PRIMARY HYPERTENSION: Primary | ICD-10-CM

## 2024-02-15 LAB
BILIRUBIN, URINE, POC: NEGATIVE
BLOOD URINE, POC: NEGATIVE
GLUCOSE URINE, POC: NORMAL
GLUCOSE, POC: 311 MG/DL
KETONES, URINE, POC: NEGATIVE
LEUKOCYTE ESTERASE, URINE, POC: NEGATIVE
NITRITE, URINE, POC: NEGATIVE
PH, URINE, POC: 5.5 (ref 4.6–8)
PROTEIN,URINE, POC: NEGATIVE
SPECIFIC GRAVITY, URINE, POC: 1.02 (ref 1–1.03)
URINALYSIS CLARITY, POC: CLEAR
URINALYSIS COLOR, POC: YELLOW
UROBILINOGEN, POC: NORMAL

## 2024-02-15 PROCEDURE — 3079F DIAST BP 80-89 MM HG: CPT | Performed by: FAMILY MEDICINE

## 2024-02-15 PROCEDURE — PBSHW AMB POC URINALYSIS DIP STICK AUTO W/O MICRO: Performed by: FAMILY MEDICINE

## 2024-02-15 PROCEDURE — 82947 ASSAY GLUCOSE BLOOD QUANT: CPT | Performed by: FAMILY MEDICINE

## 2024-02-15 PROCEDURE — PBSHW AMB POC GLUCOSE, QUANTITATIVE, BLOOD: Performed by: FAMILY MEDICINE

## 2024-02-15 PROCEDURE — 99214 OFFICE O/P EST MOD 30 MIN: CPT | Performed by: FAMILY MEDICINE

## 2024-02-15 PROCEDURE — 3077F SYST BP >= 140 MM HG: CPT | Performed by: FAMILY MEDICINE

## 2024-02-15 PROCEDURE — 3052F HG A1C>EQUAL 8.0%<EQUAL 9.0%: CPT | Performed by: FAMILY MEDICINE

## 2024-02-15 PROCEDURE — 81003 URINALYSIS AUTO W/O SCOPE: CPT | Performed by: FAMILY MEDICINE

## 2024-02-15 PROCEDURE — 1123F ACP DISCUSS/DSCN MKR DOCD: CPT | Performed by: FAMILY MEDICINE

## 2024-02-15 RX ORDER — PEN NEEDLE, DIABETIC 31 GX5/16"
NEEDLE, DISPOSABLE MISCELLANEOUS
Qty: 100 EACH | Refills: 4 | Status: SHIPPED | OUTPATIENT
Start: 2024-02-15

## 2024-02-15 RX ORDER — LOSARTAN POTASSIUM 50 MG/1
50 TABLET ORAL DAILY
Qty: 90 TABLET | Refills: 1 | Status: SHIPPED | OUTPATIENT
Start: 2024-02-15

## 2024-02-16 ENCOUNTER — TELEPHONE (OUTPATIENT)
Age: 64
End: 2024-02-16

## 2024-02-16 DIAGNOSIS — E11.65 UNCONTROLLED TYPE 2 DIABETES MELLITUS WITH HYPERGLYCEMIA (HCC): ICD-10-CM

## 2024-02-16 DIAGNOSIS — I10 PRIMARY HYPERTENSION: ICD-10-CM

## 2024-02-16 DIAGNOSIS — E16.2 HYPOGLYCEMIA: ICD-10-CM

## 2024-02-16 LAB
ALBUMIN SERPL-MCNC: 3.9 G/DL (ref 3.5–5)
ALBUMIN/GLOB SERPL: 1.3 (ref 1.1–2.2)
ALP SERPL-CCNC: 102 U/L (ref 45–117)
ALT SERPL-CCNC: 24 U/L (ref 12–78)
ANION GAP SERPL CALC-SCNC: 1 MMOL/L (ref 5–15)
AST SERPL-CCNC: 14 U/L (ref 15–37)
BILIRUB SERPL-MCNC: 0.4 MG/DL (ref 0.2–1)
BUN SERPL-MCNC: 13 MG/DL (ref 6–20)
BUN/CREAT SERPL: 12 (ref 12–20)
CALCIUM SERPL-MCNC: 9.9 MG/DL (ref 8.5–10.1)
CHLORIDE SERPL-SCNC: 102 MMOL/L (ref 97–108)
CO2 SERPL-SCNC: 33 MMOL/L (ref 21–32)
CREAT SERPL-MCNC: 1.06 MG/DL (ref 0.55–1.02)
CREAT UR-MCNC: 102 MG/DL
EST. AVERAGE GLUCOSE BLD GHB EST-MCNC: 203 MG/DL
GLOBULIN SER CALC-MCNC: 2.9 G/DL (ref 2–4)
GLUCOSE SERPL-MCNC: 262 MG/DL (ref 65–100)
HBA1C MFR BLD: 8.7 % (ref 4–5.6)
MICROALBUMIN UR-MCNC: <0.5 MG/DL
MICROALBUMIN/CREAT UR-RTO: <5 MG/G (ref 0–30)
POTASSIUM SERPL-SCNC: 4.5 MMOL/L (ref 3.5–5.1)
PROT SERPL-MCNC: 6.8 G/DL (ref 6.4–8.2)
SODIUM SERPL-SCNC: 136 MMOL/L (ref 136–145)
SPECIMEN HOLD: NORMAL

## 2024-02-16 RX ORDER — INSULIN GLARGINE 100 [IU]/ML
40 INJECTION, SOLUTION SUBCUTANEOUS NIGHTLY
Qty: 5 ADJUSTABLE DOSE PRE-FILLED PEN SYRINGE | Refills: 0
Start: 2024-02-16

## 2024-02-16 NOTE — TELEPHONE ENCOUNTER
Patient said the medication Januvia is $1,800.00 after bony picks up $200. She wants to know if there is anything else you can prescribe    [phone]

## 2024-02-16 NOTE — TELEPHONE ENCOUNTER
Pt had called complaining of lab charges from visit on December 7.  At that time she had no medical insurance and was under asif care with Eddie Huber.  Unfortunately labs were sent to [facility].  Patient received a bill for $189 for her HIV test and another bill of $467 for the remainder of her labs.  I explained to patient the recommendation for HIV screening based on USPSTF.    I will bring this concern for lab charges to our  if any assistance can be offered.    Patient was seen yesterday by Dr. Paz.  Patient cannot afford Januvia for her diabetes.  An A1c level was repeated that showed mild improvement at 8.7.  I recommended the patient adjust her dose of Lantus insulin to 40 units subcu daily.  Monitor her blood sugars.  Follow diabetic diet.  Wait 3 months to recheck an A1c level.  Also patient had mentioned post menopausal spotting.  She is on bioidentical hormone replacement therapy per [facility].  The pharmacist told her to stop taking her hormones x 1 week and consider reconstituting her formula for hormones.  I recommended patient follow-up with gynecologist to consider endometrial biopsy and pelvic ultrasound.  At this point she is waiting to see if she is eligible for Medicaid.  She cannot afford to pay out-of-pocket to see the specialist.

## 2024-02-17 LAB — TSH SERPL DL<=0.05 MIU/L-ACNC: 1.65 UIU/ML (ref 0.45–4.5)

## 2024-02-17 RX ORDER — GLIMEPIRIDE 2 MG/1
2 TABLET ORAL
Qty: 30 TABLET | Refills: 5 | Status: SHIPPED | OUTPATIENT
Start: 2024-02-17

## 2024-02-17 NOTE — RESULT ENCOUNTER NOTE
Patients A1c has slightly improved which is great news, I would advise the patient to increase her fluid intake to 64 ounces of water and add a fiber supplement. Additionally, I would like for the patient to start a medication called glimepiride to help with daytime blood sugar, to be taken twice a day. We tried to get her januvia, but the price was too high. Will re-order glimepiride.

## 2024-02-17 NOTE — PROGRESS NOTES
Identified pt with two pt identifiers(name and ).    Chief Complaint   Patient presents with    Headache     Patient has been headches since December, patient use to have migraines in the past and now they have returned     Vaginal Bleeding     Patient has noticed some breakthrough bleeding since december and would like to discuss         Health Maintenance Due   Topic    COVID-19 Vaccine (1)    Pneumococcal 65+ years Vaccine (1 - PCV)    DTaP/Tdap/Td vaccine (1 - Tdap)    Cervical cancer screen     Breast cancer screen     DEXA (modify frequency per FRAX score)     Diabetic retinal exam     Diabetic foot exam     Colorectal Cancer Screen     A1C test (Diabetic or Prediabetic)        Wt Readings from Last 3 Encounters:   23 92.1 kg (203 lb)   05/15/23 89.4 kg (197 lb)   23 90.7 kg (200 lb)     Temp Readings from Last 3 Encounters:   23 97.7 °F (36.5 °C) (Temporal)   05/15/23 (!) 96.6 °F (35.9 °C) (Temporal)     BP Readings from Last 3 Encounters:   23 (!) 142/80   05/15/23 132/72   23 (!) 150/78     Pulse Readings from Last 3 Encounters:   23 78   05/15/23 80   23 90           Depression Screening:  :         2/15/2024     2:32 PM 5/15/2023     9:45 AM 3/29/2023     3:00 PM 2023    10:00 AM 10/24/2022     8:00 AM 2022     8:00 AM   PHQ-9 Questionaire   Little interest or pleasure in doing things 0 0 1 0 0 0   Feeling down, depressed, or hopeless  0 1 0 0 0   Trouble falling or staying asleep, or sleeping too much  0       Feeling tired or having little energy  0       Poor appetite or overeating  0       Feeling bad about yourself - or that you are a failure or have let yourself or your family down  0       Trouble concentrating on things, such as reading the newspaper or watching television  0       Moving or speaking so slowly that other people could have noticed. Or the opposite - being so fidgety or restless that you have been moving around a lot more than 
The 10-year ASCVD risk score (Haile JOHNSON, et al., 2019) is: 15.7%    Values used to calculate the score:      Age: 63 years      Sex: Female      Is Non- : No      Diabetic: Yes      Tobacco smoker: No      Systolic Blood Pressure: 168 mmHg      Is BP treated: No      HDL Cholesterol: 64 mg/dL      Total Cholesterol: 188 mg/dL    
 Temp 98 °F (36.7 °C) (Temporal)   Resp 16   Ht 1.6 m (5' 3\")   Wt 90.8 kg (200 lb 3.2 oz)   SpO2 97%   BMI 35.46 kg/m²     Exam: chest clear, no hepatosplenomegaly    ASSESSMENT:  Diabetes Mellitus: poorly controlled, needs further observation, needs improvement, patient poorly compliant, and needs to follow diet more regularly    PLAN:  See orders for this visit as documented in the electronic medical record.  Issues reviewed with her: blood pressure goals were discussed as below 130 systolic and diastolic between 75-80 mmHg.  Will keep lantus as is  Will add januvia to regimen.     1. Primary hypertension    - losartan (COZAAR) 50 MG tablet; Take 1 tablet by mouth daily  Dispense: 90 tablet; Refill: 1  - Microalbumin / Creatinine Urine Ratio; Future    2. Uncontrolled type 2 diabetes mellitus with hyperglycemia (HCC)    - Insulin Syringes, Disposable, U-100 1 ML MISC; 1 each by Does not apply route daily  Dispense: 100 each; Refill: 3  - Comprehensive Metabolic Panel; Future  - AMB POC URINALYSIS DIP STICK AUTO W/O MICRO  - AMB POC GLUCOSE, QUANTITATIVE, BLOOD  - Microalbumin / Creatinine Urine Ratio; Future    3. Hypoglycemia    - Hemoglobin A1C; Future  - Comprehensive Metabolic Panel; Future  - AMB POC GLUCOSE, QUANTITATIVE, BLOOD  - Microalbumin / Creatinine Urine Ratio; Future    4. Acquired hypothyroidism    - Thyroid Cascade Profile; Future  - Comprehensive Metabolic Panel; Future    5. Post-menopausal bleeding    - BS - Celeste Roman MD Ob-Gyn, [place]  - US PELVIS COMPLETE NON-OB TRANSABDOMINAL AND TRANSVAGINAL; Future

## 2024-02-19 ENCOUNTER — TELEPHONE (OUTPATIENT)
Age: 64
End: 2024-02-19

## 2024-02-19 NOTE — TELEPHONE ENCOUNTER
----- Message from Katie Paz MD sent at 2/17/2024  4:57 PM EST -----  Patients A1c has slightly improved which is great news, I would advise the patient to increase her fluid intake to 64 ounces of water and add a fiber supplement. Additionally, I would like for the patient to start a medication called glimepiride to help with daytime blood sugar, to be taken twice a day. We tried to get her januvia, but the price was too high. Will re-order glimepiride.

## 2024-02-19 NOTE — TELEPHONE ENCOUNTER
Patient called and spoke with you the other day and would not elaborate any further but asked that you give her a call whenever you can. I advised you were in clinic but I would send the message back to you.

## 2024-02-21 NOTE — TELEPHONE ENCOUNTER
Pt had questions about new meds that Dr Paz ordered for her: Losartan and Glimepiride. I explained what each is for.  Monitor BP and sugars.  FU in 3 months for repeat A1C, BP.

## 2024-03-13 DIAGNOSIS — E11.65 UNCONTROLLED TYPE 2 DIABETES MELLITUS WITH HYPERGLYCEMIA (HCC): ICD-10-CM

## 2024-03-13 RX ORDER — INSULIN GLARGINE 100 [IU]/ML
40 INJECTION, SOLUTION SUBCUTANEOUS NIGHTLY
Qty: 5 ADJUSTABLE DOSE PRE-FILLED PEN SYRINGE | Refills: 0 | Status: SHIPPED | OUTPATIENT
Start: 2024-03-13

## 2024-03-21 ENCOUNTER — TELEPHONE (OUTPATIENT)
Age: 64
End: 2024-03-21

## 2024-04-03 DIAGNOSIS — N95.1 MENOPAUSE SYNDROME: ICD-10-CM

## 2024-04-30 NOTE — PROGRESS NOTES
Crepitus noted from mid face down torso and both arms.  Resp adequate no distress   Jade Roman is a 59 y.o. female, evaluated via audio-only technology on 5/14/2021 for Follow-up (Elevated blood pressure and blood glucose at preadmission testing)  . Assessment & Plan:   Diagnoses and all orders for this visit:    1. Type 2 diabetes mellitus without complication, without long-term current use of insulin ([facility] [address]): unsure what current A1c is, but recent . Recommendation made for insulin therapy to achieve glycemic control sooner. Will start Lantus at 16 units with plan to titrate as needed. Start checking BG fasting and 2-hours after a meal. Patient states that she will consider starting medication. -     insulin glargine (LANTUS,BASAGLAR) 100 unit/mL (3 mL) inpn; 16 Units by SubCUTAneous route nightly. -     Insulin Needles, Disposable, 31 gauge x 5/16\" ndle; Use daily with insulin pen. 2. Essential hypertension: had been on HCTZ for fluid retention previously. BP elevated at PAT, restart HCTZ 25 mg.   -     hydroCHLOROthiazide (HYDRODIURIL) 25 mg tablet; Take 1 Tab by mouth daily. Subjective:   Patient with recent preadmission testing 5/3/21 --  and /81. Surgery has been postponed until BG and BP improves. Patient self-discontinued all medications several months ago preferring natural treatments/therapies. Denies chest pain or discomfort, dyspnea. Only wishes to get BG and BP better. Prior to Admission medications    Medication Sig Start Date End Date Taking? Authorizing Provider   OTHER Take 1 Tab by mouth daily as needed. Probiotic  20 billion   Yes Provider, Historical   magnesium citrate 100 mg tab Take 2 Tabs by mouth daily. Yes Provider, Historical   OTHER Take 2 Tabs by mouth daily. Tumeric curcumin 1000mg   Yes Provider, Historical   OTHER Take 1 Cap by mouth daily. LETITIA 750mg   Yes Provider, Historical   chromium picolinate 1,000 mcg tablet Take 1 Tab by mouth daily.    Yes Provider, Historical   prasterone, dhea, (DHEA) 50 mg tab Take 1 Tab by mouth daily. Yes Provider, Historical   calcium citrate-vitamin D3 (Citracal + D) tablet Take 2 Tabs by mouth two (2) times a day. Yes Provider, Historical   psyllium husk, with sugar, 2 gram wafr Take 1 Package by mouth daily as needed. Yes Provider, Historical   melatonin 5 mg cap capsule Take 5 mg by mouth nightly. Yes Provider, Historical   OTHER Take 4 Tabs by mouth daily. glucogil   Yes Provider, Historical   elderberry fruit (ELDERBERRY PO) Take 2 Tabs by mouth daily. Yes Provider, Historical   coenzyme q10 (Co Q-10) 10 mg cap Take  by mouth. Yes Other, MD [facility]   IODINE Take 1 Tab by mouth daily. Tripe Iodine  Complex 12.5   Yes Other, MD [facility]   OTHER glucocil   Yes Other, MD [facility]   magnesium 250 mg tab Take  by mouth two (2) times a day. Yes Provider, Historical   VALERIAN ROOT by Does Not Apply route nightly. Yes Provider, Historical   prenatal vit/iron fum/folic ac (PRENATAL-FOLIC ACID PO) Take 1 Tab by mouth daily. Yes Provider, Historical   ferrous fumarate/vit Bcomp,C (SUPER B COMPLEX PO) Take 1 Tab by mouth daily. Yes Provider, Historical   biotin 10,000 mcg cap Take 1 Cap by mouth two (2) times a day. Yes Provider, Historical   potassium 99 mg tablet Take 2 Tabs by mouth daily. Yes Provider, Historical   lysine (L-LYSINE) 500 mg tab tablet Take 1 Tab by mouth daily. Yes Provider, Historical   cholecalciferol (VITAMIN D3) 1,000 unit cap Take 1,000 Units by mouth daily. Yes Provider, Historical   glucosamine-chondroitin (ARTHX) 500-400 mg cap Take 1 Cap by mouth two (2) times a day.    Yes Provider, Historical       No Known Allergies       Past Medical History:   Diagnosis Date    Cyst (solitary) of breast, left 05/2021    epidermal inclusion    Depression     Diabetes ([facility] [address]     Fluid retention     Hypothyroidism     Migraine headache      Social History     Tobacco Use    Smoking status: Never Smoker    Smokeless tobacco: Never Used   Substance Use Topics    Alcohol use: Yes     Comment: very seldom       ROS   Pertinent items noted in HPI    No flowsheet data found. Ameya Zurita, who was evaluated through a patient-initiated, synchronous (real-time) audio only encounter, and/or her healthcare decision maker, is aware that it is a billable service, with coverage as determined by her insurance carrier. She provided verbal consent to proceed: Yes. She has not had a related appointment within my department in the past 7 days or scheduled within the next 24 hours. I was in the office. The patient was at home.        Total Time: minutes: 11-20 minutes    Jamel Avila MD

## 2024-05-16 ENCOUNTER — TELEPHONE (OUTPATIENT)
Age: 64
End: 2024-05-16

## 2024-08-12 DIAGNOSIS — I10 PRIMARY HYPERTENSION: ICD-10-CM

## 2024-08-12 RX ORDER — LOSARTAN POTASSIUM 50 MG/1
50 TABLET ORAL DAILY
Qty: 90 TABLET | Refills: 0 | Status: SHIPPED | OUTPATIENT
Start: 2024-08-12

## 2024-08-19 RX ORDER — GLIMEPIRIDE 2 MG/1
TABLET ORAL
Qty: 30 TABLET | Refills: 0 | Status: SHIPPED | OUTPATIENT
Start: 2024-08-19

## 2024-09-19 RX ORDER — GLIMEPIRIDE 2 MG/1
TABLET ORAL
Qty: 90 TABLET | Refills: 3 | Status: SHIPPED | OUTPATIENT
Start: 2024-09-19

## 2024-11-24 DIAGNOSIS — E03.9 HYPOTHYROIDISM, UNSPECIFIED TYPE: ICD-10-CM

## 2024-11-25 RX ORDER — LEVOTHYROXINE SODIUM 100 UG/1
100 TABLET ORAL DAILY
Qty: 90 TABLET | Refills: 0 | Status: SHIPPED | OUTPATIENT
Start: 2024-11-25

## 2025-01-24 ENCOUNTER — COMMUNITY OUTREACH (OUTPATIENT)
Age: 65
End: 2025-01-24

## 2025-01-30 ENCOUNTER — COMMUNITY OUTREACH (OUTPATIENT)
Age: 65
End: 2025-01-30

## 2025-01-30 NOTE — PROGRESS NOTES
Patient's HM shows they are overdue for Mammogram.  Care Everywhere and  files searched.  No results to attach to order nor HM updated.    Patient declined.

## 2025-02-05 DIAGNOSIS — F41.9 ANXIETY DISORDER, UNSPECIFIED TYPE: ICD-10-CM

## 2025-02-05 RX ORDER — ESCITALOPRAM OXALATE 20 MG/1
20 TABLET ORAL DAILY
Qty: 90 TABLET | Refills: 0 | OUTPATIENT
Start: 2025-02-05

## 2025-02-06 NOTE — TELEPHONE ENCOUNTER
I think [place] out of [place]. LOV 2/15/24 with Dr Paz. I am unable to order refill. She should establish with local MD.

## 2025-03-03 DIAGNOSIS — E03.9 HYPOTHYROIDISM, UNSPECIFIED TYPE: ICD-10-CM

## 2025-03-03 RX ORDER — LEVOTHYROXINE SODIUM 100 UG/1
100 TABLET ORAL DAILY
Qty: 90 TABLET | Refills: 0 | OUTPATIENT
Start: 2025-03-03

## 2025-04-16 DIAGNOSIS — F41.9 ANXIETY DISORDER, UNSPECIFIED TYPE: ICD-10-CM

## 2025-04-16 RX ORDER — ESCITALOPRAM OXALATE 20 MG/1
20 TABLET ORAL DAILY
Qty: 90 TABLET | Refills: 0 | OUTPATIENT
Start: 2025-04-16

## 2025-05-12 RX ORDER — PEN NEEDLE, DIABETIC 31 GX5/16"
NEEDLE, DISPOSABLE MISCELLANEOUS
Qty: 100 EACH | Refills: 0 | OUTPATIENT
Start: 2025-05-12

## (undated) LAB
G LAMBLIA AG STL QL IA: NEGATIVE
O+P STL CONC: NORMAL
O+P STL CONC: NORMAL